# Patient Record
Sex: MALE | Race: OTHER | NOT HISPANIC OR LATINO | Employment: FULL TIME | URBAN - METROPOLITAN AREA
[De-identification: names, ages, dates, MRNs, and addresses within clinical notes are randomized per-mention and may not be internally consistent; named-entity substitution may affect disease eponyms.]

---

## 2017-01-06 ENCOUNTER — OFFICE VISIT (OUTPATIENT)
Dept: PHYSICAL MEDICINE AND REHAB | Facility: CLINIC | Age: 57
End: 2017-01-06
Payer: COMMERCIAL

## 2017-01-06 VITALS
HEART RATE: 59 BPM | DIASTOLIC BLOOD PRESSURE: 77 MMHG | BODY MASS INDEX: 23.19 KG/M2 | HEIGHT: 70 IN | WEIGHT: 162 LBS | SYSTOLIC BLOOD PRESSURE: 126 MMHG

## 2017-01-06 DIAGNOSIS — S63.512S: Primary | ICD-10-CM

## 2017-01-06 PROCEDURE — 1159F MED LIST DOCD IN RCRD: CPT | Mod: S$GLB,,, | Performed by: PHYSICAL MEDICINE & REHABILITATION

## 2017-01-06 PROCEDURE — 99999 PR PBB SHADOW E&M-EST. PATIENT-LVL II: CPT | Mod: PBBFAC,,, | Performed by: PHYSICAL MEDICINE & REHABILITATION

## 2017-01-06 PROCEDURE — 99203 OFFICE O/P NEW LOW 30 MIN: CPT | Mod: S$GLB,,, | Performed by: PHYSICAL MEDICINE & REHABILITATION

## 2017-01-06 PROCEDURE — 3078F DIAST BP <80 MM HG: CPT | Mod: S$GLB,,, | Performed by: PHYSICAL MEDICINE & REHABILITATION

## 2017-01-06 PROCEDURE — 3074F SYST BP LT 130 MM HG: CPT | Mod: S$GLB,,, | Performed by: PHYSICAL MEDICINE & REHABILITATION

## 2017-01-06 RX ORDER — ROSUVASTATIN CALCIUM 10 MG/1
TABLET, FILM COATED ORAL
Refills: 11 | COMMUNITY
Start: 2016-11-11 | End: 2018-01-11 | Stop reason: SDUPTHER

## 2017-01-06 RX ORDER — PRASUGREL 10 MG/1
10 TABLET, FILM COATED ORAL DAILY
COMMUNITY
End: 2017-11-03

## 2017-01-06 RX ORDER — FERROUS SULFATE, DRIED 160(50) MG
1 TABLET, EXTENDED RELEASE ORAL 2 TIMES DAILY WITH MEALS
COMMUNITY
End: 2017-11-03

## 2017-01-06 RX ORDER — ASPIRIN 81 MG/1
81 TABLET ORAL DAILY
COMMUNITY
Start: 2020-08-12 | End: 2023-07-21

## 2017-01-06 RX ORDER — MULTIVIT WITH MINERALS/HERBS
1 TABLET ORAL DAILY
COMMUNITY
End: 2019-04-26 | Stop reason: ALTCHOICE

## 2017-01-06 RX ORDER — UBIDECARENONE 30 MG
100 CAPSULE ORAL 2 TIMES DAILY
COMMUNITY
End: 2017-12-04 | Stop reason: CLARIF

## 2017-01-06 NOTE — LETTER
January 6, 2017      Nelson Wallace MD  [address] [facility]  [address]  [facility] [address]           [facility]  [address] [place] [id number]  Phone: [phone]  Fax: [phone]          Patient: Nelson Wallace   MR Number: [id number]   YOB: 1960   Date of Visit: 1/6/2017       Dear Dr. Nelson Wallace:    Thank you for referring Nelson Wallace to me for evaluation. Attached you will find relevant portions of my assessment and plan of care.    If you have questions, please do not hesitate to call me. I look forward to following Nelson Wallace along with you.    Sincerely,    Sharan Kaur MD    Enclosure  CC:  No Recipients    If you would like to receive this communication electronically, please contact [email or web address] or [phone] to request more information on [employer] access.    For providers and/or their staff who would like to refer a patient to [facility], please contact us through our one-stop-shop provider referral line, [facility], at [phone].    If you feel you have received this communication in error or would no longer like to receive these types of communications, please e-mail [email or web address]

## 2017-01-07 NOTE — PROGRESS NOTES
[facility]    Consulting Provider: Nelson Wallace MD    CHIEF COMPLAINT:   Chief Complaint   Patient presents with    Wrist Pain     left wrist pain     HISTORY OF PRESENT ILLNESS: Nelson Wallace is a 56 y.o. right handed male who presents to me for the first time for evaluation and treatment of left wrist pain.  He reports his pain is been present for the last 2-3 months.  There was no specific injury or traumatic event, however he questions if he may have aggravated the area lifting weights.  He also reports that he has prominent ulnar styloids bilaterally and is concerned this may be potential cause of pain.  He notes increased pain with forced wrist flexion and extension.  He feels the symptoms are improving over recent weeks with rest.  He has had a prior injection of corticosteroid to the dorsum of the left wrist last month with some mild relief of his symptoms.  He rates his pain as a 1/10 , but may rise to a 6/10 with certain movements.    Review of Systems   Constitutional: Negative for fever.   HENT: Negative for drooling.    Eyes: Negative for discharge.   Respiratory: Negative for choking.    Cardiovascular: Negative for chest pain.   Genitourinary: Negative for flank pain.   Skin: Negative for wound.   Allergic/Immunologic: Negative for immunocompromised state.   Neurological: Negative for tremors and syncope.   Psychiatric/Behavioral: Negative for behavioral problems.     Past Medical History:   Past Medical History   Diagnosis Date    Allergy     Elevated blood pressure     GERD (gastroesophageal reflux disease)        Past Surgical History: History reviewed. No pertinent past surgical history.    Family History:   Family History   Problem Relation Age of Onset    Osteoporosis Mother     Hypertension Mother     Hypertension Father     Hypertension Brother        Medications:   Current Outpatient Prescriptions on File Prior to Visit   Medication Sig Dispense Refill     "losartan (COZAAR) 25 MG tablet TAKE ONE TABLET BY MOUTH EVERY DAY 30 tablet 11    levothyroxine (SYNTHROID) 75 MCG tablet Take 1 tablet (75 mcg total) by mouth before breakfast. 30 tablet 11    loratadine (CLARITIN) 10 mg tablet Take 1 tablet (10 mg total) by mouth daily as needed for Allergies.  0    omeprazole (PRILOSEC) 20 MG capsule Take 1 capsule (20 mg total) by mouth daily as needed. 30 capsule 11     No current facility-administered medications on file prior to visit.        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    PHYSICAL EXAMINATION:   General    Vitals:    01/06/17 1415   BP: 126/77   Pulse: (!) 59   Weight: 73.5 kg (162 lb)   Height: 5' 10" (1.778 m)     Constitutional: Oriented to person, place, and time. No apparent distress. Appears well-developed and well-nourished. Pleasant.  HENT:   Head: Normocephalic and atraumatic.   Eyes: Right eye exhibits no discharge. Left eye exhibits no discharge. No scleral icterus.   Pulmonary/Chest: Effort normal. No respiratory distress.   Abdominal: There is no guarding.   Neurological: Alert and oriented to person, place, and time.   Psychiatric: Behavior is normal.   Right Hand Exam     Tenderness   The patient is experiencing no tenderness.         Range of Motion     Wrist   Extension: normal   Flexion: normal   Pronation: normal   Supination: normal     Muscle Strength   Wrist Extension: 5/5   Wrist Flexion: " 5/5   : 5/5     Other   Erythema: absent  Scars: absent  Sensation: normal  Pulse: present      Left Hand Exam     Tenderness   Left hand tenderness location: Mild to moderate tender to palpation over the dorsum of the radial carpal bones.     Range of Motion     Wrist   Extension: 45   Flexion: 80   Pronation: normal   Supination: normal     Muscle Strength   Wrist Extension: 5/5   Wrist Flexion: 5/5   :  5/5     Other   Erythema: absent  Scars: absent  Sensation: normal  Pulse: present        INSPECTION: There is no swelling, ecchymoses, erythema of the left wrist or hand.    Imaging  MRI of the left wrist from 12/8/2016: Patchy bone marrow edema in the carpus, with joint effusions and/or synovial thickening.     Diagnostic Ultrasound Exam: Brief limited diagnostic ultrasound of the dorsum of the left wrist was performed today.  The finger extensor tendons were normal in appearance.  There was no hypoechoic fluid collection seen around any tendon or joints.  There were no ganglion cysts observed.  Color Doppler function revealed no significant hyperemia of the dorsum of the carpal bones or radiocarpal joint.  Point of maximal tenderness was over the radial dorsal carpal bones.    Data Reviewed: MRI, ultrasound    Supportive Actions: Independent visualization of images or test specimens    ASSESSMENT:   1. Sprain of carpal joint of left wrist, sequela      PLAN:     1. Time was spent reviewing the above diagnosis in depth with Dr. Wallace today, including acute management and rehabilitation.     2.  We discussed that his pain is likely secondary to an overuse type phenomenon.  Brief diagnostic ultrasound exam failed to reveal any significant abnormalities over the dorsum of the wrist.  He appears to be most tender over the dorsum of the radial carpal bones.  MRI shows some generalized bone marrow edema, without obvious ligamentous injury or other significant pathology.  It is encouraging that his pain is  "reducing with rest.  I encouraged him to avoid lifting or repetitive exercises with the left wrist for the next 6 weeks.  He may continue to exercise his lower extremities.  We also discussed the use of the splint which is optional, but may help remind him to avoid activities with the left wrist.  He may obtain such a splint at his own leisure.    3.  There is evidence lacking to suggest rheumatologic etiologies, however we discussed the fact that it is reasonable to obtain basic rheumatologic labs if he desires.  He reports that he may ask a colleague to order these tests at [facility].     4. RTC in 6 weeks if his pain is not improved or worsened.    This is a consult from Dr. Nelson Wallace. Please see the "Communications" section of Epic to see how the consulting physician received the report of today's findings and recommendations. If it's an [facility] physician, it will be forwarded to his/her "in basket".    The above note was completed, in part, with the aid of Dragon dictation software/hardware. Translation errors may be present.  "

## 2017-04-06 ENCOUNTER — HISTORICAL (OUTPATIENT)
Dept: ADMINISTRATIVE | Facility: HOSPITAL | Age: 57
End: 2017-04-06

## 2017-04-06 LAB
ALBUMIN SERPL-MCNC: 4.2 G/DL (ref 3.1–4.7)
ALP SERPL-CCNC: 51 IU/L (ref 40–104)
ALT (SGPT): 24 IU/L (ref 3–33)
AST SERPL-CCNC: 20 IU/L (ref 10–40)
BASOPHILS NFR BLD: 0.1 K/UL (ref 0–0.2)
BASOPHILS NFR BLD: 0.7 %
BILIRUB SERPL-MCNC: 0.9 MG/DL (ref 0.3–1)
BUN SERPL-MCNC: 13 MG/DL (ref 8–20)
CALCIUM SERPL-MCNC: 8.7 MG/DL (ref 7.7–10.4)
CHLORIDE: 106 MMOL/L (ref 98–110)
CO2 SERPL-SCNC: 26.5 MMOL/L (ref 22.8–31.6)
COMPLEXED PSA SERPL-MCNC: 3.3 NG/ML (ref 0–3)
CREATININE: 0.91 MG/DL (ref 0.6–1.4)
EOSINOPHIL NFR BLD: 0.7 K/UL (ref 0–0.7)
EOSINOPHIL NFR BLD: 9.1 %
ERYTHROCYTE [DISTWIDTH] IN BLOOD BY AUTOMATED COUNT: 12.2 % (ref 11.7–14.9)
GLUCOSE: 107 MG/DL (ref 70–99)
GRAN #: 3.8 K/UL (ref 1.4–6.5)
GRAN%: 48.8 %
HCT VFR BLD AUTO: 39.7 % (ref 39–55)
HGB BLD-MCNC: 13.3 G/DL (ref 14–16)
IMMATURE GRANS (ABS): 0 K/UL (ref 0–1)
IMMATURE GRANULOCYTES: 0.1 %
LYMPH #: 2.7 K/UL (ref 1.2–3.4)
LYMPH%: 35.7 %
MCH RBC QN AUTO: 29 PG (ref 25–35)
MCHC RBC AUTO-ENTMCNC: 33.5 G/DL (ref 31–36)
MCV RBC AUTO: 86.5 FL (ref 80–100)
MONO #: 0.4 K/UL (ref 0.1–0.6)
MONO%: 5.6 %
NUCLEATED RBCS: 0 %
PLATELET # BLD AUTO: 162 K/UL (ref 140–440)
PMV BLD AUTO: 11 FL (ref 8.8–12.7)
POTASSIUM SERPL-SCNC: 4.4 MMOL/L (ref 3.5–5)
PROT SERPL-MCNC: 7 G/DL (ref 6–8.2)
RBC # BLD AUTO: 4.59 M/UL (ref 4.3–5.9)
SODIUM: 140 MMOL/L (ref 134–144)
T4 FREE SP9 P CHAL SERPL-SCNC: 0.89 NG/DL (ref 0.45–1.27)
TSH SERPL DL<=0.005 MIU/L-ACNC: 11.15 ULU/ML (ref 0.3–5.6)
WBC # BLD AUTO: 7.7 K/UL (ref 5–10)

## 2017-06-19 DIAGNOSIS — I10 ESSENTIAL HYPERTENSION: Primary | ICD-10-CM

## 2017-06-19 RX ORDER — LOSARTAN POTASSIUM 25 MG/1
25 TABLET ORAL DAILY
Qty: 90 TABLET | Refills: 1 | Status: SHIPPED | OUTPATIENT
Start: 2017-06-19 | End: 2017-09-06

## 2017-09-06 RX ORDER — LOSARTAN POTASSIUM 50 MG/1
50 TABLET ORAL DAILY
Qty: 90 TABLET | Refills: 1 | Status: SHIPPED | OUTPATIENT
Start: 2017-09-06 | End: 2017-09-21 | Stop reason: SDUPTHER

## 2017-09-06 RX ORDER — LOSARTAN POTASSIUM 50 MG/1
TABLET ORAL
COMMUNITY
End: 2017-09-06 | Stop reason: SDUPTHER

## 2017-09-21 RX ORDER — LOSARTAN POTASSIUM 50 MG/1
50 TABLET ORAL DAILY
Qty: 90 TABLET | Refills: 1 | Status: SHIPPED | OUTPATIENT
Start: 2017-09-21 | End: 2019-04-26 | Stop reason: SDUPTHER

## 2017-11-03 ENCOUNTER — OFFICE VISIT (OUTPATIENT)
Dept: UROLOGY | Facility: CLINIC | Age: 57
End: 2017-11-03
Payer: COMMERCIAL

## 2017-11-03 VITALS
HEIGHT: 70 IN | RESPIRATION RATE: 18 BRPM | WEIGHT: 165.44 LBS | HEART RATE: 53 BPM | SYSTOLIC BLOOD PRESSURE: 127 MMHG | DIASTOLIC BLOOD PRESSURE: 70 MMHG | BODY MASS INDEX: 23.68 KG/M2

## 2017-11-03 DIAGNOSIS — R39.15 URINARY URGENCY: ICD-10-CM

## 2017-11-03 DIAGNOSIS — R97.20 ELEVATED PSA: ICD-10-CM

## 2017-11-03 DIAGNOSIS — N40.1 BPH WITH URINARY OBSTRUCTION: Primary | ICD-10-CM

## 2017-11-03 DIAGNOSIS — N13.8 BPH WITH URINARY OBSTRUCTION: Primary | ICD-10-CM

## 2017-11-03 PROCEDURE — 99205 OFFICE O/P NEW HI 60 MIN: CPT | Mod: S$GLB,,, | Performed by: UROLOGY

## 2017-11-03 PROCEDURE — 99999 PR PBB SHADOW E&M-EST. PATIENT-LVL III: CPT | Mod: PBBFAC,,, | Performed by: UROLOGY

## 2017-11-03 RX ORDER — ACETAMINOPHEN 500 MG
2000 TABLET ORAL DAILY
COMMUNITY
End: 2021-12-16

## 2017-11-03 RX ORDER — LEVOTHYROXINE SODIUM 75 UG/1
75 TABLET ORAL
Qty: 30 TABLET | Refills: 11 | Status: SHIPPED | OUTPATIENT
Start: 2017-11-03 | End: 2017-11-03

## 2017-11-03 RX ORDER — TAMSULOSIN HYDROCHLORIDE 0.4 MG/1
0.4 CAPSULE ORAL DAILY
Qty: 90 CAPSULE | Refills: 3 | Status: SHIPPED | OUTPATIENT
Start: 2017-11-03 | End: 2018-11-03

## 2017-11-03 RX ORDER — ACETAMINOPHEN 160 MG/5ML
SUSPENSION, ORAL (FINAL DOSE FORM) ORAL
COMMUNITY
End: 2019-04-26 | Stop reason: ALTCHOICE

## 2017-11-03 RX ORDER — TAMSULOSIN HYDROCHLORIDE 0.4 MG/1
0.4 CAPSULE ORAL DAILY
Qty: 30 CAPSULE | Refills: 11 | Status: SHIPPED | OUTPATIENT
Start: 2017-11-03 | End: 2017-11-03 | Stop reason: SDUPTHER

## 2017-11-03 RX ORDER — LEVOTHYROXINE SODIUM 88 UG/1
TABLET ORAL
COMMUNITY
Start: 2017-04-07 | End: 2017-11-08 | Stop reason: SDUPTHER

## 2017-11-03 NOTE — PROGRESS NOTES
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    He feels like he empties his bladder, though does have to press on his perineum to get last few drops of urine out so he does not have post-void dribble/leak.  He does note terminal intermittency, which is worse in the morning. Nocturia x2. + AM hesitancy and intermittency of stream  Does also have some initial morning frequency, as has first am void 530-6, then 630 after morning shower (often gets urge in shower), and has about 3 AM voids.  He urinates 3x during his clinic day, 2 times at home, and 2 more before bed.    He does also have urinary urgency and can pinpoint 2 specific incidents lately of severe urgency.   Once when in security line at airport he chugged his bottle of water and orangina before going through and had urgency by the time he cleared security with Q15min moderate volume void frequency. Also had an office lunch in [facility] he drank a lot of coke and iced tea and then had Q15 minute frequency  On a typical day he has urgency though notes it is mild. No UUI  He drinks coffee in the morning and does often have tea during the day. ++ spicy foods  Average BM [id number]    Does also have mild painful ejaculation if his bladder is full.  Notes with the need to urinate after intercourse, he has increased urinary hesitancy and intermittency at this time.  Denies perineal, perirectal, testicular pain.  Occasional mild burning/stinging with urination that then calms down    psa 3.3 4/6/17, Cr 0.91  psa 2.8 in 2013  No family history of prostate cancer    Reports trace blood in his UA twice. On review UA had trc blood 4/2017 though UA Micro had 0-2 rbc, and UA 7/2016 negative.  Udip today negative except for 250 blood. He does note that he has some postprandial hyperglycemia, and he had just eaten lunch.  Used to be on effient and asa for cardiac stent 2 yrs  ago. No longer on effient    Bladder scan PVR is 62cc  AUA SS 10/2 (3 weak stream, 2 freq/intermittency)  Has not tried any  meds        Past Medical History:   Diagnosis Date    Allergy     Elevated blood pressure     GERD (gastroesophageal reflux disease)     Hyperlipemia     Hypertension    Hypothyroidism    Past Surgical History:   Procedure Laterality Date    CORONARY ANGIOPLASTY WITH STENT PLACEMENT         Family History   Problem Relation Age of Onset    Osteoporosis Mother     Hypertension Mother     Hypertension Father     Hypertension Brother        Social History     Social History    Marital status:      Spouse name: N/A    Number of children: N/A    Years of education: N/A     Occupational History     [employer]     Social History Main Topics    Smoking status: Never Smoker    Smokeless tobacco: Never Used    Alcohol use Yes      Comment: occ.     Drug use: No    Sexual activity: Yes     Other Topics Concern    Not on file     Social History Narrative    No narrative on file       Review of patient's allergies indicates:   Allergen Reactions    Adhesive tape-silicones      Rash on skin       Medications Reviewed: see MAR    ROS:    Constitutional: denies fevers, chills, night sweats, fatigue, malaise  Respiratory: negative for cough, shortness of breath, wheezing, dyspnea.  Cardiovascular: negative for chest pain, varicose veins, ankle swelling, palpitations, syncope.  GI: negative for abdominal pain, heartburn, indigestion, nausea, vomiting, constipation, diarrhea, blood in stool.   Urology: as noted above in HPI  Endocrinology: negative for cold intolerance, excessive thirst, not feeling tired/sluggish, no heat intolerance.   Hematology/Lymph: negative for easy bleeding, easy bruising, swollen glands.  Musculoskeletal: negative for back pain, joint pain, joint swelling, neck pain.  Allergy-Immunology: negative for seasonal allergies, negative for unusual  "infections.   Skin: negative for boils, breast lumps, hives, itching, rash.   Neurology: negative for, dizziness, headache, tingling/numbness, tremors.   Psych: satisfied with life; negative for, anxiety, depression, suicidal thoughts.     PHYSICAL EXAM:    Vitals:    11/03/17 1338   BP: 127/70   Pulse: (!) 53   Resp: 18     Body mass index is 23.74 kg/m². Weight: 75 kg (165 lb 7.3 oz) Height: 5' 10" (177.8 cm)       General: Alert, cooperative, no distress, appears stated age  Head: Normocephalic, without obvious abnormality, atraumatic  Neck: no masses, no thyromegaly, no lymphadenopathy  Eyes: PERRL, conjunctiva/corneas clear  Lungs: Respirations unlabored, normal effort, no accessory muscle use  CV: Warm and well perfused extremities  Abdomen: Soft, non-tender, no CVA tenderness, no hepatosplenomegaly, no hernia  Penis: phallus normal, uncircumcised, well cared for, no plaques or lesions.   Scrotum: no cysts, no lesions, no rash, no hydrocele.   Epididymes: normal, nontender, symmetrical, no masses or cysts.   Testes: normal, both descended, no masses.   Urethra: palpably normal with orthotopic meatus of normal size    FRAN: normal sphincter tone, no masses, no hemmorrhoids   PROSTATE: 35-40g, no nodules, non-tender, symmetrical.   Extremities: Extremities normal, atraumatic, no cyanosis or edema  Skin: Normal color, texture, and turgor, no rashes or lesions  Psych: Appropriate, well oriented, normal affect, normal mood  Neuro: Non-focal    Lab Results   Component Value Date    PSA 3.3 (H) 04/06/2017         Assessment/Diagnosis:    1. BPH with urinary obstruction  POCT URINE DIPSTICK WITHOUT MICROSCOPE   2. Urinary urgency     3. Elevated PSA  PSA, total and free       Plans:  He does have an enlarged prostate with moderate LUTS. We discussed some conservative measures such as discontinuing fluid intake 2 hours before bed and voiding before bed to control nocturia and limiting bladder irritants to control " frequency. With weak stream, intermittency, and PV dribble as well, discussed starting medical therapy with alpha blocker and Rxed flomax 0.4mg daily after discussion of possible side effects including dizziness and retrograde ejaculation. We did discuss that should alpha blockers not control his symptoms, future options include addition of 5alpha reductase inhibitors to decrease prostate volume, or interventions such as TURP and UroLift (which I provided information about today).    Also discussed conservative measures to control urgency and frequency including avoiding bladder irritants, timed voiding, not postponing voiding, and bowel regimen with OTC miralax, stool softeners, fiber supplements etc to produce soft daily bowel movement as distended bowel has extrinsic compressive effect on bladder. Discussed that bladder irritants include coffe (even decaf), tea, alcohol, soda, spicy foods, acidic juices (orange, tomato), vinegar, and artificial sweeteners. He has noticed increase in symptoms when consuming such food/beverage, and will make effort to decrease intake. We did discuss that sometimes prostatic enlargement/obstruction can contribute to urgency and the combination of medical management for BPH with conservative measures for urgency would likely control his sxs, though can discuss medical therapy for urgency in the future should it be necessary.    For a man in his mid 50's, we did discuss that his psa of 3.3 represents an age-specific (or at least borderline age-specific) PSA elevation. We did review the differential for elevated psa including benign enlargement (which he has based on NEHA), infection/inflammation such as prostatitis (which despite mild occasional ejaculatory discomfort he does not have sxs nor a neha consistsent with any acute prostatitis), and underlying malignancy. He does have a psa on file from 2013 which was 2.8, so newest value does not represent a dramatic rise, though discussed  deserves further evaluation. I have ordered free and total psa, noting that if psa rising, or free psa concerningly low, could discuss any diagnostics further vs resume routine screening.    60 minutes spent in direct face to face encounter, over half in counseling. All questions answered and patient was agreeable to treatment plan.  Flomax, avoid bladder irritants, free/total psa, RTC 3 mos.

## 2017-11-08 DIAGNOSIS — E03.9 HYPOTHYROIDISM, UNSPECIFIED TYPE: Primary | ICD-10-CM

## 2017-11-08 RX ORDER — LEVOTHYROXINE SODIUM 88 UG/1
88 TABLET ORAL
Qty: 90 TABLET | Refills: 1 | Status: SHIPPED | OUTPATIENT
Start: 2017-11-08 | End: 2018-04-06 | Stop reason: SDUPTHER

## 2017-12-04 ENCOUNTER — OFFICE VISIT (OUTPATIENT)
Dept: FAMILY MEDICINE | Facility: CLINIC | Age: 57
End: 2017-12-04
Payer: COMMERCIAL

## 2017-12-04 VITALS
OXYGEN SATURATION: 99 % | HEIGHT: 70 IN | DIASTOLIC BLOOD PRESSURE: 68 MMHG | HEART RATE: 65 BPM | WEIGHT: 165 LBS | BODY MASS INDEX: 23.62 KG/M2 | SYSTOLIC BLOOD PRESSURE: 120 MMHG

## 2017-12-04 DIAGNOSIS — I35.1 AORTIC EJECTION MURMUR: ICD-10-CM

## 2017-12-04 DIAGNOSIS — E78.01 FAMILIAL HYPERCHOLESTEROLEMIA: ICD-10-CM

## 2017-12-04 DIAGNOSIS — R97.20 ELEVATED PSA: ICD-10-CM

## 2017-12-04 DIAGNOSIS — I25.10 ATHEROSCLEROSIS OF NATIVE CORONARY ARTERY OF NATIVE HEART WITHOUT ANGINA PECTORIS: ICD-10-CM

## 2017-12-04 DIAGNOSIS — E03.9 ACQUIRED HYPOTHYROIDISM: ICD-10-CM

## 2017-12-04 DIAGNOSIS — I10 ESSENTIAL HYPERTENSION: ICD-10-CM

## 2017-12-04 DIAGNOSIS — E55.9 VITAMIN D DEFICIENCY: ICD-10-CM

## 2017-12-04 PROBLEM — E78.5 HYPERLIPEMIA: Status: ACTIVE | Noted: 2017-12-04

## 2017-12-04 PROCEDURE — 99213 OFFICE O/P EST LOW 20 MIN: CPT | Mod: ,,, | Performed by: FAMILY MEDICINE

## 2017-12-04 NOTE — PROGRESS NOTES
Subjective:       Patient ID: Nelson Wallace is a 57 y.o. male.    Chief Complaint: Hypertension    Hypertension   This is a chronic problem. The current episode started more than 1 year ago. The problem is unchanged. The problem is controlled. Pertinent negatives include no anxiety, chest pain, headaches, malaise/fatigue, peripheral edema or shortness of breath. There are no associated agents to hypertension. Risk factors for coronary artery disease include male gender and dyslipidemia. Past treatments include angiotensin blockers.     Review of Systems   Constitutional: Negative for activity change, appetite change, fever and malaise/fatigue.   HENT: Negative for congestion and sore throat.    Eyes: Negative for visual disturbance.   Respiratory: Negative for cough, chest tightness and shortness of breath.    Cardiovascular: Negative for chest pain.   Gastrointestinal: Negative for abdominal pain, constipation, diarrhea and nausea.   Genitourinary: Negative for difficulty urinating.   Musculoskeletal: Negative for back pain and myalgias.   Neurological: Negative for headaches.   Hematological: Negative for adenopathy.   Psychiatric/Behavioral: Negative for suicidal ideas.       Past Medical History:   Diagnosis Date    Allergy     Atherosclerosis of native coronary artery of native heart without angina pectoris     Elevated blood pressure     Elevated PSA     GERD (gastroesophageal reflux disease)     Hyperlipemia     Hypertension     Hypothyroidism       Past Surgical History:   Procedure Laterality Date    CORONARY ANGIOPLASTY WITH STENT PLACEMENT         Family History   Problem Relation Age of Onset    Osteoporosis Mother     Hypertension Mother     Hypertension Father     Hypertension Brother        Social History     Social History    Marital status:      Spouse name: N/A    Number of children: N/A    Years of education: N/A     Occupational History     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     Drug use: No    Sexual activity: Yes     Other Topics Concern    None     Social History Narrative    None       Current Outpatient Prescriptions   Medication Sig Dispense Refill    aspirin (ECOTRIN) 81 MG EC tablet Take 81 mg by mouth once daily.      b complex vitamins tablet Take 1 tablet by mouth once daily.      cholecalciferol, vitamin D3, (VITAMIN D3) 5,000 unit Tab Take 5,000 Units by mouth twice a week.       coenzyme Q10 200 mg capsule Take by mouth.      CRESTOR 10 mg tablet TK 1 T PO QHS  11    levothyroxine (LEVOXYL) 88 MCG tablet Take 1 tablet (88 mcg total) by mouth before breakfast. 90 tablet 1    losartan (COZAAR) 50 MG tablet Take 1 tablet (50 mg total) by mouth once daily. 90 tablet 1    tamsulosin (FLOMAX) 0.4 mg Cp24 Take 1 capsule (0.4 mg total) by mouth once daily. 90 capsule 3     No current facility-administered medications for this visit.        Review of patient's allergies indicates:   Allergen Reactions    Adhesive tape-silicones      Rash on skin     Objective:      Blood pressure 120/68, pulse 65, height 5' 10" (1.778 m), weight 74.8 kg (165 lb), SpO2 99 %. Body mass index is 23.68 kg/m².   Physical Exam   Constitutional: He is oriented to person, place, and time. He appears well-developed and well-nourished.   HENT:   Head: Atraumatic.   Eyes: EOM are normal. Pupils are equal, round, and reactive to light. Right pupil is round. Left pupil is round.   Neck: Normal range of motion. Neck supple. No thyromegaly present.   Cardiovascular: Normal rate and regular rhythm.    Murmur (2/6 DELONTE right, NR) heard.  Pulmonary/Chest: Effort normal and breath sounds normal. No respiratory distress.   Abdominal: There is no hepatosplenomegaly. There is no tenderness.   Musculoskeletal: Normal range of motion. He exhibits no edema.   Lymphadenopathy: "     He has no cervical adenopathy.        Right: No supraclavicular adenopathy present.        Left: No supraclavicular adenopathy present.   Neurological: He is alert and oriented to person, place, and time. He has normal strength. No cranial nerve deficit or sensory deficit.   Skin: Skin is warm and dry.   Psychiatric: He has a normal mood and affect. His behavior is normal. Judgment and thought content normal. He expresses no suicidal plans.           Assessment:       1. Essential hypertension    2. Atherosclerosis of native coronary artery of native heart without angina pectoris    3. Acquired hypothyroidism    4. Elevated PSA    5. Familial hypercholesterolemia    6. Aortic ejection murmur    7. Vitamin D deficiency        Plan:       Nelson was seen today for hypertension.    Diagnoses and all orders for this visit:    Essential hypertension  -     Comprehensive metabolic panel; Future  -     Lipid panel; Future  -     Comprehensive metabolic panel  -     Lipid panel    Atherosclerosis of native coronary artery of native heart without angina pectoris    Acquired hypothyroidism  -     TSH; Future  -     TSH    Elevated PSA  -     PSA, Screening; Future  -     PSA, Screening    Familial hypercholesterolemia    Aortic ejection murmur  -     2D Echo w/ Color Flow Doppler; Future    Vitamin D deficiency  -     Calcitriol (1,25 di-OH Vitamin D); Future  -     Calcitriol (1,25 di-OH Vitamin D)

## 2017-12-04 NOTE — PATIENT INSTRUCTIONS
Understanding Coronary Artery Disease (CAD)    To understand coronary artery disease (CAD), you need to know how your heart works. Your heart is a muscle that pumps blood throughout your body. To work right, your heart needs a steady supply of oxygen. It gets this oxygen from blood supplied by the coronary arteries.     Healthy artery   Healthy artery. When a coronary artery is healthy and has no blockages, blood flows through easily. Healthy arteries can easily supply the oxygen-rich blood your heart needs.     Damaged artery   Damaged artery. Coronary artery disease begins when damage to the artery lining leads to the buildup of fat-like substances and cholesterol along the artery wall. This is called plaque. This damage could be caused by things like high blood pressure or smoking. This plaque buildup begins to narrow the arteries carrying blood to the heart. This is called atherosclerosis,     Narrowed artery   Narrowed artery. As more plaque builds up, your artery has trouble supplying blood to your heart muscle when it needs it most, such as during exercise. You may not feel any symptoms when this happens. Or you may feel angina--pressure, tightness, achiness, or pain in your chest, jaw, neck, back, or arm.     Blocked artery   Blocked artery. A piece of plaque may break off and completely block the artery. Or a blood clot may plug the narrowed artery. When this happens, blood flow is blocked from reaching the heart. Without oxygen-rich blood, part of the heart muscle becomes damaged and stops working. You may feel crushing pressure or pain in or around your chest. This is a heart attack (acute myocardial infarction, or AMI) and is a medical emergency.     Date Last Reviewed: 3/28/2016  © 9708-9617 [employer]. [address] [place] [address], [place] [address]. All rights reserved. This information is not intended as a substitute for professional medical care. Always follow your healthcare  professional's instructions.

## 2017-12-09 LAB
ALBUMIN SERPL-MCNC: 4.6 G/DL (ref 3.1–4.7)
ALP SERPL-CCNC: 50 IU/L (ref 40–104)
ALT (SGPT): 29 IU/L (ref 3–33)
AST SERPL-CCNC: 24 IU/L (ref 10–40)
BILIRUB SERPL-MCNC: 1.1 MG/DL (ref 0.3–1)
BUN SERPL-MCNC: 20 MG/DL (ref 8–20)
CALCIUM SERPL-MCNC: 9.2 MG/DL (ref 7.7–10.4)
CHLORIDE: 106 MMOL/L (ref 98–110)
CO2 SERPL-SCNC: 28.5 MMOL/L (ref 22.8–31.6)
CREATININE: 0.86 MG/DL (ref 0.6–1.4)
GLUCOSE: 122 MG/DL (ref 70–99)
POTASSIUM SERPL-SCNC: 4.2 MMOL/L (ref 3.5–5)
PROT SERPL-MCNC: 7.1 G/DL (ref 6–8.2)
SODIUM: 140 MMOL/L (ref 134–144)
TSH SERPL DL<=0.005 MIU/L-ACNC: 1.77 ULU/ML (ref 0.3–5.6)

## 2018-01-11 DIAGNOSIS — E78.5 HYPERLIPIDEMIA, UNSPECIFIED HYPERLIPIDEMIA TYPE: Primary | ICD-10-CM

## 2018-01-12 RX ORDER — ROSUVASTATIN CALCIUM 10 MG/1
TABLET, FILM COATED ORAL
Qty: 90 TABLET | Refills: 3 | Status: SHIPPED | OUTPATIENT
Start: 2018-01-12 | End: 2018-11-10 | Stop reason: SDUPTHER

## 2018-02-02 ENCOUNTER — OFFICE VISIT (OUTPATIENT)
Dept: UROLOGY | Facility: CLINIC | Age: 58
End: 2018-02-02
Payer: COMMERCIAL

## 2018-02-02 VITALS
HEART RATE: 49 BPM | HEIGHT: 70 IN | SYSTOLIC BLOOD PRESSURE: 124 MMHG | WEIGHT: 164.88 LBS | BODY MASS INDEX: 23.6 KG/M2 | RESPIRATION RATE: 18 BRPM | DIASTOLIC BLOOD PRESSURE: 72 MMHG

## 2018-02-02 DIAGNOSIS — N40.1 BPH WITH URINARY OBSTRUCTION: Primary | ICD-10-CM

## 2018-02-02 DIAGNOSIS — N13.8 BPH WITH URINARY OBSTRUCTION: Primary | ICD-10-CM

## 2018-02-02 LAB
BILIRUB SERPL-MCNC: ABNORMAL MG/DL
BLOOD URINE, POC: ABNORMAL
COLOR, POC UA: CLEAR
GLUCOSE UR QL STRIP: 250
KETONES UR QL STRIP: ABNORMAL
LEUKOCYTE ESTERASE URINE, POC: ABNORMAL
NITRITE, POC UA: ABNORMAL
PH, POC UA: 5
POC RESIDUAL URINE VOLUME: 97 ML (ref 0–100)
PROTEIN, POC: ABNORMAL
SPECIFIC GRAVITY, POC UA: 1.02
UROBILINOGEN, POC UA: ABNORMAL

## 2018-02-02 PROCEDURE — 99999 PR PBB SHADOW E&M-EST. PATIENT-LVL III: CPT | Mod: PBBFAC,,, | Performed by: UROLOGY

## 2018-02-02 PROCEDURE — 99214 OFFICE O/P EST MOD 30 MIN: CPT | Mod: S$GLB,,, | Performed by: UROLOGY

## 2018-02-02 PROCEDURE — 81002 URINALYSIS NONAUTO W/O SCOPE: CPT | Mod: S$GLB,,, | Performed by: UROLOGY

## 2018-02-02 PROCEDURE — 3008F BODY MASS INDEX DOCD: CPT | Mod: S$GLB,,, | Performed by: UROLOGY

## 2018-02-02 PROCEDURE — 51798 US URINE CAPACITY MEASURE: CPT | Mod: S$GLB,,, | Performed by: UROLOGY

## 2018-02-02 NOTE — PROGRESS NOTES
[facility] Urology Progress Note    Nelson Wallace is a 57 y.o. male who presents for follow up of BPH/LUTS.     I saw him initially on 11/3/17.  He reported terminal intermittency, worse in the morning, and need to press on his perineum to get the last few drops of urine out so he does not have postvoid dribbling/leaking  He also noted early morning hesitancy and intermittency of stream, and nocturia ×2, as well as early morning frequency with about 3 morning voids before starting his work day.  Total DTF approximately 10x   He did also note urinary urgency and was able to pinpoint specific incidences of severe urgency, such as when traveling, or with a lunch meeting lately of severe urgency.  On a typical day he has urgency though notes it is mild. No UUI  He drinks coffee in the morning and does often have tea during the day. ++ spicy foods.  He notices worsening urgency and frequency with Tea and caffeine. Average BM [id number]  Also noted mild painful ejaculation if his bladder is full. As well, with the need to urinate after intercourse, has increased urinary hesitancy and intermittency at this time. Denies perineal, perirectal, testicular pain.  Occasional mild burning/stinging with urination that then calms down. Had never been on  meds  AUA SS: 10/2 (3: weak stream; 2: frequency, intermittency). PVR 62cc. FRAN 35-40g benign  Concerned for past MH but on review UA had trc blood 4/2017 though UA Micro had 0-2 rbc, and UA 7/2016 negative. ASA (but off effient) for cardiac stenting 2 years ago  psa 3.3 4/6/17, Cr 0.91 - psa 2.8 in 2013 - No family history of prostate cancer  Started flomax and discussed possible future urolift v turp, discussed conservative measures for OAB, and advised recheck of psa free/total given age specific elevation.    He returns today noting that his urgency has significantly improved.  He does still note that it is worse with tea drinking, though he has stopped all other bladder irritants  "beverages.  He has been compliant with Flomax and in general on a good day he feels that he is improved 70%, and on a bad day has improved 50% from last visit  He has been taking his Flomax in the morning as he finds helps him more throughout the day  He has been trying to void on a schedule and making a better effort to urinate during his clinic day  Occasional constipation, notes that he has a good bowel movement he will urinate better.  No ejaculatory complaints, no pain with ejaculation, not experiencing retrograde ejaculation  Nocturia is down to one time per night, and the flow is good at night whereas it used to be poor  AUA SS: 7/2 - medications helped 7 out of 10 - 2: Weak stream; 1: Emptying, frequency, intermittency, urgency, nocturia  PVR by bladder scan: 97cc  Udip: sg 1.025, pH 5, 250 glucose, otherwise negative  12/9/17: PSA 3.2 (24.7% free), Cr 0.86, eGFR 92      ROS: A comprehensive 10 system review was performed and is negative except as noted above in HPI    PHYSICAL EXAM:    Vitals:    02/02/18 1304   BP: 124/72   Pulse: (!) 49   Resp: 18     Body mass index is 23.66 kg/m². Weight: 74.8 kg (164 lb 14.5 oz) Height: 5' 10" (177.8 cm)       General: Alert, cooperative, no distress, appears stated age   Head: Normocephalic, without obvious abnormality, atraumatic   Eyes: PERRL, conjunctiva/corneas clear   Lungs: Respirations unlabored   Heart: Warm and well perfused   Abdomen: soft NT ND  Extremities: Extremities normal, atraumatic, no cyanosis or edema   Skin: Skin color, texture, turgor normal, no rashes or lesions   Psych: Appropriate   Neurologic: Non-focal       Recent Results (from the past 336 hour(s))   POCT URINE DIPSTICK WITHOUT MICROSCOPE    Collection Time: 02/02/18  1:15 PM   Result Value Ref Range    Color, UA clear     Spec Grav UA 1.020     pH, UA 5.0     WBC, UA neg     Nitrite, UA neg     Protein neg     Glucose,      Ketones, UA neg     Urobilinogen, UA neg     Bilirubin neg  "    Blood, UA neg    POCT Bladder Scan    Collection Time: 02/02/18  1:15 PM   Result Value Ref Range    POC Residual Urine Volume 97 0 - 100 mL       ASSESSMENT   1. BPH with urinary obstruction  POCT URINE DIPSTICK WITHOUT MICROSCOPE    POCT Bladder Scan    PSA, total and free       Plan    With Flomax and lifestyle modifications he has had moderate to significant improvement.  He does still have mild urgency and mild lower urinary tract symptoms.  At this time we both mutually feel that medical therapy for urgency or OAB is not indicated, and discussed fine-tuning conservative measures to decrease urgency and frequency such as avoiding bladder irritants, for him now namely tea, improved efforts at time voiding, not postponing urination, and a daily bowel regimen given the extrinsic compressive effect of distended bowel on the bladder.  Discussed OTC agents to help produce soft daily bowel movement.  We also discussed conservative measures to decrease his nocturia is down to one time per night now, though he shouldn't discontinue fluid intake 2 hours before bed and urinate just before bed.    With his 50-70% symptomatic improvement, he has not interested in any further evaluation at this time, though we did discuss that his postvoid residual remains elevated, and is more so today than at last visit at 97 cc.  We did review the dangers of incomplete bladder emptying secondary to prostatic obstruction such as urinary retention, bladder stones, UTI, and worsening LUTS and urgency.  We did briefly discuss dual medical therapy, adding a 5 alpha reductase inhibitor to his alpha blocker such as finasteride or dutasteride for gland shrinkage.  We did discuss that this is only beneficial and prostate greater than 40 g, which he may or may not have.  As well, for him, we discussed his borderline age elevated PSA and would defer starting finasteride which would affect his PSA until further evaluation.  His PSA of 3.2 remains  an age-specific borderline elevation for a 57-year-old man, though is stable from previous values and may represent acceptable PSA density with an estimated prostate volume of 35 cc.  His free PSA is not concerningly low, though his borderline.  We did discuss that a free PSA greater than 30% carries with it an approximate 8% risk of underlying malignancy, or is a free PSA less than 10% would be a 50% risk.  His free PSA percent of 24.7% is not overly concerning at this time, though given his PSA related to age, advised keeping a closer eye on this and checking it again in 6 months with a free and total PSA rather than annually.  Would defer any finasteride until repeat assessment.    We did discuss formal evaluation of his lower urinary tract with flexible cystourethroscope predetermined level of prostatic obstruction, with or without concurrent transrectal ultrasound to measure accurate prostate volume to guide further recommendations.  He has not interested at this time, though will consider.  We did discuss that these office-based diagnostic procedures are not only to evaluate for surgical or procedural intervention, but can also help guide medical therapy as well, as if for example he has a large median lobe gland shrinkage with oral medications is unlikely to help, and likewise if his prostate is smaller than 40 g.  Did discuss that this evaluation would also help outline any nonmedical treatment options such as Urolift versus TURP.  Certainly by symptomatology, including persistence of symptoms with alpha-blocker, he is a good candidate for Urolift and we discussed durable five-year results of the LIFT study in patients such as himself in the absence of obstructing median lobe.  He will continue to consider.    At this time given his mild symptom persistence as well as his incomplete emptying, which is of primary concern, we discussed a trial of doubling his Flomax to 0.8 mg daily.  We did discuss the  potential for increase in side effects such as dizziness and orthostatic hypotension, and recommended taking this double dose at bedtime rather than the morning.  He is found better efficacy of his Flomax use in the morning, and therefore advised he test taking a 0.8 mg dose on the day he has not busy in case of side effects.  If well tolerated can continue taking it in the morning.  At this time, she would like to remain on medical therapy, he can return to clinic in 6 months for repeat symptom assessment, and uroflow PVR.  I did however advise in the interim, given his incomplete emptying, that he should come in for a nursing visit for a bladder scan postvoid residual and a proximally 2 months to see if his emptying has improved, or has worsened.  If he continues to demonstrate any significant incomplete emptying, may discuss proceeding with lower tract workup as above.  As well, at any point in the interim, should he like to proceed with further diagnostics, we will schedule.    Summary:  - Continue conservative measures and lifestyle modifications to decrease urgency, frequency, nocturia  - Increase Flomax to 0.8 mg daily  - Nurse visit in 2 months for postvoid residual assessment by bladder scan  - RTC 6 months with free and total PSA prior, or any time sooner if wishes to proceed with lower tract evaluation (cysto/trus)

## 2018-02-02 NOTE — LETTER
February 4, 2018        CHONG Soto MD  [address]  [place] [address]             [place] [facility]  [address] [place] [address] [address]  [place] [address]  Phone: [phone]  Fax: [phone]   Patient: Nelson Wallace   MR Number: [id number]   YOB: 1960   Date of Visit: 2/2/2018       Dear Dr. Soto:    I had the pleasure of seeing your patient, Dr. Nelson Wallace today and urologic follow-up. Attached you will find relevant portions of my assessment and plan of care.    If you have questions, please do not hesitate to call me. I look forward to following Dr. Nelson Wallace along with you.  As always, please do not hesitate to contact me for the urologic needs of your patients    Sincerely,        Gilmer Daly MD            CC  No Recipients    Enclosure

## 2018-04-02 RX ORDER — LOSARTAN POTASSIUM 50 MG/1
TABLET ORAL
Qty: 90 TABLET | Refills: 0 | Status: SHIPPED | OUTPATIENT
Start: 2018-04-02 | End: 2018-07-24

## 2018-04-06 ENCOUNTER — CLINICAL SUPPORT (OUTPATIENT)
Dept: UROLOGY | Facility: CLINIC | Age: 58
End: 2018-04-06
Payer: COMMERCIAL

## 2018-04-06 DIAGNOSIS — E03.9 HYPOTHYROIDISM, UNSPECIFIED TYPE: ICD-10-CM

## 2018-04-06 DIAGNOSIS — N40.1 BPH WITH URINARY OBSTRUCTION: Primary | ICD-10-CM

## 2018-04-06 DIAGNOSIS — N13.8 BPH WITH URINARY OBSTRUCTION: Primary | ICD-10-CM

## 2018-04-06 LAB — POC RESIDUAL URINE VOLUME: 32 ML (ref 0–100)

## 2018-04-06 PROCEDURE — 51798 US URINE CAPACITY MEASURE: CPT | Mod: S$GLB,,, | Performed by: UROLOGY

## 2018-04-06 NOTE — PROGRESS NOTES
Pt arrived at clinic to have bladder scan done for PVR. Pt's PVR = 32. Pt does take flomax once daily. Pt stated that he was having trouble taking 2 was making him dizzy. Pt has f/u appt in August with Dr Daly.

## 2018-04-09 RX ORDER — LEVOTHYROXINE SODIUM 88 UG/1
88 TABLET ORAL
Qty: 90 TABLET | Refills: 0 | Status: SHIPPED | OUTPATIENT
Start: 2018-04-09 | End: 2019-04-26 | Stop reason: SDUPTHER

## 2018-07-24 ENCOUNTER — OFFICE VISIT (OUTPATIENT)
Dept: FAMILY MEDICINE | Facility: CLINIC | Age: 58
End: 2018-07-24
Payer: COMMERCIAL

## 2018-07-24 VITALS
WEIGHT: 166.81 LBS | SYSTOLIC BLOOD PRESSURE: 110 MMHG | OXYGEN SATURATION: 98 % | DIASTOLIC BLOOD PRESSURE: 68 MMHG | BODY MASS INDEX: 23.88 KG/M2 | HEART RATE: 54 BPM | HEIGHT: 70 IN

## 2018-07-24 DIAGNOSIS — Z11.59 NEED FOR HEPATITIS C SCREENING TEST: ICD-10-CM

## 2018-07-24 DIAGNOSIS — Z00.00 ENCOUNTER FOR PREVENTIVE HEALTH EXAMINATION: Primary | ICD-10-CM

## 2018-07-24 DIAGNOSIS — K21.9 GASTROESOPHAGEAL REFLUX DISEASE WITHOUT ESOPHAGITIS: ICD-10-CM

## 2018-07-24 PROCEDURE — 99396 PREV VISIT EST AGE 40-64: CPT | Mod: ,,, | Performed by: FAMILY MEDICINE

## 2018-07-24 NOTE — PATIENT INSTRUCTIONS
Treatment for Aortic Valve Regurgitation  Aortic valve regurgitation is when the aortic valve leaks. The aortic valve is on the left side of the heart and sits between the left lower chamber (ventricle) and the large blood vessel that sends blood to the body (aorta).  Types of treatment  Treatment depends on the cause of your condition and how severe it is.  Mild to moderate aortic valve regurgitation with no symptoms and normal heart function and size may be treated with:  · Regular monitoring. This includes regular checkups and testing.  · Risk factor management. Aggressive management of conditions that can cause aortic valve disease, such as high blood pressure, is recommended. Certain blood pressure medicines such as ACE inhibitors or calcium channel blockers may be prescribed.  Severe aortic valve regurgitation especially if symptoms are present, heart function is reduced, or the heart is enlarged is usually treated with surgery.  · Surgery. Most often the aortic valve will be replaced with either a mechanical or tissue valve depending on your age and other conditions. In rare cases, the valve may be repaired instead of replaced. Part of the aortic root may also be replaced with a graft if needed.  · Medicines. For some people who are not candidates for surgery, medicines such as ACE inhibitors or calcium channel blockers may be used to relieve some of the pressure on the heart. If heart failure is present, medicines such as diuretics are used to prevent fluid retention. For those who get a mechanical aortic valve, a blood thinner called warfarin will be prescribed to prevent the valve from developing blood clots. Those who have any type of valve replacement are advised to take antibiotics before certain procedures to reduce the risk of heart valve infection.  Acute severe aortic valve regurgitation is a medical emergency. Surgery is often done right away.  An infection of the heart valves can cause acute  mild valve regurgitation. You may only need antibiotic medicine for the infection.  Possible complications of aortic valve regurgitation  Possible complications of aortic valve regurgitation can include:  · Heart failure  · Bulging or weakening of the aorta (aortic aneurysm)  · Bacterial infection of the heart valves  · Problems from valve replacement surgery  · Sudden death  To reduce the risk of complications, you may be given medicines such as:  · Blood thinners to prevent blood clots  · Antibiotics before some medical and dental procedures to prevent infections  · Medicines to help the heart pump  Living with aortic valve regurgitation  See your healthcare provider for regular checkups. Call right away if your symptoms change.  Make sure to:  · Watch for symptoms when you exercise. Early symptoms may be noticed during exercise or activity.  · Talk with your healthcare provider about exercise and physical activity.  · Tell all of your healthcare providers including your dentist about your condition.  · Eat a low-salt, heart-healthy diet. This is to lower blood pressure and reduce the stress on your heart.  · Avoid caffeine and alcohol to reduce the risk of arrhythmias.  · Use a cholesterol-lowering medicine if prescribed.  · Stop smoking. Talk with your healthcare provider if you need help to stop.  Preventing aortic valve regurgitation  There are some things that you can do to help prevent aortic valve regurgitation, such as:  · Managing high blood pressure with lifestyle and medicine  · Having a sore throat checked for strep bacteria  · Taking a full course of antibiotic medicine for any strep infection exactly as your healthcare provider tells you to     When should I call the healthcare provider?  If you notice your symptoms gradually getting worse, call your healthcare provider. He or she may recommend a change in medicines or possibly surgery.  Have someone call 911 right away if you have:  · Paler than  normal skin  · Sudden shortness of breath  · A fast or abnormal heartbeat  · Fast breathing  · Severe shortness of breath  · Chest pain  · Severe dizziness  · Loss of consciousness   Date Last Reviewed: 6/1/2016  © 4351-2915 [employer]. [address] [place] [address], [place] [address]. All rights reserved. This information is not intended as a substitute for professional medical care. Always follow your healthcare professional's instructions.

## 2018-07-24 NOTE — PROGRESS NOTES
Subjective:       Patient ID: Nelson Wallace is a 58 y.o. male.    Chief Complaint: Annual Exam    No c/o, sees cardio soon,       Review of Systems   Constitutional: Negative for activity change, appetite change and fever.   HENT: Negative for congestion and sore throat.    Eyes: Negative for visual disturbance.   Respiratory: Negative for cough, chest tightness and shortness of breath.    Cardiovascular: Negative for chest pain.   Gastrointestinal: Negative for abdominal pain, constipation, diarrhea and nausea.   Genitourinary: Negative for difficulty urinating.   Musculoskeletal: Negative for back pain and myalgias.   Neurological: Negative for headaches.   Hematological: Negative for adenopathy.   Psychiatric/Behavioral: Negative for suicidal ideas.       Past Medical History:   Diagnosis Date    Allergy     Atherosclerosis of native coronary artery of native heart without angina pectoris     Elevated blood pressure     Elevated PSA     GERD (gastroesophageal reflux disease)     Hyperlipemia     Hypertension     Hypothyroidism       Past Surgical History:   Procedure Laterality Date    CORONARY ANGIOPLASTY WITH STENT PLACEMENT         Family History   Problem Relation Age of Onset    Osteoporosis Mother     Hypertension Mother     Hypertension Father     Hypertension Brother        Social History     Social History    Marital status:      Spouse name: N/A    Number of children: N/A    Years of education: N/A     Occupational History     [employer]     Social History Main Topics    Smoking status: Never Smoker    Smokeless tobacco: Never Used    Alcohol use No    Drug use: No    Sexual activity: Yes     Other Topics Concern    None     Social History Narrative    None       Current Outpatient Prescriptions   Medication Sig Dispense Refill    aspirin (ECOTRIN) 81 MG EC tablet Take 81 mg by mouth once daily.      b complex vitamins tablet Take 1 tablet by mouth once  "daily.      cholecalciferol, vitamin D3, (VITAMIN D3) 5,000 unit Tab Take 1,000 Units by mouth once daily.       coenzyme Q10 200 mg capsule Take by mouth.      CRESTOR 10 mg tablet TK 1 T PO QHS 90 tablet 3    levothyroxine (SYNTHROID) 88 MCG tablet TAKE 1 TABLET (88 MCG TOTAL) BY MOUTH BEFORE BREAKFAST. 90 tablet 0    losartan (COZAAR) 50 MG tablet Take 1 tablet (50 mg total) by mouth once daily. 90 tablet 1    tamsulosin (FLOMAX) 0.4 mg Cp24 Take 1 capsule (0.4 mg total) by mouth once daily. 90 capsule 3     No current facility-administered medications for this visit.        Review of patient's allergies indicates:  No Known Allergies  Objective:      Blood pressure 110/68, pulse (!) 54, height 5' 10" (1.778 m), weight 75.7 kg (166 lb 12.8 oz), SpO2 98 %. Body mass index is 23.93 kg/m².   Physical Exam   Constitutional: He is oriented to person, place, and time. He appears well-developed and well-nourished.   HENT:   Head: Atraumatic.   Eyes: EOM are normal. Pupils are equal, round, and reactive to light. Right pupil is round. Left pupil is round.   Neck: Normal range of motion. Neck supple. No thyromegaly present.   Cardiovascular: Normal rate and regular rhythm.    No murmur heard.  Pulmonary/Chest: Effort normal and breath sounds normal. No respiratory distress.   Abdominal: Soft. Bowel sounds are normal. There is no hepatosplenomegaly. There is no tenderness.   Musculoskeletal: Normal range of motion. He exhibits no edema.   Lymphadenopathy:     He has no cervical adenopathy.        Right: No supraclavicular adenopathy present.        Left: No supraclavicular adenopathy present.   Neurological: He is alert and oriented to person, place, and time. He has normal strength. No cranial nerve deficit or sensory deficit.   Skin: Skin is warm and dry.   Psychiatric: He has a normal mood and affect. His behavior is normal. Judgment and thought content normal. He expresses no suicidal plans.           Assessment: "       1. Encounter for preventive health examination    2. Need for hepatitis C screening test        Plan:       Nelson was seen today for annual exam.    Diagnoses and all orders for this visit:    Encounter for preventive health examination  -     Comprehensive metabolic panel; Future  -     Lipid panel; Future  -     CBC auto differential; Future  -     TSH; Future  -     Comprehensive metabolic panel  -     Lipid panel  -     CBC auto differential  -     TSH    Need for hepatitis C screening test  -     Hepatitis C antibody; Future  -     Hepatitis C antibody

## 2018-08-03 ENCOUNTER — OFFICE VISIT (OUTPATIENT)
Dept: UROLOGY | Facility: CLINIC | Age: 58
End: 2018-08-03
Payer: COMMERCIAL

## 2018-08-03 VITALS
SYSTOLIC BLOOD PRESSURE: 106 MMHG | HEART RATE: 63 BPM | WEIGHT: 166.88 LBS | BODY MASS INDEX: 23.89 KG/M2 | DIASTOLIC BLOOD PRESSURE: 61 MMHG | HEIGHT: 70 IN | RESPIRATION RATE: 18 BRPM

## 2018-08-03 DIAGNOSIS — R97.20 ELEVATED PSA: ICD-10-CM

## 2018-08-03 DIAGNOSIS — N13.8 BPH WITH URINARY OBSTRUCTION: Primary | ICD-10-CM

## 2018-08-03 DIAGNOSIS — N40.1 BPH WITH URINARY OBSTRUCTION: Primary | ICD-10-CM

## 2018-08-03 LAB
BILIRUB SERPL-MCNC: ABNORMAL MG/DL
BLOOD URINE, POC: ABNORMAL
COLOR, POC UA: CLEAR
GLUCOSE UR QL STRIP: 250
KETONES UR QL STRIP: ABNORMAL
LEUKOCYTE ESTERASE URINE, POC: ABNORMAL
NITRITE, POC UA: ABNORMAL
PH, POC UA: 5
POC RESIDUAL URINE VOLUME: 34 ML (ref 0–100)
PROTEIN, POC: ABNORMAL
SPECIFIC GRAVITY, POC UA: 1.02
UROBILINOGEN, POC UA: ABNORMAL

## 2018-08-03 PROCEDURE — 51798 US URINE CAPACITY MEASURE: CPT | Mod: S$GLB,,, | Performed by: UROLOGY

## 2018-08-03 PROCEDURE — 99999 PR PBB SHADOW E&M-EST. PATIENT-LVL III: CPT | Mod: PBBFAC,,, | Performed by: UROLOGY

## 2018-08-03 PROCEDURE — 81002 URINALYSIS NONAUTO W/O SCOPE: CPT | Mod: S$GLB,,, | Performed by: UROLOGY

## 2018-08-03 PROCEDURE — 99213 OFFICE O/P EST LOW 20 MIN: CPT | Mod: 25,S$GLB,, | Performed by: UROLOGY

## 2018-08-03 NOTE — PROGRESS NOTES
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     11/3/17:  Initial eval  C/o terminal intermittency, worse in the morning, and need to press on his perineum to get the last few drops of urine out so he does not have postvoid dribbling/leaking  He also noted early morning hesitancy and intermittency of stream, and nocturia ×2, as well as early morning frequency with about 3 morning voids before starting his work day.  DTF 10x  He did also note urinary urgency and was able to pinpoint specific incidences of severe urgency, such as when traveling, or with a lunch meeting lately of severe urgency.  On a typical day he has urgency though notes it is mild. No UUI. He drinks coffee in the morning and does often have tea during the day. ++ spicy foods.  He notices worsening urgency and frequency with Tea and caffeine. Average BM [id number]. Also noted mild painful ejaculation if his bladder is full. As well, with the need to urinate after intercourse, has increased urinary hesitancy and intermittency at this time. Denies perineal, perirectal, testicular pain. Occasional mild burning/stinging with urination that then calms down. Had never been on  meds  AUA SS: 10/2 (3: weak stream; 2: frequency, intermittency). PVR 62cc. FRAN 35-40g benign  Concerned for past MH but on review UA had trc blood 4/2017 though UA Micro had 0-2 rbc, and UA 7/2016 negative. ASA (but off effient) for cardiac stenting 2 years ago  psa 3.3 4/6/17, Cr 0.91 - psa 2.8 in 2013 - No family history of prostate cancer  Started flomax and discussed possible future urolift v turp, discussed conservative measures for OAB, and advised recheck of psa free/total given age specific elevation.     2/2/18:  urgency has significantly improved with eliminating most bladder irritants, and notes is worse with tea. Made effort at timed voiding  On Flomax feels that he is improved  "70% on a good day, and on a bad day has improved 50% from last visit. Take in AM as helps through day, though NTF down to 1x and flow improved at night.  Occasional constipation, notes that he has a good bowel movement he will urinate better.  AUA SS: 7/2 - medications helped 7 out of 10 - 2: Weak stream; 1: Emptying, frequency, intermittency, urgency, nocturia  PVR by bladder scan: 97cc. Udip: sg 1.025, pH 5, 250 glucose, otherwise negative  12/9/17: PSA 3.2 (24.7% free), Cr 0.86, eGFR 92  Advised doubling flomax, though returned to 0.4mg dose 2/2 dizziness and reeval of PVR on 4/6/18 was 32cc    Returns today noting:  Ok with one flomax.  Still has exacerbation of LUTS with tea and orange drink   Losartan at night, and has nocturia 2x/night  Since switching losartan back to am gets up 1x/night - max 2  Voids before bed  No fluids before bed  Not much double voiding  Some urine trapping/pv dribble.  Denies hematuria/dysuria  Did not repeat psa.  Udip with trc protein, 250 glucose  34cc pvr by bladder scan      ROS: A comprehensive 10 system review was performed and is negative except as noted above in HPI    PHYSICAL EXAM:    Vitals:    08/03/18 1352   BP: 106/61   Pulse: 63   Resp: 18     Body mass index is 23.95 kg/m². Weight: 75.7 kg (166 lb 14.2 oz) Height: 5' 10" (177.8 cm)       General: Alert, cooperative, no distress, appears stated age   Head: Normocephalic, without obvious abnormality, atraumatic   Eyes: PERRL, conjunctiva/corneas clear   Lungs: Respirations unlabored   Heart: Warm and well perfused   Abdomen: soft NT ND  : normal phallus, uncirc, normal orhotopic meatus, bilaterally desc normal testes without mass  FRAN: 40g+ smooth wide flat no nodules  Extremities: Extremities normal, atraumatic, no cyanosis or edema   Skin: Skin color, texture, turgor normal, no rashes or lesions   Psych: Appropriate   Neurologic: Non-focal       Recent Results (from the past 336 hour(s))   POCT URINE DIPSTICK " WITHOUT MICROSCOPE    Collection Time: 08/03/18  2:01 PM   Result Value Ref Range    Color, UA clear     Spec Grav UA 1.025     pH, UA 5.0     WBC, UA neg     Nitrite, UA neg     Protein trace     Glucose,      Ketones, UA neg     Urobilinogen, UA neg     Bilirubin neg     Blood, UA neg    POCT Bladder Scan    Collection Time: 08/03/18  2:01 PM   Result Value Ref Range    POC Residual Urine Volume 34 0 - 100 mL       ASSESSMENT   1. BPH with urinary obstruction  POCT URINE DIPSTICK WITHOUT MICROSCOPE    POCT Bladder Scan   2. Elevated PSA         Plan    He does still have mild bothersome lower urinary tract symptoms despite alpha-blocker, Flomax.  He was intolerant of double dose, though has continue 0.4 mg dose daily without consequence.  He is comfortable with his urinary habits at this time despite mild bothersome persistent symptoms.  We did again review further treatment options for his BPH/LUTS such as dual medical therapy with Flomax and a 5 alpha reductase inhibitor.  We also discussed formal lower urinary tract evaluation for obstruction with cystoscopy plus or minus concurrent transrectal ultrasound-guided accurate volumetric measurement of prostate to help guide further recommendations, again discussing potential interventions such as Urolift and TURP as dictated by prostate anatomy in size.  Still not interested at this time and any further workup for procedures.  Will consider.   Before adding 5 alpha reductase inhibitor, given history of age specific elevated PSA, would like at least 1 more re-evaluation with free and total PSA to ensure stability.  We did again discuss that he has had an age specific elevated PSA though has been stable with reasonable free PSA.  Should PSA have risen or free PSA be concerning the low, would advocate prostate biopsy at which case cystoscopy can be performed concurrently.  If PSA is stable, can continue to screen annually.  He does have a large prostate,  estimated 40 g, and therefore his PSA may be related to his enlarged prostate as his estimated PSA density is low/normal.  He would like to get his lab work done at [facility] and therefore handwritten prescription has been provided for free and total PSA.  I will chart check the results.    When calling with results can revisit adding finasteride.  Otherwise, RTC annually with PSA prior or p.r.n. in the interim.  If would like to proceed with lower tract evaluation with cystoscopy/trus, can call back at any time

## 2018-08-23 RX ORDER — LOSARTAN POTASSIUM 50 MG/1
TABLET ORAL
Qty: 90 TABLET | Refills: 0 | Status: SHIPPED | OUTPATIENT
Start: 2018-08-23 | End: 2019-04-26 | Stop reason: SDUPTHER

## 2018-08-24 LAB
ALBUMIN SERPL-MCNC: 4.6 G/DL (ref 3.1–4.7)
ALP SERPL-CCNC: 49 IU/L (ref 40–104)
ALT (SGPT): 28 IU/L (ref 3–33)
AST SERPL-CCNC: 26 IU/L (ref 10–40)
BASOPHILS NFR BLD: 0 K/UL (ref 0–0.2)
BASOPHILS NFR BLD: 0.3 %
BILIRUB SERPL-MCNC: 1.2 MG/DL (ref 0.3–1)
BUN SERPL-MCNC: 16 MG/DL (ref 8–20)
CALCIUM SERPL-MCNC: 8.9 MG/DL (ref 7.7–10.4)
CHLORIDE: 104 MMOL/L (ref 98–110)
CO2 SERPL-SCNC: 27.9 MMOL/L (ref 22.8–31.6)
CREATININE: 0.93 MG/DL (ref 0.6–1.4)
EOSINOPHIL NFR BLD: 0.3 K/UL (ref 0–0.7)
EOSINOPHIL NFR BLD: 5.1 %
ERYTHROCYTE [DISTWIDTH] IN BLOOD BY AUTOMATED COUNT: 11.9 % (ref 11.7–14.9)
GLUCOSE: 103 MG/DL (ref 70–99)
GRAN #: 2.9 K/UL (ref 1.4–6.5)
GRAN%: 47.3 %
HCT VFR BLD AUTO: 39.3 % (ref 39–55)
HGB BLD-MCNC: 13.6 G/DL (ref 14–16)
IMMATURE GRANS (ABS): 0 K/UL (ref 0–1)
IMMATURE GRANULOCYTES: 0.2 %
LYMPH #: 2.5 K/UL (ref 1.2–3.4)
LYMPH%: 41.2 %
MCH RBC QN AUTO: 29.7 PG (ref 25–35)
MCHC RBC AUTO-ENTMCNC: 34.6 G/DL (ref 31–36)
MCV RBC AUTO: 85.8 FL (ref 80–100)
MONO #: 0.4 K/UL (ref 0.1–0.6)
MONO%: 5.9 %
NUCLEATED RBCS: 0 %
PLATELET # BLD AUTO: 152 K/UL (ref 140–440)
PMV BLD AUTO: 11.4 FL (ref 8.8–12.7)
POTASSIUM SERPL-SCNC: 4.1 MMOL/L (ref 3.5–5)
PROT SERPL-MCNC: 7.3 G/DL (ref 6–8.2)
RBC # BLD AUTO: 4.58 M/UL (ref 4.3–5.9)
SODIUM: 139 MMOL/L (ref 134–144)
TSH SERPL DL<=0.005 MIU/L-ACNC: 2.88 ULU/ML (ref 0.3–5.6)
WBC # BLD AUTO: 6.1 K/UL (ref 5–10)

## 2018-08-25 LAB — HCV AB SERPL QL IA: <0.1 S/CO RATIO (ref 0–0.9)

## 2018-08-26 ENCOUNTER — PATIENT MESSAGE (OUTPATIENT)
Dept: UROLOGY | Facility: CLINIC | Age: 58
End: 2018-08-26

## 2018-11-10 DIAGNOSIS — E78.5 HYPERLIPIDEMIA, UNSPECIFIED HYPERLIPIDEMIA TYPE: ICD-10-CM

## 2018-11-12 RX ORDER — ROSUVASTATIN CALCIUM 10 MG/1
TABLET, FILM COATED ORAL
Qty: 90 TABLET | Refills: 2 | Status: SHIPPED | OUTPATIENT
Start: 2018-11-12 | End: 2019-06-14 | Stop reason: SDUPTHER

## 2018-11-26 DIAGNOSIS — G47.25 JET LAG: Primary | ICD-10-CM

## 2018-11-26 DIAGNOSIS — E03.9 HYPOTHYROIDISM, UNSPECIFIED TYPE: ICD-10-CM

## 2018-11-26 RX ORDER — LEVOTHYROXINE SODIUM 88 UG/1
88 TABLET ORAL
Qty: 90 TABLET | Refills: 0 | Status: SHIPPED | OUTPATIENT
Start: 2018-11-26 | End: 2019-04-26 | Stop reason: SDUPTHER

## 2018-11-26 RX ORDER — ESZOPICLONE 3 MG/1
3 TABLET, FILM COATED ORAL NIGHTLY
Qty: 10 TABLET | Refills: 0 | Status: SHIPPED | OUTPATIENT
Start: 2018-11-26 | End: 2018-12-26

## 2018-12-26 RX ORDER — TAMSULOSIN HYDROCHLORIDE 0.4 MG/1
0.4 CAPSULE ORAL DAILY
Qty: 90 CAPSULE | Refills: 2 | Status: SHIPPED | OUTPATIENT
Start: 2018-12-26 | End: 2020-01-15 | Stop reason: SDUPTHER

## 2019-01-25 RX ORDER — LOSARTAN POTASSIUM 50 MG/1
TABLET ORAL
Qty: 90 TABLET | Refills: 0 | Status: SHIPPED | OUTPATIENT
Start: 2019-01-25 | End: 2019-06-14 | Stop reason: SDUPTHER

## 2019-03-17 DIAGNOSIS — R97.20 ELEVATED PSA: Primary | ICD-10-CM

## 2019-04-19 DIAGNOSIS — E03.9 HYPOTHYROIDISM, UNSPECIFIED TYPE: ICD-10-CM

## 2019-04-22 RX ORDER — LEVOTHYROXINE SODIUM 88 UG/1
TABLET ORAL
Qty: 90 TABLET | Refills: 0 | Status: SHIPPED | OUTPATIENT
Start: 2019-04-22 | End: 2019-06-14 | Stop reason: SDUPTHER

## 2019-04-24 ENCOUNTER — TELEPHONE (OUTPATIENT)
Dept: FAMILY MEDICINE | Facility: CLINIC | Age: 59
End: 2019-04-24

## 2019-04-24 DIAGNOSIS — E03.9 HYPOTHYROIDISM, UNSPECIFIED TYPE: Primary | ICD-10-CM

## 2019-04-24 DIAGNOSIS — R73.9 HYPERGLYCEMIA: ICD-10-CM

## 2019-04-24 DIAGNOSIS — E78.5 HYPERLIPIDEMIA, UNSPECIFIED HYPERLIPIDEMIA TYPE: ICD-10-CM

## 2019-04-24 DIAGNOSIS — I10 HYPERTENSION, UNSPECIFIED TYPE: ICD-10-CM

## 2019-04-26 ENCOUNTER — OFFICE VISIT (OUTPATIENT)
Dept: UROLOGY | Facility: CLINIC | Age: 59
End: 2019-04-26
Payer: COMMERCIAL

## 2019-04-26 VITALS
HEART RATE: 55 BPM | SYSTOLIC BLOOD PRESSURE: 119 MMHG | DIASTOLIC BLOOD PRESSURE: 70 MMHG | HEIGHT: 70 IN | WEIGHT: 168.44 LBS | BODY MASS INDEX: 24.11 KG/M2

## 2019-04-26 DIAGNOSIS — N40.1 BPH WITH OBSTRUCTION/LOWER URINARY TRACT SYMPTOMS: Primary | ICD-10-CM

## 2019-04-26 DIAGNOSIS — R97.20 ELEVATED PSA: ICD-10-CM

## 2019-04-26 DIAGNOSIS — N13.8 BPH WITH OBSTRUCTION/LOWER URINARY TRACT SYMPTOMS: Primary | ICD-10-CM

## 2019-04-26 PROCEDURE — 3074F SYST BP LT 130 MM HG: CPT | Mod: CPTII,S$GLB,, | Performed by: UROLOGY

## 2019-04-26 PROCEDURE — 3008F PR BODY MASS INDEX (BMI) DOCUMENTED: ICD-10-PCS | Mod: CPTII,S$GLB,, | Performed by: UROLOGY

## 2019-04-26 PROCEDURE — 3078F DIAST BP <80 MM HG: CPT | Mod: CPTII,S$GLB,, | Performed by: UROLOGY

## 2019-04-26 PROCEDURE — 3008F BODY MASS INDEX DOCD: CPT | Mod: CPTII,S$GLB,, | Performed by: UROLOGY

## 2019-04-26 PROCEDURE — 99214 OFFICE O/P EST MOD 30 MIN: CPT | Mod: S$GLB,,, | Performed by: UROLOGY

## 2019-04-26 PROCEDURE — 99999 PR PBB SHADOW E&M-EST. PATIENT-LVL III: CPT | Mod: PBBFAC,,, | Performed by: UROLOGY

## 2019-04-26 PROCEDURE — 3074F PR MOST RECENT SYSTOLIC BLOOD PRESSURE < 130 MM HG: ICD-10-PCS | Mod: CPTII,S$GLB,, | Performed by: UROLOGY

## 2019-04-26 PROCEDURE — 99214 PR OFFICE/OUTPT VISIT, EST, LEVL IV, 30-39 MIN: ICD-10-PCS | Mod: S$GLB,,, | Performed by: UROLOGY

## 2019-04-26 PROCEDURE — 3078F PR MOST RECENT DIASTOLIC BLOOD PRESSURE < 80 MM HG: ICD-10-PCS | Mod: CPTII,S$GLB,, | Performed by: UROLOGY

## 2019-04-26 PROCEDURE — 99999 PR PBB SHADOW E&M-EST. PATIENT-LVL III: ICD-10-PCS | Mod: PBBFAC,,, | Performed by: UROLOGY

## 2019-04-26 NOTE — PROGRESS NOTES
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     11/3/17:  terminal intermittency, worse in the morning, and need to press on his perineum to get the last few drops of urine out so he does not have postvoid dribbling/leaking  He also noted early morning hesitancy and intermittency of stream, and nocturia ×2, as well as early morning frequency with about 3 morning voids before starting his work day.  DTF 10x  He did also note urinary urgency and was able to pinpoint specific incidences of severe urgency. No UUI. coffee, tea, ++ spicy foods.    AUA SS: 10/2 (3: weak stream; 2: frequency, intermittency). PVR 62cc. FRAN 35-40g benign. Concerned for past MH but on review UA had trc blood 4/2017 though UA Micro had 0-2 rbc, and UA 7/2016 negative. ASA (but off effient) for cardiac stenting 2 years prior. psa 3.3 4/6/17, Cr 0.91 - psa 2.8 in 2013 - No family history of prostate cancer.   Started flomax, discussed conservative measures for OAB, and advised recheck of psa free/total given age specific elevation.     2/2/18: urgency has significantly improved with eliminating most bladder irritants, and notes is worse with tea. Made effort at timed voiding  On Flomax feels that he is improved 70% on a good day, and on a bad day has improved 50% from last visit. Take in AM as helps through day, though NTF down to 1x and flow improved at night.  Occasional constipation, notes that he has a good bowel movement he will urinate better.  AUA SS: 7/2 - medications helped 7 out of 10 - 2: Weak stream; 1: Emptying, frequency, intermittency, urgency, nocturia  PVR by bladder scan: 97cc. 12/9/17: PSA 3.2 (24.7% free), Cr 0.86, eGFR 92  Advised doubling flomax, though returned to 0.4mg dose 2/2 dizziness and reeval of PVR on 4/6/18 was 32cc     8/3/18: Ok with one flomax. Still has exacerbation of LUTS with tea and orange drink. Losartan at night,  "and has nocturia 2x/night. Since switching losartan back to am gets up 1x/night - max 2  Voids before bed, No fluids before bed, Not much double voiding, Some urine trapping/pv dribble. Udip with trc protein, 250 glucose, 34cc pvr by bladder scan  PSA 4.0 (22.8% free) at [facility] 8/24/18    He returns today noting  Some days better than others. Has Q30 min frequency at times  AUA symptom score:  14/3, mixed (3:  Frequency, weak stream; 2:  Emptying, intermittency, urgency; 1:  Straining, sleeping) Flomax 0.4 mg helped 5/10.  He is experiencing retrograde ejaculation which is bothersome on Flomax 0.4 mg.  He only took 2 doses of 0.8 mg due to dizziness and exacerbation of side effect profile  He is stopping fluid intake 2 hr before bed.  Every 1-2 hours he can feel an urge to urinate.  He knows he is not drinking enough water.  His stream is a bit slow.  He does weight around at the end of urinating to make sure he voids to completion.  Repeat PSA with free and total was done at [facility] yesterday and results are still pending    ROS: A comprehensive 10 system review was performed and is negative except as noted above in HPI    PHYSICAL EXAM:    Vitals:    04/26/19 1343   BP: 119/70   Pulse: (!) 55     Body mass index is 24.17 kg/m². Weight: 76.4 kg (168 lb 6.9 oz) Height: 5' 10" (177.8 cm)       General: Alert, cooperative, no distress, appears stated age   Head: Normocephalic, without obvious abnormality, atraumatic   Eyes: PERRL, conjunctiva/corneas clear   Lungs: Respirations unlabored   Heart: Warm and well perfused   Abdomen:  Soft, nontender, nondistended  : normal phallus bilat desc normal testes  FRAN:  40-50 g, smooth, benign, nonnodular  Extremities: Extremities normal, atraumatic, no cyanosis or edema   Skin: Skin color, texture, turgor normal, no rashes or lesions   Psych: Appropriate   Neurologic: Non-focal       ASSESSMENT   1. BPH with obstruction/lower urinary tract symptoms     2. Elevated PSA         Plan "    Does have history of rising PSA, age specific elevation, elevated at last check, though has very large prostate on digital rectal exam and likely has normal PSA density.  Free PSA percentage has been reasonable.  Digital rectal exam is benign.  Did again discuss the concern of age specific elevation/elevated PSA and potential further evaluations with prostate biopsy, or 1st a 3 T MRI of the prostate verses select MD X urine testing.  Risks and benefits of all testing were described.  He would prefer to only undergo prostate biopsy if there are other clinical indicators.   Advised that repeat PSA from yesterday is still pending and will chart check the results and review.  If free PSA percent is declining or PSA is true elevation or continued to rise, would recommend prostate biopsy to evaluate for underlying malignancy before proceeding with any further BPH interventions.  If this is the recommendation, he would like to undergo ancillary testing prior such as noted above.    In focusing on his enlarged prostate with progressive moderate lower urinary symptoms despite alpha blockers, of which he cannot tolerate increased alpha-blocker dose, discuss management strategies such as adding PDE 5 inhibitor for dual medical therapy, minimally invasive BPH interventions such as Urolift, and ultimately surgical management such as TURP.  Did advise that given symptomatic progression on medical therapy, would recommend formal lower urinary tract evaluation with cystoscopy and transrectal ultrasound to evaluate for level of obstruction, evaluate obstructive changes of the bladder, and get accurate volumetric measurement of prostate size, to help guide further recommendations.  He would prefer to discontinue medications secondary to side effects and inability to tolerate increase dose, and does not want to add more medication secondary to side effect profile.  He is interested in minimally invasive BPH interventions and will  call back to schedule cysto/truss.  Advised this will be pending re-evaluation of his PSA, and will chart check these results as well.  Certainly, if we proceed with prostate biopsy is indicated by PSA, a cystoscopy could be performed concurrently.    Extensive risk benefit discussion of all diagnostic and treatment options for both BPH/LUTS, as well as elevated PSA, as discussed above.  All questions the patient had were answered and he is agreeable to treatment plan.  Will chart check results and contact patient with further plans.    25 min spent in encounter, over half in counseling.

## 2019-06-14 ENCOUNTER — OFFICE VISIT (OUTPATIENT)
Dept: FAMILY MEDICINE | Facility: CLINIC | Age: 59
End: 2019-06-14
Payer: COMMERCIAL

## 2019-06-14 VITALS
HEIGHT: 70 IN | HEART RATE: 59 BPM | SYSTOLIC BLOOD PRESSURE: 132 MMHG | RESPIRATION RATE: 16 BRPM | OXYGEN SATURATION: 96 % | DIASTOLIC BLOOD PRESSURE: 70 MMHG | WEIGHT: 164.5 LBS | TEMPERATURE: 97 F | BODY MASS INDEX: 23.55 KG/M2

## 2019-06-14 DIAGNOSIS — I10 HYPERTENSION, UNSPECIFIED TYPE: ICD-10-CM

## 2019-06-14 DIAGNOSIS — E78.01 FAMILIAL HYPERCHOLESTEROLEMIA: ICD-10-CM

## 2019-06-14 DIAGNOSIS — Z00.00 ANNUAL PHYSICAL EXAM: Primary | ICD-10-CM

## 2019-06-14 DIAGNOSIS — E03.9 HYPOTHYROIDISM, UNSPECIFIED TYPE: ICD-10-CM

## 2019-06-14 DIAGNOSIS — R73.9 ELEVATED BLOOD SUGAR: ICD-10-CM

## 2019-06-14 DIAGNOSIS — Z23 NEED FOR SHINGLES VACCINE: ICD-10-CM

## 2019-06-14 PROCEDURE — 99396 PREV VISIT EST AGE 40-64: CPT | Mod: ,,, | Performed by: FAMILY MEDICINE

## 2019-06-14 PROCEDURE — 3008F BODY MASS INDEX DOCD: CPT | Mod: ,,, | Performed by: FAMILY MEDICINE

## 2019-06-14 PROCEDURE — 3075F PR MOST RECENT SYSTOLIC BLOOD PRESS GE 130-139MM HG: ICD-10-PCS | Mod: ,,, | Performed by: FAMILY MEDICINE

## 2019-06-14 PROCEDURE — 3008F PR BODY MASS INDEX (BMI) DOCUMENTED: ICD-10-PCS | Mod: ,,, | Performed by: FAMILY MEDICINE

## 2019-06-14 PROCEDURE — 99396 PR PREVENTIVE VISIT,EST,40-64: ICD-10-PCS | Mod: ,,, | Performed by: FAMILY MEDICINE

## 2019-06-14 PROCEDURE — 3075F SYST BP GE 130 - 139MM HG: CPT | Mod: ,,, | Performed by: FAMILY MEDICINE

## 2019-06-14 PROCEDURE — 3078F PR MOST RECENT DIASTOLIC BLOOD PRESSURE < 80 MM HG: ICD-10-PCS | Mod: ,,, | Performed by: FAMILY MEDICINE

## 2019-06-14 PROCEDURE — 3078F DIAST BP <80 MM HG: CPT | Mod: ,,, | Performed by: FAMILY MEDICINE

## 2019-06-14 RX ORDER — LEVOTHYROXINE SODIUM 88 UG/1
TABLET ORAL
Qty: 90 TABLET | Refills: 3 | Status: SHIPPED | OUTPATIENT
Start: 2019-06-14 | End: 2020-09-01 | Stop reason: SDUPTHER

## 2019-06-14 RX ORDER — ROSUVASTATIN CALCIUM 10 MG/1
10 TABLET, COATED ORAL DAILY
Qty: 90 TABLET | Refills: 3 | Status: SHIPPED | OUTPATIENT
Start: 2019-06-14 | End: 2020-09-01 | Stop reason: SDUPTHER

## 2019-06-14 RX ORDER — LOSARTAN POTASSIUM 50 MG/1
50 TABLET ORAL DAILY
Qty: 90 TABLET | Refills: 3 | Status: SHIPPED | OUTPATIENT
Start: 2019-06-14 | End: 2019-10-29

## 2019-06-14 RX ORDER — ROSUVASTATIN CALCIUM 10 MG/1
10 TABLET, COATED ORAL DAILY
Qty: 90 TABLET | Refills: 3 | Status: SHIPPED | OUTPATIENT
Start: 2019-06-14 | End: 2019-06-14 | Stop reason: SDUPTHER

## 2019-06-14 NOTE — PATIENT INSTRUCTIONS
Taking Your Blood Pressure  Blood pressure is the force of blood against the artery wall as it moves from the heart through the blood vessels. You can take your own blood pressure reading using a digital monitor. Take your readings the same each time, using the same arm. Take readings as often as your healthcare provider instructs.  About blood pressure monitors  Blood pressure monitors are designed for certain ages and cases. You can find monitors for older adults, for pregnant women, and for children. Make sure the one you choose is the right one for your age and situation.  The American Heart Association recommends an automatic cuff monitor that fits on your upper arm (bicep). The cuff should fit your arm size. A cuff thats too large or too small will not give an accurate reading. Measure around your upper arm to find your size.  Monitors that attach to your finger or wrist are not as accurate as monitors for your upper arm.  Ask your healthcare provider for help in choosing a monitor. Bring your monitor to your next provider visit if you need help in using it the correct way.  The steps below are general instructions for using an automatic digital monitor.  Step 1. Relax    · Take your blood pressure at the same time every day, such as in the morning or evening, or at the time your healthcare provider recommends.  · Wait at least a half-hour after smoking, eating, or exercising. Don't drink coffee, tea, soda, or other caffeinated beverages before checking your blood pressure.  · Sit comfortably at a table with both feet on the floor. Do not cross your legs or feet. Place the monitor near you.  · Rest for a few minutes before you begin.  Step 2. Wrap the cuff    · Place your arm on the table, palm up. Your arm should be at the level of your heart. Wrap the cuff around your upper arm, just above your elbow. Its best done on bare skin, not over clothing. Most cuffs will indicate where the brachial artery (the  blood vessel in the middle of the arm at the inner side of the elbow) should line up with the cuff. Look in your monitor's instruction booklet for an illustration. You can also bring your cuff to your healthcare provider and have them show you how to correctly place the cuff.  Step 3. Inflate the cuff    · Push the button that starts the pump.  · The cuff will tighten, then loosen.  · The numbers will change. When they stop changing, your blood pressure reading will appear.  · Take 2 or 3 readings one minute apart.  Step 4. Write down the results of each reading    · Write down your blood pressure numbers for each reading. Note the date and time. Keep your results in one place, such as a notebook. Even if your monitor has a built-in memory, keep a hard copy of the readings.  · Remove the cuff from your arm. Turn off the machine.  · Bring your blood pressure records with your healthcare providers at each visit.  · If you start a new blood pressure medicine, note the day you started the new medicine. Also note the day if you change the dose of your medicine. This information goes on your blood pressure recording sheet. This will help your healthcare provider monitor how well the medicine changes are working.  · Ask your healthcare provider what numbers should prompt you to call him or her. Also ask what numbers should prompt you to get help right away.  Date Last Reviewed: 11/1/2016  © 1057-0459 [employer]. [address] [place] [address], [place] [address]. All rights reserved. This information is not intended as a substitute for professional medical care. Always follow your healthcare professional's instructions.

## 2019-06-14 NOTE — PROGRESS NOTES
SUBJECTIVE:    Patient ID: Nelson Wallace is a 58 y.o. male.    Chief Complaint: Annual Exam  59 yo male presets to clinic for his annual exam. Pt has a hx of HTN, HLD, Hypothyroid, CAD, and BPH.  Pt is doing well no complaints today but will need medications refills.  His Prostate is follow by Dr Daly  His CAD is followed by Dr Donahue.    HPI      Past Medical History:   Diagnosis Date    Allergy     Atherosclerosis of native coronary artery of native heart without angina pectoris     Elevated blood pressure     Elevated PSA     GERD (gastroesophageal reflux disease)     Hyperlipemia     Hypertension     Hypothyroidism      Social History     Socioeconomic History    Marital status:      Spouse name: Not on file    Number of children: Not on file    Years of education: Not on file    Highest education level: Not on file   Occupational History     Employer: [employer]   Social Needs    Financial resource strain: Not on file    Food insecurity:     Worry: Not on file     Inability: Not on file    Transportation needs:     Medical: Not on file     Non-medical: Not on file   Tobacco Use    Smoking status: Never Smoker    Smokeless tobacco: Never Used   Substance and Sexual Activity    Alcohol use: No    Drug use: No    Sexual activity: Yes   Lifestyle    Physical activity:     Days per week: Not on file     Minutes per session: Not on file    Stress: Not at all   Relationships    Social connections:     Talks on phone: Not on file     Gets together: Not on file     Attends Rastafarian service: Not on file     Active member of club or organization: Not on file     Attends meetings of clubs or organizations: Not on file     Relationship status: Not on file   Other Topics Concern    Not on file   Social History Narrative    Not on file     Past Surgical History:   Procedure Laterality Date    CORONARY ANGIOPLASTY WITH STENT PLACEMENT       Family History   Problem  Relation Age of Onset    Osteoporosis Mother     Hypertension Mother     Hypertension Father     Hypertension Brother      Current Outpatient Medications   Medication Sig Dispense Refill    aspirin (ECOTRIN) 81 MG EC tablet Take 81 mg by mouth once daily.      cholecalciferol, vitamin D3, (VITAMIN D3) 5,000 unit Tab Take 1,000 Units by mouth once daily.       levothyroxine (SYNTHROID) 88 MCG tablet TAKE 1 TABLET BY MOUTH ONCE A DAY BEFORE BREAKFAST 90 tablet 3    losartan (COZAAR) 50 MG tablet Take 1 tablet (50 mg total) by mouth once daily. 90 tablet 3    tamsulosin (FLOMAX) 0.4 mg Cap TAKE 1 CAPSULE (0.4 MG TOTAL) BY MOUTH ONCE DAILY. 90 capsule 2    adjuvant AS01B, PF,vial 1 of 2 (SHINGRIX ADJUVANT COMPONENT-PF) Susp Inject 0.5 mLs into the muscle once. for 1 dose 0.5 mL 0    rosuvastatin (CRESTOR) 10 MG tablet Take 1 tablet (10 mg total) by mouth once daily. 90 tablet 3     No current facility-administered medications for this visit.      Review of patient's allergies indicates:  No Known Allergies    Review of Systems   Constitutional: Negative for activity change, appetite change, fatigue and fever.   HENT: Negative for congestion, ear pain, hearing loss, postnasal drip, sinus pressure, sinus pain, sneezing and sore throat.    Eyes: Negative for photophobia and pain.   Respiratory: Negative for cough, chest tightness, shortness of breath and wheezing.    Cardiovascular: Negative for chest pain and leg swelling.   Gastrointestinal: Negative for abdominal distention, abdominal pain, blood in stool, constipation, diarrhea, nausea and vomiting.   Endocrine: Negative for cold intolerance, heat intolerance, polydipsia and polyuria.   Genitourinary: Negative for difficulty urinating, dysuria, flank pain, frequency, hematuria and urgency.   Musculoskeletal: Negative for arthralgias, back pain, joint swelling, myalgias and neck pain.   Skin: Negative for pallor and rash.   Allergic/Immunologic: Negative for  "environmental allergies and food allergies.   Neurological: Negative for dizziness, weakness, light-headedness and headaches.   Hematological: Does not bruise/bleed easily.   Psychiatric/Behavioral: Negative for confusion, decreased concentration and sleep disturbance. The patient is not nervous/anxious.           Blood pressure 132/70, pulse (!) 59, temperature 97.4 °F (36.3 °C), temperature source Oral, resp. rate 16, height 5' 10" (1.778 m), weight 74.6 kg (164 lb 8 oz), SpO2 96 %. Body mass index is 23.6 kg/m².   Objective:      Physical Exam   Constitutional: He is oriented to person, place, and time. He appears well-developed and well-nourished. No distress.   HENT:   Head: Normocephalic and atraumatic.   Right Ear: External ear normal.   Left Ear: External ear normal.   Mouth/Throat: Oropharynx is clear and moist.   Eyes: Pupils are equal, round, and reactive to light. Conjunctivae are normal. Right eye exhibits no discharge. Left eye exhibits no discharge.   Cardiovascular: Normal rate, regular rhythm and normal heart sounds.   No murmur heard.  Pulmonary/Chest: Effort normal and breath sounds normal. No respiratory distress. He has no wheezes.   Neurological: He is alert and oriented to person, place, and time.   Skin: Skin is warm and dry. He is not diaphoretic.           Assessment:       1. Annual physical exam    2. Hypothyroidism, unspecified type    3. Hypertension, unspecified type    4. Elevated blood sugar    5. Familial hypercholesterolemia    6. Need for shingles vaccine         Plan:           Annual physical exam  -     Lipid panel; Future; Expected date: 06/14/2019  Doing well no complaints will get some screening labs to follo wthe results.    Hypothyroidism, unspecified type  -     TSH; Future; Expected date: 06/14/2019  -     levothyroxine (SYNTHROID) 88 MCG tablet; TAKE 1 TABLET BY MOUTH ONCE A DAY BEFORE BREAKFAST  Dispense: 90 tablet; Refill: 3  Will refill and get TSH and adjust as " needed.    Hypertension, unspecified type  -     Comprehensive metabolic panel; Future; Expected date: 06/14/2019  -     losartan (COZAAR) 50 MG tablet; Take 1 tablet (50 mg total) by mouth once daily.  Dispense: 90 tablet; Refill: 3  -     Comprehensive metabolic panel; Future; Expected date: 06/14/2019  Well controlled.    Elevated blood sugar  -     Hemoglobin A1c; Future; Expected date: 06/14/2019  Will screen with A1C    Familial hypercholesterolemia  -     rosuvastatin (CRESTOR) 10 MG tablet; Take 1 tablet (10 mg total) by mouth once daily.  Dispense: 90 tablet; Refill: 3  Will refill.    Need for shingles vaccine  -     adjuvant AS01B, PF,vial 1 of 2 (SHINGRIX ADJUVANT COMPONENT-PF) Susp; Inject 0.5 mLs into the muscle once. for 1 dose  Dispense: 0.5 mL; Refill: 0  Routine health maint    Other orders  -     Discontinue: rosuvastatin (CRESTOR) 10 MG tablet; Take 1 tablet (10 mg total) by mouth once daily.  Dispense: 90 tablet; Refill: 3

## 2019-07-20 DIAGNOSIS — E03.9 PRIMARY HYPOTHYROIDISM: ICD-10-CM

## 2019-07-20 DIAGNOSIS — E78.5 HYPERLIPEMIA: ICD-10-CM

## 2019-07-20 DIAGNOSIS — I25.10 CORONARY ATHEROSCLEROSIS OF NATIVE CORONARY ARTERY: Primary | ICD-10-CM

## 2019-07-26 LAB
ALBUMIN SERPL-MCNC: 4.1 G/DL (ref 3.1–4.7)
ALP SERPL-CCNC: 48 IU/L (ref 40–104)
ALT (SGPT): 24 IU/L (ref 3–33)
AST SERPL-CCNC: 20 IU/L (ref 10–40)
BILIRUB SERPL-MCNC: 1.2 MG/DL (ref 0.3–1)
BUN SERPL-MCNC: 15 MG/DL (ref 8–20)
CALCIUM SERPL-MCNC: 8.6 MG/DL (ref 7.7–10.4)
CHLORIDE: 107 MMOL/L (ref 98–110)
CO2 SERPL-SCNC: 24.2 MMOL/L (ref 22.8–31.6)
CREATININE: 0.87 MG/DL (ref 0.6–1.4)
GLUCOSE: 109 MG/DL (ref 70–99)
MAGNESIUM SERPL-MCNC: 2.1 MG/DL (ref 1.5–2.6)
POTASSIUM SERPL-SCNC: 3.9 MMOL/L (ref 3.5–5)
PROT SERPL-MCNC: 6.7 G/DL (ref 6–8.2)
SODIUM: 136 MMOL/L (ref 134–144)
TSH SERPL DL<=0.005 MIU/L-ACNC: 1.22 ULU/ML (ref 0.3–5.6)

## 2019-08-08 ENCOUNTER — TELEPHONE (OUTPATIENT)
Dept: CARDIOLOGY | Facility: HOSPITAL | Age: 59
End: 2019-08-08

## 2019-08-09 ENCOUNTER — CLINICAL SUPPORT (OUTPATIENT)
Dept: CARDIOLOGY | Facility: HOSPITAL | Age: 59
End: 2019-08-09
Attending: INTERNAL MEDICINE
Payer: COMMERCIAL

## 2019-08-09 VITALS — WEIGHT: 164 LBS | BODY MASS INDEX: 23.48 KG/M2 | HEIGHT: 70 IN

## 2019-08-09 VITALS — BODY MASS INDEX: 23.48 KG/M2 | WEIGHT: 164 LBS | HEIGHT: 70 IN

## 2019-08-09 DIAGNOSIS — I25.10 CORONARY ATHEROSCLEROSIS OF NATIVE CORONARY ARTERY: ICD-10-CM

## 2019-08-09 DIAGNOSIS — E03.9 PRIMARY HYPOTHYROIDISM: ICD-10-CM

## 2019-08-09 DIAGNOSIS — E78.5 HYPERLIPEMIA: ICD-10-CM

## 2019-08-09 PROCEDURE — 93306 TTE W/DOPPLER COMPLETE: CPT

## 2019-08-09 PROCEDURE — 93351 STRESS TTE COMPLETE: CPT

## 2019-08-29 LAB
AORTIC ROOT ANNULUS: 2.9 CM
AORTIC VALVE CUSP SEPERATION: 2.07 CM
AV INDEX (PROSTH): 1.13
AV MEAN GRADIENT: 3 MMHG
AV PEAK GRADIENT: 7 MMHG
AV VALVE AREA: 3.33 CM2
AV VELOCITY RATIO: 99.31
BSA FOR ECHO PROCEDURE: 1.92 M2
BSA FOR ECHO PROCEDURE: 1.92 M2
CV ECHO LV RWT: 0.38 CM
CV STRESS BASE HR: 49 BPM
DIASTOLIC BLOOD PRESSURE: 69 MMHG
DOP CALC AO PEAK VEL: 1.36 M/S
DOP CALC AO VTI: 24.72 CM
DOP CALC LVOT AREA: 3 CM2
DOP CALC LVOT DIAMETER: 1.94 CM
DOP CALC LVOT PEAK VEL: 135.06 M/S
DOP CALC LVOT STROKE VOLUME: 82.34 CM3
DOP CALCLVOT PEAK VEL VTI: 27.87 CM
E WAVE DECELERATION TIME: 182.87 MSEC
E/A RATIO: 1.02
E/E' RATIO: 7.14 M/S
ECHO LV POSTERIOR WALL: 0.96 CM (ref 0.6–1.1)
FRACTIONAL SHORTENING: 49 % (ref 28–44)
INTERVENTRICULAR SEPTUM: 0.81 CM (ref 0.6–1.1)
IVRT: 99.45 MSEC
LEFT ATRIUM SIZE: 3.83 CM
LEFT INTERNAL DIMENSION IN SYSTOLE: 2.56 CM (ref 2.1–4)
LEFT VENTRICLE DIASTOLIC VOLUME INDEX: 30.65 ML/M2
LEFT VENTRICLE DIASTOLIC VOLUME: 58.8 ML
LEFT VENTRICLE MASS INDEX: 81 G/M2
LEFT VENTRICLE SYSTOLIC VOLUME INDEX: 8.5 ML/M2
LEFT VENTRICLE SYSTOLIC VOLUME: 16.3 ML
LEFT VENTRICULAR INTERNAL DIMENSION IN DIASTOLE: 5.03 CM (ref 3.5–6)
LEFT VENTRICULAR MASS: 156.32 G
LV LATERAL E/E' RATIO: 5.56 M/S
LV SEPTAL E/E' RATIO: 10 M/S
MV PEAK A VEL: 0.98 M/S
MV PEAK E VEL: 1 M/S
OHS CV CPX 1 MINUTE RECOVERY HEART RATE: 77 BPM
OHS CV CPX 85 PERCENT MAX PREDICTED HEART RATE MALE: 138
OHS CV CPX ESTIMATED METS: 11
OHS CV CPX MAX PREDICTED HEART RATE: 162
OHS CV CPX PATIENT IS FEMALE: 0
OHS CV CPX PATIENT IS MALE: 1
OHS CV CPX PEAK DIASTOLIC BLOOD PRESSURE: 53 MMHG
OHS CV CPX PEAK HEAR RATE: 138 BPM
OHS CV CPX PEAK RATE PRESSURE PRODUCT: NORMAL
OHS CV CPX PEAK SYSTOLIC BLOOD PRESSURE: 188 MMHG
OHS CV CPX PERCENT MAX PREDICTED HEART RATE ACHIEVED: 85
OHS CV CPX RATE PRESSURE PRODUCT PRESENTING: 5978
PULM VEIN S/D RATIO: 1.21
PV PEAK D VEL: 47.32 M/S
PV PEAK S VEL: 57.1 M/S
PV PEAK VELOCITY: 117.33 CM/S
RIGHT VENTRICULAR END-DIASTOLIC DIMENSION: 258 CM
STRESS ECHO POST EXERCISE DUR MIN: 9 MINUTES
STRESS ECHO POST EXERCISE DUR SEC: 30 SECONDS
SYSTOLIC BLOOD PRESSURE: 122 MMHG
TDI LATERAL: 0.18 M/S
TDI SEPTAL: 0.1 M/S
TDI: 0.14 M/S

## 2019-09-04 ENCOUNTER — TELEPHONE (OUTPATIENT)
Dept: UROLOGY | Facility: CLINIC | Age: 59
End: 2019-09-04

## 2019-09-04 NOTE — TELEPHONE ENCOUNTER
PSA at [facility] 4/26/19 is 4.2 (26% free)    Have had multiple discussions with patient about his rising psa and evaluating for malignancy with prostate biopsy before working up for BPH intervention.    Most recently revisited with patient and he is considering possible dates to proceed

## 2019-10-29 RX ORDER — LISINOPRIL 10 MG/1
10 TABLET ORAL DAILY
Qty: 90 TABLET | Refills: 3 | Status: SHIPPED | OUTPATIENT
Start: 2019-10-29 | End: 2020-10-09 | Stop reason: ALTCHOICE

## 2019-10-29 RX ORDER — FLUTICASONE FUROATE AND VILANTEROL 100; 25 UG/1; UG/1
1 POWDER RESPIRATORY (INHALATION) DAILY
Qty: 1 EACH | Refills: 0 | COMMUNITY
Start: 2019-10-29 | End: 2020-10-09 | Stop reason: ALTCHOICE

## 2020-01-04 DIAGNOSIS — R97.20 ELEVATED PSA: Primary | ICD-10-CM

## 2020-01-15 RX ORDER — TAMSULOSIN HYDROCHLORIDE 0.4 MG/1
0.4 CAPSULE ORAL DAILY
Qty: 90 CAPSULE | Refills: 2 | Status: SHIPPED | OUTPATIENT
Start: 2020-01-15 | End: 2020-04-27 | Stop reason: SDUPTHER

## 2020-01-17 DIAGNOSIS — I25.10 CORONARY ATHEROSCLEROSIS OF NATIVE CORONARY ARTERY: ICD-10-CM

## 2020-01-17 DIAGNOSIS — R07.9 CHEST PAIN, UNSPECIFIED: Primary | ICD-10-CM

## 2020-01-23 ENCOUNTER — LAB VISIT (OUTPATIENT)
Dept: LAB | Facility: HOSPITAL | Age: 60
End: 2020-01-23
Attending: INTERNAL MEDICINE
Payer: COMMERCIAL

## 2020-01-23 DIAGNOSIS — R97.20 ELEVATED PROSTATE SPECIFIC ANTIGEN (PSA): Primary | ICD-10-CM

## 2020-01-23 PROCEDURE — 36415 COLL VENOUS BLD VENIPUNCTURE: CPT

## 2020-01-23 PROCEDURE — 84153 ASSAY OF PSA TOTAL: CPT

## 2020-01-24 LAB
PSA FREE MFR SERPL: 23.8 %
PSA FREE SERPL-MCNC: 0.95 NG/ML
PSA SERPL-MCNC: 4 NG/ML (ref 0–4)

## 2020-02-01 ENCOUNTER — LAB VISIT (OUTPATIENT)
Dept: LAB | Facility: HOSPITAL | Age: 60
End: 2020-02-01
Attending: INTERNAL MEDICINE
Payer: COMMERCIAL

## 2020-02-01 DIAGNOSIS — R07.9 CHEST PAIN, UNSPECIFIED: ICD-10-CM

## 2020-02-01 DIAGNOSIS — I25.10 CORONARY ATHEROSCLEROSIS OF NATIVE CORONARY ARTERY: Primary | ICD-10-CM

## 2020-02-01 DIAGNOSIS — E78.5 HYPERLIPEMIA: ICD-10-CM

## 2020-02-01 LAB
ALBUMIN SERPL BCP-MCNC: 4.2 G/DL (ref 3.5–5.2)
ALP SERPL-CCNC: 47 U/L (ref 55–135)
ALT SERPL W/O P-5'-P-CCNC: 27 U/L (ref 10–44)
ANION GAP SERPL CALC-SCNC: 8 MMOL/L (ref 8–16)
AST SERPL-CCNC: 22 U/L (ref 10–40)
BASOPHILS # BLD AUTO: 0.03 K/UL (ref 0–0.2)
BASOPHILS NFR BLD: 0.4 % (ref 0–1.9)
BILIRUB SERPL-MCNC: 1.2 MG/DL (ref 0.1–1)
BUN SERPL-MCNC: 17 MG/DL (ref 6–20)
CALCIUM SERPL-MCNC: 8.9 MG/DL (ref 8.7–10.5)
CHLORIDE SERPL-SCNC: 106 MMOL/L (ref 95–110)
CHOLEST SERPL-MCNC: 140 MG/DL (ref 120–199)
CHOLEST/HDLC SERPL: 3.1 {RATIO} (ref 2–5)
CO2 SERPL-SCNC: 26 MMOL/L (ref 23–29)
CREAT SERPL-MCNC: 1 MG/DL (ref 0.5–1.4)
CRP SERPL-MCNC: 0.02 MG/DL (ref 0–0.75)
DIFFERENTIAL METHOD: NORMAL
EOSINOPHIL # BLD AUTO: 0.2 K/UL (ref 0–0.5)
EOSINOPHIL NFR BLD: 2.9 % (ref 0–8)
ERYTHROCYTE [DISTWIDTH] IN BLOOD BY AUTOMATED COUNT: 11.8 % (ref 11.5–14.5)
EST. GFR  (AFRICAN AMERICAN): >60 ML/MIN/1.73 M^2
EST. GFR  (NON AFRICAN AMERICAN): >60 ML/MIN/1.73 M^2
ESTIMATED AVG GLUCOSE: 120 MG/DL (ref 68–131)
GLUCOSE SERPL-MCNC: 114 MG/DL (ref 70–110)
HBA1C MFR BLD HPLC: 5.8 % (ref 4.5–6.2)
HCT VFR BLD AUTO: 41.7 % (ref 40–54)
HDLC SERPL-MCNC: 45 MG/DL (ref 40–75)
HDLC SERPL: 32.1 % (ref 20–50)
HGB BLD-MCNC: 14.1 G/DL (ref 14–18)
IMM GRANULOCYTES # BLD AUTO: 0.02 K/UL (ref 0–0.04)
IMM GRANULOCYTES NFR BLD AUTO: 0.3 % (ref 0–0.5)
LDLC SERPL CALC-MCNC: 78.6 MG/DL (ref 63–159)
LYMPHOCYTES # BLD AUTO: 2.7 K/UL (ref 1–4.8)
LYMPHOCYTES NFR BLD: 37.4 % (ref 18–48)
MCH RBC QN AUTO: 28.9 PG (ref 27–31)
MCHC RBC AUTO-ENTMCNC: 33.8 G/DL (ref 32–36)
MCV RBC AUTO: 86 FL (ref 82–98)
MONOCYTES # BLD AUTO: 0.4 K/UL (ref 0.3–1)
MONOCYTES NFR BLD: 4.9 % (ref 4–15)
NEUTROPHILS # BLD AUTO: 3.9 K/UL (ref 1.8–7.7)
NEUTROPHILS NFR BLD: 54.1 % (ref 38–73)
NONHDLC SERPL-MCNC: 95 MG/DL
NRBC BLD-RTO: 0 /100 WBC
PLATELET # BLD AUTO: 151 K/UL (ref 150–350)
PMV BLD AUTO: 11.1 FL (ref 9.2–12.9)
POTASSIUM SERPL-SCNC: 4.4 MMOL/L (ref 3.5–5.1)
PROT SERPL-MCNC: 7.1 G/DL (ref 6–8.4)
RBC # BLD AUTO: 4.88 M/UL (ref 4.6–6.2)
SODIUM SERPL-SCNC: 140 MMOL/L (ref 136–145)
TRIGL SERPL-MCNC: 82 MG/DL (ref 30–150)
WBC # BLD AUTO: 7.29 K/UL (ref 3.9–12.7)

## 2020-02-01 PROCEDURE — 80053 COMPREHEN METABOLIC PANEL: CPT

## 2020-02-01 PROCEDURE — 83090 ASSAY OF HOMOCYSTEINE: CPT

## 2020-02-01 PROCEDURE — 80061 LIPID PANEL: CPT

## 2020-02-01 PROCEDURE — 86140 C-REACTIVE PROTEIN: CPT

## 2020-02-01 PROCEDURE — 85025 COMPLETE CBC W/AUTO DIFF WBC: CPT

## 2020-02-01 PROCEDURE — 83036 HEMOGLOBIN GLYCOSYLATED A1C: CPT

## 2020-02-03 LAB — HCYS SERPL-SCNC: 9.5 UMOL/L (ref 0–14.5)

## 2020-02-28 DIAGNOSIS — R68.89 FLU-LIKE SYMPTOMS: Primary | ICD-10-CM

## 2020-02-28 RX ORDER — OSELTAMIVIR PHOSPHATE 75 MG/1
75 CAPSULE ORAL 2 TIMES DAILY
Qty: 10 CAPSULE | Refills: 0 | Status: SHIPPED | OUTPATIENT
Start: 2020-02-28 | End: 2020-03-04

## 2020-04-23 DIAGNOSIS — Z01.84 ANTIBODY RESPONSE EXAMINATION: ICD-10-CM

## 2020-04-27 RX ORDER — TAMSULOSIN HYDROCHLORIDE 0.4 MG/1
0.4 CAPSULE ORAL DAILY
Qty: 30 CAPSULE | Refills: 3 | Status: SHIPPED | OUTPATIENT
Start: 2020-04-27 | End: 2020-08-25 | Stop reason: SDUPTHER

## 2020-04-28 ENCOUNTER — LAB VISIT (OUTPATIENT)
Dept: LAB | Facility: HOSPITAL | Age: 60
End: 2020-04-28
Attending: INTERNAL MEDICINE
Payer: COMMERCIAL

## 2020-04-28 DIAGNOSIS — Z01.84 ANTIBODY RESPONSE EXAMINATION: ICD-10-CM

## 2020-04-28 LAB — SARS-COV-2 IGG SERPL QL IA: NEGATIVE

## 2020-04-28 PROCEDURE — 36415 COLL VENOUS BLD VENIPUNCTURE: CPT

## 2020-04-28 PROCEDURE — 86769 SARS-COV-2 COVID-19 ANTIBODY: CPT

## 2020-08-12 ENCOUNTER — TELEPHONE (OUTPATIENT)
Dept: FAMILY MEDICINE | Facility: CLINIC | Age: 60
End: 2020-08-12

## 2020-08-25 RX ORDER — TAMSULOSIN HYDROCHLORIDE 0.4 MG/1
0.4 CAPSULE ORAL DAILY
Qty: 90 CAPSULE | Refills: 3 | Status: SHIPPED | OUTPATIENT
Start: 2020-08-25 | End: 2021-06-01 | Stop reason: SDUPTHER

## 2020-09-01 DIAGNOSIS — E03.9 HYPOTHYROIDISM, UNSPECIFIED TYPE: ICD-10-CM

## 2020-09-01 DIAGNOSIS — E78.01 FAMILIAL HYPERCHOLESTEROLEMIA: ICD-10-CM

## 2020-09-01 RX ORDER — ROSUVASTATIN CALCIUM 10 MG/1
10 TABLET, COATED ORAL DAILY
Qty: 90 TABLET | Refills: 3 | Status: SHIPPED | OUTPATIENT
Start: 2020-09-01 | End: 2020-09-01 | Stop reason: SDUPTHER

## 2020-09-01 RX ORDER — LEVOTHYROXINE SODIUM 88 UG/1
TABLET ORAL
Qty: 90 TABLET | Refills: 3 | Status: SHIPPED | OUTPATIENT
Start: 2020-09-01 | End: 2021-03-12 | Stop reason: SDUPTHER

## 2020-09-02 RX ORDER — ROSUVASTATIN CALCIUM 10 MG/1
10 TABLET, COATED ORAL DAILY
Qty: 90 TABLET | Refills: 3 | Status: SHIPPED | OUTPATIENT
Start: 2020-09-02 | End: 2021-03-12 | Stop reason: SDUPTHER

## 2020-09-05 DIAGNOSIS — R97.20 ELEVATED PSA: Primary | ICD-10-CM

## 2020-09-16 DIAGNOSIS — R97.20 ELEVATED PSA: Primary | ICD-10-CM

## 2020-10-02 ENCOUNTER — LAB VISIT (OUTPATIENT)
Dept: LAB | Facility: HOSPITAL | Age: 60
End: 2020-10-02
Attending: UROLOGY
Payer: COMMERCIAL

## 2020-10-02 DIAGNOSIS — R97.20 ELEVATED PSA: ICD-10-CM

## 2020-10-02 PROCEDURE — 36415 COLL VENOUS BLD VENIPUNCTURE: CPT

## 2020-10-02 PROCEDURE — 84154 ASSAY OF PSA FREE: CPT

## 2020-10-03 LAB
PSA FREE MFR SERPL: 24.5 %
PSA FREE SERPL-MCNC: 1.08 NG/ML
PSA SERPL-MCNC: 4.4 NG/ML (ref 0–4)

## 2020-10-09 ENCOUNTER — OFFICE VISIT (OUTPATIENT)
Dept: UROLOGY | Facility: CLINIC | Age: 60
End: 2020-10-09
Payer: COMMERCIAL

## 2020-10-09 VITALS
WEIGHT: 167.56 LBS | HEART RATE: 51 BPM | HEIGHT: 70 IN | SYSTOLIC BLOOD PRESSURE: 123 MMHG | BODY MASS INDEX: 23.99 KG/M2 | DIASTOLIC BLOOD PRESSURE: 80 MMHG

## 2020-10-09 DIAGNOSIS — N40.1 BPH WITH OBSTRUCTION/LOWER URINARY TRACT SYMPTOMS: Primary | ICD-10-CM

## 2020-10-09 DIAGNOSIS — R97.20 ELEVATED PSA: ICD-10-CM

## 2020-10-09 DIAGNOSIS — N13.8 BPH WITH OBSTRUCTION/LOWER URINARY TRACT SYMPTOMS: Primary | ICD-10-CM

## 2020-10-09 LAB
BILIRUB SERPL-MCNC: NORMAL MG/DL
BLOOD URINE, POC: NORMAL
CLARITY, POC UA: CLEAR
COLOR, POC UA: YELLOW
GLUCOSE UR QL STRIP: NORMAL
KETONES UR QL STRIP: NORMAL
LEUKOCYTE ESTERASE URINE, POC: NORMAL
NITRITE, POC UA: NORMAL
PH, POC UA: 5
POC RESIDUAL URINE VOLUME: 58 ML (ref 0–100)
PROTEIN, POC: NORMAL
SPECIFIC GRAVITY, POC UA: 1.02
UROBILINOGEN, POC UA: 0.2

## 2020-10-09 PROCEDURE — 99215 PR OFFICE/OUTPT VISIT, EST, LEVL V, 40-54 MIN: ICD-10-PCS | Mod: S$GLB,,, | Performed by: UROLOGY

## 2020-10-09 PROCEDURE — 3008F BODY MASS INDEX DOCD: CPT | Mod: CPTII,S$GLB,, | Performed by: UROLOGY

## 2020-10-09 PROCEDURE — 3074F PR MOST RECENT SYSTOLIC BLOOD PRESSURE < 130 MM HG: ICD-10-PCS | Mod: CPTII,S$GLB,, | Performed by: UROLOGY

## 2020-10-09 PROCEDURE — 3008F PR BODY MASS INDEX (BMI) DOCUMENTED: ICD-10-PCS | Mod: CPTII,S$GLB,, | Performed by: UROLOGY

## 2020-10-09 PROCEDURE — 81002 URINALYSIS NONAUTO W/O SCOPE: CPT | Mod: S$GLB,,, | Performed by: UROLOGY

## 2020-10-09 PROCEDURE — 81002 POCT URINE DIPSTICK WITHOUT MICROSCOPE: ICD-10-PCS | Mod: S$GLB,,, | Performed by: UROLOGY

## 2020-10-09 PROCEDURE — 3079F PR MOST RECENT DIASTOLIC BLOOD PRESSURE 80-89 MM HG: ICD-10-PCS | Mod: CPTII,S$GLB,, | Performed by: UROLOGY

## 2020-10-09 PROCEDURE — 3079F DIAST BP 80-89 MM HG: CPT | Mod: CPTII,S$GLB,, | Performed by: UROLOGY

## 2020-10-09 PROCEDURE — 51798 POCT BLADDER SCAN: ICD-10-PCS | Mod: S$GLB,,, | Performed by: UROLOGY

## 2020-10-09 PROCEDURE — 99215 OFFICE O/P EST HI 40 MIN: CPT | Mod: S$GLB,,, | Performed by: UROLOGY

## 2020-10-09 PROCEDURE — 3074F SYST BP LT 130 MM HG: CPT | Mod: CPTII,S$GLB,, | Performed by: UROLOGY

## 2020-10-09 PROCEDURE — 99999 PR PBB SHADOW E&M-EST. PATIENT-LVL IV: ICD-10-PCS | Mod: PBBFAC,,, | Performed by: UROLOGY

## 2020-10-09 PROCEDURE — 51798 US URINE CAPACITY MEASURE: CPT | Mod: S$GLB,,, | Performed by: UROLOGY

## 2020-10-09 PROCEDURE — 99999 PR PBB SHADOW E&M-EST. PATIENT-LVL IV: CPT | Mod: PBBFAC,,, | Performed by: UROLOGY

## 2020-10-09 NOTE — PROGRESS NOTES
[facility] Urology Progress Note    Nelson Wallace is a 60 y.o. male who presents for fu of BPH/elevated psa    11/3/17:  terminal intermittency, worse in the morning, and need to press on his perineum to get the last few drops of urine out so he does not have postvoid dribbling/leaking  He also noted early morning hesitancy and intermittency of stream, and nocturia ×2, as well as early morning frequency with about 3 morning voids before starting his work day.  DTF 10x  He did also note urinary urgency and was able to pinpoint specific incidences of severe urgency. No UUI. coffee, tea, ++ spicy foods.    AUA SS: 10/2 (3: weak stream; 2: frequency, intermittency). PVR 62cc. FRAN 35-40g benign.   Concerned for past MH but on review UA had trc blood 4/2017 though UA Micro had 0-2 rbc, and UA 7/2016 negative. ASA (but off effient) for cardiac stenting 2 years prior.   psa 3.3 4/6/17, Cr 0.91 - psa 2.8 in 2013 - No family history of prostate cancer.   Started flomax, discussed conservative measures for OAB, and advised recheck of psa free/total given age specific elevation.     2/2/18: urgency has significantly improved with eliminating most bladder irritants, and notes is worse with tea. Made effort at timed voiding  On Flomax feels that he is improved 70% on a good day, and on a bad day has improved 50% from last visit. Take in AM as helps through day, though NTF down to 1x and flow improved at night.  Occasional constipation, notes that he has a good bowel movement he will urinate better. 12/9/17: PSA 3.2 (24.7% free), Cr 0.86, eGFR 92  AUA SS: 7/2 - medications helped 7 out of 10 - 2: Weak stream; 1: Emptying, frequency, intermittency, urgency, nocturia. PVR by bladder scan: 97cc.   Advised doubling flomax, though returned to 0.4mg dose 2/2 dizziness and reeval of PVR on 4/6/18 was 32cc     8/3/18: Ok with one flomax. Still has exacerbation of LUTS with tea and orange drink. Losartan at night, and has nocturia 2x/night.  Since switching losartan back to am gets up 1x/night - max 2  Voids before bed, No fluids before bed, Not much double voiding, Some urine trapping/pv dribble. Udip with trc protein, 250 glucose, 34cc pvr by bladder scan  PSA 4.0 (22.8% free) at [facility] 18: AUA symptom score:  14/3, mixed (3:  Frequency, weak stream; 2:  Emptying, intermittency, urgency; 1:  Straining, sleeping) Flomax 0.4 mg helped 5/10.  He is experiencing retrograde ejaculation which is bothersome on Flomax 0.4 mg.  He only took 2 doses of 0.8 mg due to dizziness and exacerbation of side effect profile  He is stopping fluid intake 2 hr before bed.  Every 1-2 hours he can feel an urge to urinate.  He knows he is not drinking enough water.  His stream is a bit slow.  He does weight around at the end of urinating to make sure he voids to completion.  Some days are okay sometimes has Q 30 min frequency  psa 4.2 (26% free)    He returns today notin20 psa 4.0 (23.8% free);  10/2/20 psa 4.4 (24.5% free)  AUA SS: 16/3, mixed (3:  Emptying, frequency, intermittency; 2:  Weak stream, straining, sleeping; 1:  Urgency)  Urinalysis dipstick is negative.  Postvoid residual by bladder scan is 58 cc  Still if he drinks coffee urinary urgency and frequency will increase significantly.  If he is holding or postponing his urination, once he does finally urinate frequency will immediately increased after as if there is a postobstructive diuresis  Evenings are okay until he sleeps.  Will have nocturia 1-2 times depending on his bedtime and wake up with urge to void.  With urge to void he starts to feel more urinary hesitancy.  He tries not to strain with the more full he is in the more urgent visit night the more hesitancy he has and more time it takes to get started  He has a slow spurting intermittent stream  LUTS were exacerbated significantly when he missed Flomax for 2-3 days  No retrograde ejaculation at 0.4 mg dose but ED is worsening  If  "he takes Flomax b.i.d. he will have retrograde ejaculation of mild dizziness  He does have some baseline constipation for which he takes Metamucil at times and has been trying to increase fiber    ROS: A comprehensive 10 system review was performed and is negative except as noted above in HPI    PHYSICAL EXAM:    Vitals:    10/09/20 1224   BP: 123/80   Pulse: (!) 51     Body mass index is 24.04 kg/m². Weight: 76 kg (167 lb 8.8 oz) Height: 5' 10" (177.8 cm)       General: Alert, cooperative, no distress, appears stated age   Head: Normocephalic, without obvious abnormality, atraumatic   Eyes: PERRL, conjunctiva/corneas clear   Lungs: Respirations unlabored   Heart: Warm and well perfused   Abdomen: soft NT ND  FRAN: 45-50g smooth nonnodular  Extremities: Extremities normal, atraumatic, no cyanosis or edema   Skin: Skin color, texture, turgor normal, no rashes or lesions   Psych: Appropriate   Neurologic: Non-focal       Recent Results (from the past 336 hour(s))   POCT Bladder Scan    Collection Time: 10/09/20 12:29 PM   Result Value Ref Range    POC Residual Urine Volume 58 0 - 100 mL   POCT URINE DIPSTICK WITHOUT MICROSCOPE    Collection Time: 10/09/20 12:36 PM   Result Value Ref Range    Color, UA Yellow     Spec Grav UA 1.020     pH, UA 5     WBC, UA neg     Nitrite, UA neg     Protein neg     Glucose, UA neg     Ketones, UA neg     Urobilinogen, UA 0.2     Bilirubin neg     Blood, UA neg     Clarity, UA Clear        ASSESSMENT   1. BPH with obstruction/lower urinary tract symptoms     2. Elevated PSA  POCT URINE DIPSTICK WITHOUT MICROSCOPE    POCT Bladder Scan    MRI Prostate W W/O Contrast    Creatinine, Serum       Plan    As far as management of his BPH/LUTS, he still has moderate symptoms despite alpha-blocker, and he is unable to increase the dose of his alpha blocker due to side effect profile if he does.  We did discuss lower tract evaluation with cystoscopy w/wo transrectal ultrasound to help evaluate " level of obstruction and guide further recommendations.  He has previously been interested in minimally invasive interventions, and remains so as he would like to have better control of his LUTS in the absence of medical therapy.  He would like to avoid side effect profile of TURP if possible.  Interested in Urolift.    We did also discuss his PSA trend and rising PSA velocity, though his elevated PSA has been a borderline stable elevation over the last 1-2 years.  His free PSA has been relatively normal in reasonable and he does have significant prostatic enlargement on digital rectal exam with an estimated volume of 45-50 g on digital rectal exam.  He is relatively averse to prostate biopsy and we did discuss this procedure in gross detail again.  Based on his stable PSA, with likely normal PSA density, it is most likely that his PSA represents enlarged prostate, though cannot rule out malignancy without pathology and tissue.  We discussed other means of evaluation, none of which was our as definitive, but can help provide information, aligned with previous discussions such as urine testing and MRI.    Best evaluation, most definitive, and most conservative would be to perform cystoscopy at time of prostate biopsy.  After extensive discussion of management options for both his elevated PSA and his BPH/LUTS at this time he would like to proceed with MRI of prostate, followed by cystoscopic evaluation of lower tract.  Procedure in detail diagnostic flexible cystourethroscopy was described in detail to the patient and all questions answered.  Will chart check MRI, and if there are no concerning lesions, and only typical findings of BPH nodules or PI-rad 2 findings, will proceed with cystoscopic evaluation and BPH management in closely follow his PSA in the future.  Transrectal ultrasound can be deferred as there will be volumetric measurement from MRI.   If MRI has any concerning lesions, would recommend cystoscopy  and prostate biopsy.    Cystoscopy scheduled in clinic on Friday afternoon 11/13/20 at his request.  MRI to be done previous    40 min spent in encounter, over half in counseling.  All questions patient had were answered in detail and he is agreeable to treatment plan at this time

## 2020-10-12 NOTE — PATIENT INSTRUCTIONS
Discussed conservative measures to control urgency and frequency including   1. Avoiding/minimizing bladder irritants (see below)  And increasing water    2. timed voiding - empty on a schedule (approx ~2-3 hours) in spite of need, to get ahead of urge    3. dont postponing voiding - dont hold it on purpose     4. bowel regimen as distended bowel has extrinsic compressive effect on bladder.   - any or all of the following in any combination, titrate to soft daily bowel movement without pushing or straining  - colace/stool softener capsule - once to twice daily  - miralax - 1 capful daily to start, can increase to 2x daily (or decrease to 1/2 cap daily)  - increase dietary fibery  - dietary fiber inc  - fiber supplements, such as metamucil  - prunes/prune juice    5. Stop fluids 2hr before bed, urinate before bed    Discussed bladder irritants include coffe (even decaf), tea, alcohol, soda, spicy foods, acidic juices (orange, tomato), vinegar, and artificial sweeteners/sugary beverages.    
LALI Feldman

## 2020-10-30 ENCOUNTER — TELEPHONE (OUTPATIENT)
Dept: UROLOGY | Facility: CLINIC | Age: 60
End: 2020-10-30

## 2020-10-30 ENCOUNTER — HOSPITAL ENCOUNTER (OUTPATIENT)
Dept: RADIOLOGY | Facility: HOSPITAL | Age: 60
Discharge: HOME | End: 2020-10-30
Attending: UROLOGY
Payer: COMMERCIAL

## 2020-10-30 DIAGNOSIS — R97.20 ELEVATED PSA: ICD-10-CM

## 2020-10-30 PROCEDURE — 25500020 PHARM REV CODE 255: Performed by: UROLOGY

## 2020-10-30 PROCEDURE — 72197 MRI PELVIS W/O & W/DYE: CPT | Mod: 26,,, | Performed by: RADIOLOGY

## 2020-10-30 PROCEDURE — 72197 MRI PROSTATE W W/O CONTRAST: ICD-10-PCS | Mod: 26,,, | Performed by: RADIOLOGY

## 2020-10-30 PROCEDURE — 72197 MRI PELVIS W/O & W/DYE: CPT | Mod: TC

## 2020-10-30 PROCEDURE — A9585 GADOBUTROL INJECTION: HCPCS | Performed by: UROLOGY

## 2020-10-30 RX ORDER — GADOBUTROL 604.72 MG/ML
7 INJECTION INTRAVENOUS
Status: COMPLETED | OUTPATIENT
Start: 2020-10-30 | End: 2020-10-30

## 2020-10-30 RX ADMIN — GADOBUTROL 7 ML: 604.72 INJECTION INTRAVENOUS at 04:10

## 2020-10-30 NOTE — TELEPHONE ENCOUNTER
----- Message from Veronica Abebe sent at 10/30/2020 11:25 AM CDT -----  Regarding: PA not requred per [employer]  Type: Needs Medical Advice  Who Called:  Reza HOGAN    Best Call Back Number: call back  [phone]  Additional Information: The caller is calling in regards the patient is covered under a plan that does not require PA from [employer] must contact health plan for further assistance( the Pelvic MRI)

## 2020-11-02 LAB
CREAT SERPL-MCNC: 0.9 MG/DL (ref 0.5–1.4)
SAMPLE: NORMAL

## 2020-11-03 ENCOUNTER — TELEPHONE (OUTPATIENT)
Dept: UROLOGY | Facility: CLINIC | Age: 60
End: 2020-11-03

## 2020-11-03 NOTE — TELEPHONE ENCOUNTER
Spoke w pts wife voiced pt is canceling procedure voiced would like to have biopsy done first based on MRI results and pt is to touch bases with you regarding biopsy to schedule

## 2020-11-03 NOTE — TELEPHONE ENCOUNTER
----- Message from Wood Parker sent at 11/3/2020  9:34 AM CST -----  Type: Needs Medical Advice  Who Called:  Didi Wallace (Spouse)    Best Call Back Number: [phone]  Additional Information: Caller states that she would like a callback regarding canceling the patient's procedure on 11/13

## 2020-11-05 RX ORDER — CIPROFLOXACIN 500 MG/1
500 TABLET ORAL 2 TIMES DAILY
Qty: 6 TABLET | Refills: 0 | Status: SHIPPED | OUTPATIENT
Start: 2020-11-05 | End: 2021-03-12 | Stop reason: ALTCHOICE

## 2020-11-05 NOTE — TELEPHONE ENCOUNTER
Spoke to patient  Will proceed with cystoscopy and prostate biopsy locally after reviewing findings on MRI and [place] [facility] [place]  [place]ancelled [place]sto in clinic on 11/13 and discussed times for cysto/prostate biopsy combined procedure  Will likely choose a Thursday afternoon, and will notify of date he prefers. Understands can be ASC or clinic (and if asc will need covid screening)     I went over the details of a transrectal ultrasound-guided biopsy of the prostate, and described the technique in detail.   The patient will be given local injection anesthetic to block the prostate so as to minimize any pain. 12-14 biopsy specimens will be taken. These will be sent for histopathology analysis.   Complications include bleeding, fever and chills. He was also instructed to watch for any signs of fever. If he does have any fever or chills after, he was advised to come to the emergency room right away for intravenous antibiotics and possible admission to the hospital. He is to refrain from any strenuous activity including sexual activity for the next 72 hours after biopsy. He was also advised that he may have blood while urinating, during bowel movements as well as during ejaculations. He was given a prebiopsy/postbiopsy instruction sheet was reminding him to avoid aspirin and blood thinners for 7 days prior, take the Rxed antibiotics the day before, day of, day after biopsy, and perform a fleet enema at home morning of biopsy. All questions he had were answered in detail.     cipro Rxed  Will send biopsy instructions via [employer] as discussed via phone

## 2020-11-09 DIAGNOSIS — R97.20 ELEVATED PSA: Primary | ICD-10-CM

## 2020-11-09 RX ORDER — LIDOCAINE HYDROCHLORIDE 10 MG/ML
10 INJECTION, SOLUTION EPIDURAL; INFILTRATION; INTRACAUDAL; PERINEURAL ONCE
Status: CANCELLED | OUTPATIENT
Start: 2020-11-09 | End: 2020-11-09

## 2020-11-09 RX ORDER — GENTAMICIN SULFATE 40 MG/ML
80 INJECTION, SOLUTION INTRAMUSCULAR; INTRAVENOUS
Status: CANCELLED | OUTPATIENT
Start: 2020-11-09

## 2020-11-09 RX ORDER — LIDOCAINE HYDROCHLORIDE 20 MG/ML
JELLY TOPICAL ONCE
Status: CANCELLED | OUTPATIENT
Start: 2020-11-09 | End: 2020-11-09

## 2020-11-10 NOTE — TELEPHONE ENCOUNTER
Booked cysto/prosate biopsy at [facility] on 11/24  Set covid 3d prior  Advised ASC last case  Abx rxed, instructions emailed.  Will set f/u per availability once pathology resulted

## 2020-11-21 ENCOUNTER — LAB VISIT (OUTPATIENT)
Dept: PRIMARY CARE CLINIC | Facility: CLINIC | Age: 60
End: 2020-11-21
Payer: COMMERCIAL

## 2020-11-21 DIAGNOSIS — R97.20 ELEVATED PSA: ICD-10-CM

## 2020-11-21 PROCEDURE — U0003 INFECTIOUS AGENT DETECTION BY NUCLEIC ACID (DNA OR RNA); SEVERE ACUTE RESPIRATORY SYNDROME CORONAVIRUS 2 (SARS-COV-2) (CORONAVIRUS DISEASE [COVID-19]), AMPLIFIED PROBE TECHNIQUE, MAKING USE OF HIGH THROUGHPUT TECHNOLOGIES AS DESCRIBED BY CMS-2020-01-R: HCPCS

## 2020-11-22 LAB — SARS-COV-2 RNA RESP QL NAA+PROBE: NOT DETECTED

## 2020-11-24 ENCOUNTER — HOSPITAL ENCOUNTER (OUTPATIENT)
Facility: AMBULARY SURGERY CENTER | Age: 60
Discharge: HOME | End: 2020-11-24
Attending: UROLOGY | Admitting: UROLOGY
Payer: COMMERCIAL

## 2020-11-24 DIAGNOSIS — R97.20 ELEVATED PSA: ICD-10-CM

## 2020-11-24 PROCEDURE — 55700 PR BIOPSY OF PROSTATE,NEEDLE/PUNCH: ICD-10-PCS | Mod: ,,, | Performed by: UROLOGY

## 2020-11-24 PROCEDURE — 55700 PR BIOPSY OF PROSTATE,NEEDLE/PUNCH: CPT | Mod: ,,, | Performed by: UROLOGY

## 2020-11-24 PROCEDURE — 88341 PR IHC OR ICC EACH ADD'L SINGLE ANTIBODY  STAINPR: ICD-10-PCS | Mod: 26,,, | Performed by: PATHOLOGY

## 2020-11-24 PROCEDURE — 76872 PR US TRANSRECTAL: ICD-10-PCS | Mod: 26,,, | Performed by: UROLOGY

## 2020-11-24 PROCEDURE — 88305 TISSUE EXAM BY PATHOLOGIST: CPT | Performed by: PATHOLOGY

## 2020-11-24 PROCEDURE — 88342 IMHCHEM/IMCYTCHM 1ST ANTB: CPT | Mod: 59 | Performed by: PATHOLOGY

## 2020-11-24 PROCEDURE — 88341 IMHCHEM/IMCYTCHM EA ADD ANTB: CPT | Mod: 59 | Performed by: PATHOLOGY

## 2020-11-24 PROCEDURE — 76872 US TRANSRECTAL: CPT | Mod: 26,,, | Performed by: UROLOGY

## 2020-11-24 PROCEDURE — 88342 IMHCHEM/IMCYTCHM 1ST ANTB: CPT | Mod: 26,,, | Performed by: PATHOLOGY

## 2020-11-24 PROCEDURE — 88305 TISSUE EXAM BY PATHOLOGIST: CPT | Mod: 26,,, | Performed by: PATHOLOGY

## 2020-11-24 PROCEDURE — 88305 TISSUE EXAM BY PATHOLOGIST: ICD-10-PCS | Mod: 26,,, | Performed by: PATHOLOGY

## 2020-11-24 PROCEDURE — 88341 IMHCHEM/IMCYTCHM EA ADD ANTB: CPT | Mod: 26,,, | Performed by: PATHOLOGY

## 2020-11-24 PROCEDURE — 88342 CHG IMMUNOCYTOCHEMISTRY: ICD-10-PCS | Mod: 26,,, | Performed by: PATHOLOGY

## 2020-11-24 PROCEDURE — 76872 US TRANSRECTAL: CPT | Performed by: UROLOGY

## 2020-11-24 PROCEDURE — 52000 CYSTOURETHROSCOPY: CPT | Performed by: UROLOGY

## 2020-11-24 PROCEDURE — 55700 HC PROSTATE NEEDLE BIOPSY: CPT | Performed by: UROLOGY

## 2020-11-24 RX ORDER — WATER 1 ML/ML
IRRIGANT IRRIGATION
Status: DISCONTINUED | OUTPATIENT
Start: 2020-11-24 | End: 2020-11-24 | Stop reason: HOSPADM

## 2020-11-24 RX ORDER — LIDOCAINE HYDROCHLORIDE 20 MG/ML
JELLY TOPICAL
Status: DISCONTINUED | OUTPATIENT
Start: 2020-11-24 | End: 2020-11-24 | Stop reason: HOSPADM

## 2020-11-24 RX ORDER — GENTAMICIN SULFATE 40 MG/ML
80 INJECTION, SOLUTION INTRAMUSCULAR; INTRAVENOUS
Status: DISCONTINUED | OUTPATIENT
Start: 2020-11-24 | End: 2020-11-24 | Stop reason: HOSPADM

## 2020-11-24 RX ORDER — LIDOCAINE HYDROCHLORIDE 10 MG/ML
INJECTION, SOLUTION EPIDURAL; INFILTRATION; INTRACAUDAL; PERINEURAL
Status: DISCONTINUED
Start: 2020-11-24 | End: 2020-11-24 | Stop reason: HOSPADM

## 2020-11-24 RX ORDER — LIDOCAINE HYDROCHLORIDE 10 MG/ML
INJECTION INFILTRATION; PERINEURAL
Status: DISCONTINUED | OUTPATIENT
Start: 2020-11-24 | End: 2020-11-24 | Stop reason: HOSPADM

## 2020-11-24 RX ADMIN — GENTAMICIN SULFATE 80 MG: 40 INJECTION, SOLUTION INTRAMUSCULAR; INTRAVENOUS at 03:11

## 2020-11-24 NOTE — H&P
[facility] Urology Progress Note     Nelson Wallace is a 60 y.o. male who presents for fu of BPH/elevated psa     11/3/17:  terminal intermittency, worse in the morning, and need to press on his perineum to get the last few drops of urine out so he does not have postvoid dribbling/leaking  He also noted early morning hesitancy and intermittency of stream, and nocturia ×2, as well as early morning frequency with about 3 morning voids before starting his work day.  DTF 10x  He did also note urinary urgency and was able to pinpoint specific incidences of severe urgency. No UUI. coffee, tea, ++ spicy foods.    AUA SS: 10/2 (3: weak stream; 2: frequency, intermittency). PVR 62cc. FRAN 35-40g benign.   Concerned for past MH but on review UA had trc blood 4/2017 though UA Micro had 0-2 rbc, and UA 7/2016 negative. ASA (but off effient) for cardiac stenting 2 years prior.   psa 3.3 4/6/17, Cr 0.91 - psa 2.8 in 2013 - No family history of prostate cancer.   Started flomax, discussed conservative measures for OAB, and advised recheck of psa free/total given age specific elevation.     2/2/18: urgency has significantly improved with eliminating most bladder irritants, and notes is worse with tea. Made effort at timed voiding  On Flomax feels that he is improved 70% on a good day, and on a bad day has improved 50% from last visit. Take in AM as helps through day, though NTF down to 1x and flow improved at night.  Occasional constipation, notes that he has a good bowel movement he will urinate better. 12/9/17: PSA 3.2 (24.7% free), Cr 0.86, eGFR 92  AUA SS: 7/2 - medications helped 7 out of 10 - 2: Weak stream; 1: Emptying, frequency, intermittency, urgency, nocturia. PVR by bladder scan: 97cc.   Advised doubling flomax, though returned to 0.4mg dose 2/2 dizziness and reeval of PVR on 4/6/18 was 32cc     8/3/18: Ok with one flomax. Still has exacerbation of LUTS with tea and orange drink. Losartan at night, and has  nocturia 2x/night. Since switching losartan back to am gets up 1x/night - max 2  Voids before bed, No fluids before bed, Not much double voiding, Some urine trapping/pv dribble. Udip with trc protein, 250 glucose, 34cc pvr by bladder scan  PSA 4.0 (22.8% free) at [facility] 18: AUA symptom score:  14/3, mixed (3:  Frequency, weak stream; 2:  Emptying, intermittency, urgency; 1:  Straining, sleeping) Flomax 0.4 mg helped 5/10.  He is experiencing retrograde ejaculation which is bothersome on Flomax 0.4 mg.  He only took 2 doses of 0.8 mg due to dizziness and exacerbation of side effect profile  He is stopping fluid intake 2 hr before bed.  Every 1-2 hours he can feel an urge to urinate.  He knows he is not drinking enough water.  His stream is a bit slow.  He does weight around at the end of urinating to make sure he voids to completion.  Some days are okay sometimes has Q 30 min frequency  psa 4.2 (26% free)     He returns today notin20 psa 4.0 (23.8% free);  10/2/20 psa 4.4 (24.5% free)  AUA SS: 16/3, mixed (3:  Emptying, frequency, intermittency; 2:  Weak stream, straining, sleeping; 1:  Urgency)  Urinalysis dipstick is negative.  Postvoid residual by bladder scan is 58 cc  Still if he drinks coffee urinary urgency and frequency will increase significantly.  If he is holding or postponing his urination, once he does finally urinate frequency will immediately increased after as if there is a postobstructive diuresis  Evenings are okay until he sleeps.  Will have nocturia 1-2 times depending on his bedtime and wake up with urge to void.  With urge to void he starts to feel more urinary hesitancy.  He tries not to strain with the more full he is in the more urgent visit night the more hesitancy he has and more time it takes to get started  He has a slow spurting intermittent stream  LUTS were exacerbated significantly when he missed Flomax for 2-3 days  No retrograde ejaculation at 0.4 mg dose but  "ED is worsening  If he takes Flomax b.i.d. he will have retrograde ejaculation of mild dizziness  He does have some baseline constipation for which he takes Metamucil at times and has been trying to increase fiber     ROS: A comprehensive 10 system review was performed and is negative except as noted above in HPI     PHYSICAL EXAM:         Vitals:     10/09/20 1224   BP: 123/80   Pulse: (!) 51      Body mass index is 24.04 kg/m². Weight: 76 kg (167 lb 8.8 oz) Height: 5' 10" (177.8 cm)         General: Alert, cooperative, no distress, appears stated age   Head: Normocephalic, without obvious abnormality, atraumatic   Eyes: PERRL, conjunctiva/corneas clear   Lungs: Respirations unlabored   Heart: Warm and well perfused   Abdomen: soft NT ND  FRAN: 45-50g smooth nonnodular  Extremities: Extremities normal, atraumatic, no cyanosis or edema   Skin: Skin color, texture, turgor normal, no rashes or lesions   Psych: Appropriate   Neurologic: Non-focal         Recent Results         Recent Results (from the past 336 hour(s))   POCT Bladder Scan     Collection Time: 10/09/20 12:29 PM   Result Value Ref Range     POC Residual Urine Volume 58 0 - 100 mL   POCT URINE DIPSTICK WITHOUT MICROSCOPE     Collection Time: 10/09/20 12:36 PM   Result Value Ref Range     Color, UA Yellow       Spec Grav UA 1.020       pH, UA 5       WBC, UA neg       Nitrite, UA neg       Protein neg       Glucose, UA neg       Ketones, UA neg       Urobilinogen, UA 0.2       Bilirubin neg       Blood, UA neg       Clarity, UA Clear             ASSESSMENT   1. BPH with obstruction/lower urinary tract symptoms      2. Elevated PSA  POCT URINE DIPSTICK WITHOUT MICROSCOPE     POCT Bladder Scan     MRI Prostate W W/O Contrast     Creatinine, Serum         Plan    As far as management of his BPH/LUTS, he still has moderate symptoms despite alpha-blocker, and he is unable to increase the dose of his alpha blocker due to side effect profile if he does.  We did " discuss lower tract evaluation with cystoscopy w/wo transrectal ultrasound to help evaluate level of obstruction and guide further recommendations.  He has previously been interested in minimally invasive interventions, and remains so as he would like to have better control of his LUTS in the absence of medical therapy.  He would like to avoid side effect profile of TURP if possible.  Interested in Urolift.     We did also discuss his PSA trend and rising PSA velocity, though his elevated PSA has been a borderline stable elevation over the last 1-2 years.  His free PSA has been relatively normal in reasonable and he does have significant prostatic enlargement on digital rectal exam with an estimated volume of 45-50 g on digital rectal exam.  He is relatively averse to prostate biopsy and we did discuss this procedure in gross detail again.  Based on his stable PSA, with likely normal PSA density, it is most likely that his PSA represents enlarged prostate, though cannot rule out malignancy without pathology and tissue.  We discussed other means of evaluation, none of which was our as definitive, but can help provide information, aligned with previous discussions such as urine testing and MRI.     Best evaluation, most definitive, and most conservative would be to perform cystoscopy at time of prostate biopsy.  After extensive discussion of management options for both his elevated PSA and his BPH/LUTS at this time he would like to proceed with MRI of prostate, followed by cystoscopic evaluation of lower tract.  Procedure in detail diagnostic flexible cystourethroscopy was described in detail to the patient and all questions answered.  Will chart check MRI, and if there are no concerning lesions, and only typical findings of BPH nodules or PI-rad 2 findings, will proceed with cystoscopic evaluation and BPH management in closely follow his PSA in the future.  Transrectal ultrasound can be deferred as there will be  volumetric measurement from MRI.   If MRI has any concerning lesions, would recommend cystoscopy and prostate biopsy.     Cystoscopy scheduled in clinic on Friday afternoon 11/13/20 at his request.  MRI to be done previous     1. PIRADS 4.  Suspicious lesion in the right peripheral zone.  Clinically significant cancer is likely to be present.  2. Benign prostatic hypertrophy.    Prostate: Volume is 52cc.   Peripheral zone: Mild diffuse heterogeneity.  Focal lesion medial peripheral zone just to the right of midline.  Lesion (DOUG) #axial series 7 images 12-14  Location: Side: Right region: Mid zone: posterior peripheral zone medially  Greatest dimension: 0.5cm    Spoke to patient  Will proceed with cystoscopy and prostate biopsy locally after reviewing findings on MRI and discussing biopsy with cognitive fusion vs mri fusion at [facility] [place] [place]  Cancelled cysto in clinic on 11/13 and discussed times for cysto/prostate biopsy combined procedure  Will likely choose a Thursday afternoon, and will notify of date he prefers. Understands can be ASC or clinic (and if asc will need covid screening)     I went over the details of a transrectal ultrasound-guided biopsy of the prostate, and described the technique in detail.   The patient will be given local injection anesthetic to block the prostate so as to minimize any pain. 12-14 biopsy specimens will be taken. These will be sent for histopathology analysis.   Complications include bleeding, fever and chills. He was also instructed to watch for any signs of fever. If he does have any fever or chills after, he was advised to come to the emergency room right away for intravenous antibiotics and possible admission to the hospital. He is to refrain from any strenuous activity including sexual activity for the next 72 hours after biopsy. He was also advised that he may have blood while urinating, during bowel movements as well as during ejaculations. He was given a prebiopsy/postbiopsy  instruction sheet was reminding him to avoid aspirin and blood thinners for 7 days prior, take the Rxed antibiotics the day before, day of, day after biopsy, and perform a fleet enema at home morning of biopsy. All questions he had were answered in detail.        Booked cysto/prosate biopsy at [facility] on 11/24  Set covid 3d prior  Advised [facility] last case  Abx rxed, instructions emailed.  Will set f/u per availability once pathology resulted    Acceptable for asc

## 2020-11-24 NOTE — PLAN OF CARE
Stable, states ready to go home, mahesh po fluids, denies pain, voided ambulated to car with RN and wife

## 2020-11-25 VITALS
DIASTOLIC BLOOD PRESSURE: 73 MMHG | BODY MASS INDEX: 24.04 KG/M2 | TEMPERATURE: 98 F | WEIGHT: 167.56 LBS | HEART RATE: 60 BPM | RESPIRATION RATE: 20 BRPM | OXYGEN SATURATION: 100 % | SYSTOLIC BLOOD PRESSURE: 135 MMHG

## 2020-11-25 NOTE — OP NOTE
[facility] Urology Op/Brief DC Note     Date: 11/24/2020     Staff Surgeon: Gilmer Daly MD     Pre-Op Diagnosis:   Elevated psa   BPH/LUTS      Post-Op Diagnosis: same     Procedure(s) Performed:   1. Transrectal ultrasound guided prostate needle biopsy  2. Cystoscopy (flexible)     INDICATION FOR PROCEDURE:   61 yo M with interval psa rise from 3.2 to 4 from 2017 to 2018, then has been a relatively stable elevation in 4 range with reasonable psa since, with progressive LUTS despite increasing dose of flomax and most recently had AUA SS 16/3 despite meds with psa 4.4 (25% free). Prostate MRI however demonstrated 0.5cm PIRAD4 lesion at right mid gland peripheral zone.      ANESTHESIA: Local periprostatic block; 10 cc 1% lidocaine      PSA: 4.4 (25% free)      TRUS VOLUME:  61cc (H 35.8; W 56.2; L 57.9 mm)     EBL: Minimal     SPECIMEN:   17 core prostate biopsy - right and left base, middle,9apex - medial and lateral of each, and bilateral transition zone 1 core each  As well as 2 cores at right mid posterior (site of MRI lesion) and 1 core left mid posterior     ULTRASOUND FINDINGS:  no median lobe, minimal scattered hypoechoities, discrete round lesion seen right lateral mid gland medially correlating with MRI so targeted on biopsy      CYSTO FINDINGS:    significant lateral lobe obstruction, especially distally, and asymmetric R>L proximally, with mild intravesical extension without median lobe, and mild diffuse trabeculations      CONFIRMED PATIENT TOOK ANTIBIOTICS: Yes     CONFIRMED PATIENT NOT TAKING ASPIRIN OR ANTICOAGULANTS: Yes     CONFIRMED PATIENT USED ENEMA: Yes     PROCEDURE IN DETAIL:  After informed consent, the patient was prepped and drapped in standard  cystoscopic fashion and 2% xylocaine jelly was instilled into the urethra. 80mg gentamicin injected IM.     Then a a flexible cystoscope was passed into the bladder via the urethra.   Anterior urethra appeared normal without any lesions or  narrowings. The prostatic urethra demonstrated obstructive findings as above     Bladder otherwise systematically inspected and no mucosal lesions or tumors. Mild trabeculations.     Patient voided into urinal, and was then turned to the left lateral position and TRUS probe inserted into rectum. Ultrasound measurements taken as above and ultrasound of prostate performed with findings as above. Noted to empty bladder well based on US. Approximately 10cc of 1% lidocaine injected bilaterally in petty-prostatic block fashion, as well as at the apex.   14 total core biopies taken were taken in a sextant fashion with inclusion of transition zones, additional 3 cores targeting MRI lesion, 2 directly right of midline, and 1 adjacent on left.  Patient tolerated well without complication.        DISHARGE:  Status post uncomplicated outpatient procedure as above.   Disposition: Home.  He will follow up in 2 weeks for biopsy results.   Resume regular diet  FU 2 weeks for biopsy results and further management discussion  Return to ER if temp >101, uncontrollable urethral or rectal bleeding, or inability to urinate/urinary retention  No sex/ejaculation x3 days, no riding mowers/tractors/bikes x2-4 weeks  Drink plenty of water may see blood    Briefly discussed outlet procedures if biopsy negative, and interested in urolift. Discussion TBD pending pathology

## 2020-12-04 LAB
FINAL PATHOLOGIC DIAGNOSIS: NORMAL
GROSS: NORMAL
Lab: NORMAL
MICROSCOPIC EXAM: NORMAL

## 2020-12-07 DIAGNOSIS — R30.0 DYSURIA: Primary | ICD-10-CM

## 2020-12-08 ENCOUNTER — LAB VISIT (OUTPATIENT)
Dept: LAB | Facility: HOSPITAL | Age: 60
End: 2020-12-08
Attending: UROLOGY
Payer: COMMERCIAL

## 2020-12-08 DIAGNOSIS — R30.0 DYSURIA: ICD-10-CM

## 2020-12-08 PROCEDURE — 87086 URINE CULTURE/COLONY COUNT: CPT

## 2020-12-11 ENCOUNTER — OFFICE VISIT (OUTPATIENT)
Dept: UROLOGY | Facility: CLINIC | Age: 60
End: 2020-12-11
Payer: COMMERCIAL

## 2020-12-11 VITALS
HEART RATE: 58 BPM | HEIGHT: 70 IN | BODY MASS INDEX: 23.68 KG/M2 | SYSTOLIC BLOOD PRESSURE: 125 MMHG | DIASTOLIC BLOOD PRESSURE: 76 MMHG | WEIGHT: 165.38 LBS

## 2020-12-11 DIAGNOSIS — N40.1 BPH WITH OBSTRUCTION/LOWER URINARY TRACT SYMPTOMS: ICD-10-CM

## 2020-12-11 DIAGNOSIS — N13.8 BPH WITH OBSTRUCTION/LOWER URINARY TRACT SYMPTOMS: ICD-10-CM

## 2020-12-11 DIAGNOSIS — C61 PROSTATE CANCER: Primary | ICD-10-CM

## 2020-12-11 LAB
BACTERIA UR CULT: NO GROWTH
BILIRUB SERPL-MCNC: ABNORMAL MG/DL
BLOOD URINE, POC: ABNORMAL
CLARITY, POC UA: CLEAR
COLOR, POC UA: YELLOW
GLUCOSE UR QL STRIP: 250
KETONES UR QL STRIP: ABNORMAL
LEUKOCYTE ESTERASE URINE, POC: ABNORMAL
NITRITE, POC UA: ABNORMAL
PH, POC UA: 5
PROTEIN, POC: ABNORMAL
SPECIFIC GRAVITY, POC UA: 1.03
UROBILINOGEN, POC UA: 0.2

## 2020-12-11 PROCEDURE — 81002 POCT URINE DIPSTICK WITHOUT MICROSCOPE: ICD-10-PCS | Mod: S$GLB,,, | Performed by: UROLOGY

## 2020-12-11 PROCEDURE — 99354 PR PROLONGED SVC, OUPT, 1ST HR: CPT | Mod: S$GLB,,, | Performed by: UROLOGY

## 2020-12-11 PROCEDURE — 3078F DIAST BP <80 MM HG: CPT | Mod: CPTII,S$GLB,, | Performed by: UROLOGY

## 2020-12-11 PROCEDURE — 1126F AMNT PAIN NOTED NONE PRSNT: CPT | Mod: S$GLB,,, | Performed by: UROLOGY

## 2020-12-11 PROCEDURE — 3008F PR BODY MASS INDEX (BMI) DOCUMENTED: ICD-10-PCS | Mod: CPTII,S$GLB,, | Performed by: UROLOGY

## 2020-12-11 PROCEDURE — 99999 PR PBB SHADOW E&M-EST. PATIENT-LVL III: CPT | Mod: PBBFAC,,, | Performed by: UROLOGY

## 2020-12-11 PROCEDURE — 3008F BODY MASS INDEX DOCD: CPT | Mod: CPTII,S$GLB,, | Performed by: UROLOGY

## 2020-12-11 PROCEDURE — 3078F PR MOST RECENT DIASTOLIC BLOOD PRESSURE < 80 MM HG: ICD-10-PCS | Mod: CPTII,S$GLB,, | Performed by: UROLOGY

## 2020-12-11 PROCEDURE — 99999 PR PBB SHADOW E&M-EST. PATIENT-LVL III: ICD-10-PCS | Mod: PBBFAC,,, | Performed by: UROLOGY

## 2020-12-11 PROCEDURE — 1126F PR PAIN SEVERITY QUANTIFIED, NO PAIN PRESENT: ICD-10-PCS | Mod: S$GLB,,, | Performed by: UROLOGY

## 2020-12-11 PROCEDURE — 81002 URINALYSIS NONAUTO W/O SCOPE: CPT | Mod: S$GLB,,, | Performed by: UROLOGY

## 2020-12-11 PROCEDURE — 99215 OFFICE O/P EST HI 40 MIN: CPT | Mod: 25,S$GLB,, | Performed by: UROLOGY

## 2020-12-11 PROCEDURE — 3074F SYST BP LT 130 MM HG: CPT | Mod: CPTII,S$GLB,, | Performed by: UROLOGY

## 2020-12-11 PROCEDURE — 99215 PR OFFICE/OUTPT VISIT, EST, LEVL V, 40-54 MIN: ICD-10-PCS | Mod: 25,S$GLB,, | Performed by: UROLOGY

## 2020-12-11 PROCEDURE — 3074F PR MOST RECENT SYSTOLIC BLOOD PRESSURE < 130 MM HG: ICD-10-PCS | Mod: CPTII,S$GLB,, | Performed by: UROLOGY

## 2020-12-11 PROCEDURE — 99354 PR PROLONGED SVC, OUPT, 1ST HR: ICD-10-PCS | Mod: S$GLB,,, | Performed by: UROLOGY

## 2020-12-11 NOTE — PROGRESS NOTES
[facility] Urology Progress Note    Nelson Wallace is a 60 y.o. male who presents for follow up of bph and elevated psa s/p cystoscopy and prostate biopsy    11/3/17:  terminal intermittency, worse in the morning, and need to press on his perineum to get the last few drops of urine out so he does not have postvoid dribbling/leaking  He also noted early morning hesitancy and intermittency of stream, and nocturia ×2, as well as early morning frequency with about 3 morning voids before starting his work day.  DTF 10x  He did also note urinary urgency and was able to pinpoint specific incidences of severe urgency. No UUI. coffee, tea, ++ spicy foods.    AUA SS: 10/2 (3: weak stream; 2: frequency, intermittency). PVR 62cc. FRAN 35-40g benign.   Concerned for past MH but on review UA had trc blood 4/2017 though UA Micro had 0-2 rbc, and UA 7/2016 negative. ASA (but off effient) for cardiac stenting 2 years prior.   psa 3.3 4/6/17, Cr 0.91 - psa 2.8 in 2013 - No family history of prostate cancer.   Started flomax, discussed conservative measures for OAB, and advised recheck of psa free/total given age specific elevation.     2/2/18: urgency has significantly improved with eliminating most bladder irritants, and notes is worse with tea. Made effort at timed voiding  On Flomax feels that he is improved 70% on a good day, and on a bad day has improved 50% from last visit. Take in AM as helps through day, though NTF down to 1x and flow improved at night.  Occasional constipation, notes that he has a good bowel movement he will urinate better. 12/9/17: PSA 3.2 (24.7% free), Cr 0.86, eGFR 92  AUA SS: 7/2 - medications helped 7 out of 10 - 2: Weak stream; 1: Emptying, frequency, intermittency, urgency, nocturia. PVR by bladder scan: 97cc.   Advised doubling flomax, though returned to 0.4mg dose 2/2 dizziness and reeval of PVR on 4/6/18 was 32cc     8/3/18: Ok with one flomax. Still has exacerbation of LUTS with tea and orange  drink. Losartan at night, and has nocturia 2x/night. Since switching losartan back to am gets up 1x/night - max 2  Voids before bed, No fluids before bed, Not much double voiding, Some urine trapping/pv dribble. Udip with trc protein, 250 glucose, 34cc pvr by bladder scan  PSA 4.0 (22.8% free) at [facility] 18: AUA symptom score:  14/3, mixed (3:  Frequency, weak stream; 2:  Emptying, intermittency, urgency; 1:  Straining, sleeping) Flomax 0.4 mg helped 5/10.  He is experiencing retrograde ejaculation which is bothersome on Flomax 0.4 mg.  He only took 2 doses of 0.8 mg due to dizziness and exacerbation of side effect profile  He is stopping fluid intake 2 hr before bed.  Every 1-2 hours he can feel an urge to urinate.  He knows he is not drinking enough water.  His stream is a bit slow.  He does weight around at the end of urinating to make sure he voids to completion.  Some days are okay sometimes has Q 30 min frequency  psa 4.2 (26% free)     He returns today notin20 psa 4.0 (23.8% free);  10/2/20 psa 4.4 (24.5% free)  AUA SS: 16/3, mixed (3:  Emptying, frequency, intermittency; 2:  Weak stream, straining, sleeping; 1:  Urgency)  Urinalysis dipstick is negative.  Postvoid residual by bladder scan is 58 cc  Still if he drinks coffee urinary urgency and frequency will increase significantly.  If he is holding or postponing his urination, once he does finally urinate frequency will immediately increased after as if there is a postobstructive diuresis  Evenings are okay until he sleeps.  Will have nocturia 1-2 times depending on his bedtime and wake up with urge to void.  With urge to void he starts to feel more urinary hesitancy.  He tries not to strain with the more full he is in the more urgent visit night the more hesitancy he has and more time it takes to get started  He has a slow spurting intermittent stream  LUTS were exacerbated significantly when he missed Flomax for 2-3 days  No  "retrograde ejaculation at 0.4 mg dose but ED is worsening  If he takes Flomax b.i.d. he will have retrograde ejaculation of mild dizziness  He does have some baseline constipation for which he takes Metamucil at times and has been trying to increase fiber    psa 4.4 (25% free). Prostate MRI however demonstrated 0.5cm PIRAD4 lesion at right mid gland peripheral zone.   Cysto/prostate biopsy 11/24/20:  TRUS VOLUME:  61cc (H 35.8; W 56.2; L 57.9 mm)  SPECIMEN: 17 core prostate biopsy - right and left base, middle,9apex - medial and lateral of each, and bilateral transition zone 1 core each  As well as 2 cores at right mid posterior (site of MRI lesion) and 1 core left mid posterior  ULTRASOUND FINDINGS:  no median lobe, minimal scattered hypoechoities, discrete round lesion seen right lateral mid gland medially correlating with MRI so targeted on biopsy    CYSTO:  significant lateral lobe obstruction, especially distally, and asymmetric R>L proximally, with mild intravesical extension without median lobe, and mild diffuse trabeculations  PATHOLOGY: 4/17 cores mary lou 6  3+3=6: 10% RB lateral; 8% RM med, 10% LB lateral, 3% LB medial  Multiple cores HGPIN3 (including surrounding MRI lesion at bilateral mid posterior cores), and sparse prostatitis    Did well after biopsy. Mild increase in urinary frequency. Ucx checked and negative. Some brown discharge per urethra with BM consistent with old blood in seminal fluid. Ucx neg  ntf x2  Today blood with BMs calming down  Frequency settling down  Frequency worse in evening but more fluid intake then  Continuing flomax daily and occ takes 0.8mg dose    ROS: A comprehensive 10 system review was performed and is negative except as noted above in HPI    PHYSICAL EXAM:    Vitals:    12/11/20 1054   BP: 125/76   Pulse: (!) 58     Body mass index is 23.72 kg/m². Weight: 75 kg (165 lb 5.5 oz) Height: 5' 10" (177.8 cm)       General: Alert, cooperative, no distress, appears stated age " "  Head: Normocephalic, without obvious abnormality, atraumatic   Eyes: PERRL, conjunctiva/corneas clear   Lungs: Respirations unlabored   Heart: Warm and well perfused   Abdomen: Soft NT ND   Extremities: Extremities normal, atraumatic, no cyanosis or edema   Skin: Skin color, texture, turgor normal, no rashes or lesions   Psych: Appropriate   Neurologic: Non-focal       Recent Results (from the past 336 hour(s))   Urine culture    Collection Time: 12/08/20  1:39 PM    Specimen: Urine, Clean Catch   Result Value Ref Range    Urine Culture, Routine No growth    POCT URINE DIPSTICK WITHOUT MICROSCOPE    Collection Time: 12/11/20 11:03 AM   Result Value Ref Range    Color, UA Yellow     pH, UA 5     WBC, UA neg     Nitrite, UA neg     Protein neg     Glucose,      Ketones, UA neg     Urobilinogen, UA 0.2     Bilirubin neg     Blood, UA sm     Clarity, UA Clear     Spec Grav UA 1.030        ASSESSMENT   1. Prostate cancer  POCT URINE DIPSTICK WITHOUT MICROSCOPE    PSA, Total and Free    PSA, Total and Free   2. BPH with obstruction/lower urinary tract symptoms         Plan    I sat with the patient and his wife and explained in detail his diagnosis of prostate cancer, including explanation of mary lou grading system, and went over all the treatment options for management of his prostate cancer. The treatment options include detailed discussions of surveillance, radiation treatment including external beam as well as brachytherapy, and surgical extirpative therapy namely robotic prostatectomy. We did discuss that given his pathology of low volume low risk mary lou 6 CaP that he is a candidate for active surveillance.   He actually falls into "very low risk" category, with PSA density <0.15, cT1C exam, low risk Mary Lou 6 disease, PSA less than 10, less than 1/3 of cores positive, less than 50% involvement of cores.      We reviewed that on active surveillance for prostate cancer, PSA can be followed every 6 months, " with confirmatory biopsy performed at 1 year targeting the cores that were positive previously.  If pathology is stable and he is appropriate for active surveillance to continue, will continue to get PSA every 6 months, but can alternate evaluations with MRI and biopsy every 2-3 years of PSA stays stable, re-evaluating sooner in the case of PSA rise.     Risks and benefits of radiation and surgery were also discussed in detail, and I answered many questions about various aspects of both options, of which I answered radiation related questions to the best of my ability.We discussed radical prostatectomy. The procedure in general including hospital stay and postoperative process were discussed in detail. Risks of surgery were discussed including but not limited to up-front urinary incontinence and erectile dysfunction which we will work to overcome with kegel excercises and any number of ED therapies, versus side effects of radiation which are often minimal and irritative upfront with more troubling complications such as stricture and radiation cystitis occurring late. We also briefly discussed psa monitoring post-treatment and had brief discussion about psa recurrence, noting radiation could be used as salvage therapy after surgery but not often vice versa. We discussed concurrent pelvic lymphadenectomy with surgery, and that sometimes lymph nodes are included in radiation fields dependent on risk (unlikely in his case). Also discussed concurrent use of ADT with radiation and its side effects such as fatigue, hot flashes, ED (not needed in low risk disease). I did elaborate on the urologist's involvement in radiation treatment such as starting ADT when indicated and placing fiducial markers/periprostatic spacing gel prior to starting XRT. The need for monitoring his PSA postoperatively was also discussed with the patient regardless of treatment modality selected.      Given his young age and significant prostatic  obstruction with bothersome lower urinary tract symptoms not relieved at this time with Flomax, we did also discuss that prostatectomy would not only be served to treat the prostate cancer, but also to remove prostate obstruction, and after postop recovery of stress urinary incontinence would also relieve obstruction and obstructive voiding habits, and could be taken into consideration. Did also review more mary lou 6 disease may be present given multiple cores of HGPIN, including at area of concern on mri. Reviewed this finding alone in mult cores may be indicative of 20% chance of finding CaP in future, though finding inconsequential when other malignancy identified.  Postop side effects may outweigh management of obstruction in cancer appropriate for surveillance, so  should he remain on active surveillance, we did discuss that minimally invasive BPH interventions can be indicated and are safe and appropriate on active surveillance for prostate cancer, such as Urolift, which would relieve prostate obstruction, and not interfere with future prostatectomy, as often not encountered at time of surgery as they seat within the capsule, nor would interfere with future radiation as there was service fiducial markers, nor would interfere with future surveillance as only causes a 5 mm artifact on MRI is otherwise MRI safe.  He may consider this.  Risks and benefits of procedure discussed and cystoscopic photos of prostatic obstruction reviewed. Information provided on procedure for his consideration     He has only recently been on medical therapy for his BPH component and lower urinary tract symptoms and would like to continue at this time, largely on 0.4 and occasionally 0.8mg flomax dose. Reviewed conservative measures for urgency frequency.  At this time would like to start on active surveillance, and consider further surveillance for intervention of his LUTS. Did review that if when psa elevated to 4 range, if  indicated low risk disease presence, has had stable psa Q6 mos for >1yr, and therefore feels comfortable with AS at this time.   Will plan 6 mos psa diagnostic, then again at 1 yr, and annual follow up with restaging biopsy at minimum.  In interim will decide about proceeding with any intervention for LUTS, or holding pending psa review in 6 mos  Given prostatitis on pathology, would pretreat before intervention with doxycycline, though none indicated now as his postbx expected side effects are improving and returning to baseline LUTS      100 minutes spent in face to face encounter with patient and wife, over half in counseling  All questions patient and wife answered and agreeable to treatment plan.

## 2020-12-18 ENCOUNTER — IMMUNIZATION (OUTPATIENT)
Dept: FAMILY MEDICINE | Facility: CLINIC | Age: 60
End: 2020-12-18
Payer: COMMERCIAL

## 2020-12-18 DIAGNOSIS — Z23 NEED FOR VACCINATION: ICD-10-CM

## 2020-12-18 PROCEDURE — 91300 COVID-19, MRNA, LNP-S, PF, 30 MCG/0.3 ML DOSE VACCINE: ICD-10-PCS | Mod: S$GLB,,, | Performed by: INTERNAL MEDICINE

## 2020-12-18 PROCEDURE — 0001A COVID-19, MRNA, LNP-S, PF, 30 MCG/0.3 ML DOSE VACCINE: CPT | Mod: S$GLB,,, | Performed by: INTERNAL MEDICINE

## 2020-12-18 PROCEDURE — 0001A COVID-19, MRNA, LNP-S, PF, 30 MCG/0.3 ML DOSE VACCINE: ICD-10-PCS | Mod: S$GLB,,, | Performed by: INTERNAL MEDICINE

## 2020-12-18 PROCEDURE — 91300 COVID-19, MRNA, LNP-S, PF, 30 MCG/0.3 ML DOSE VACCINE: CPT | Mod: S$GLB,,, | Performed by: INTERNAL MEDICINE

## 2021-01-08 ENCOUNTER — IMMUNIZATION (OUTPATIENT)
Dept: FAMILY MEDICINE | Facility: CLINIC | Age: 61
End: 2021-01-08
Payer: COMMERCIAL

## 2021-01-08 DIAGNOSIS — Z23 NEED FOR VACCINATION: ICD-10-CM

## 2021-01-08 PROCEDURE — 0002A COVID-19, MRNA, LNP-S, PF, 30 MCG/0.3 ML DOSE VACCINE: CPT | Mod: CV19,S$GLB,, | Performed by: INTERNAL MEDICINE

## 2021-01-08 PROCEDURE — 0002A COVID-19, MRNA, LNP-S, PF, 30 MCG/0.3 ML DOSE VACCINE: ICD-10-PCS | Mod: CV19,S$GLB,, | Performed by: INTERNAL MEDICINE

## 2021-01-08 PROCEDURE — 91300 COVID-19, MRNA, LNP-S, PF, 30 MCG/0.3 ML DOSE VACCINE: CPT | Mod: S$GLB,,, | Performed by: INTERNAL MEDICINE

## 2021-01-08 PROCEDURE — 91300 COVID-19, MRNA, LNP-S, PF, 30 MCG/0.3 ML DOSE VACCINE: ICD-10-PCS | Mod: S$GLB,,, | Performed by: INTERNAL MEDICINE

## 2021-03-12 ENCOUNTER — OFFICE VISIT (OUTPATIENT)
Dept: FAMILY MEDICINE | Facility: CLINIC | Age: 61
End: 2021-03-12
Payer: COMMERCIAL

## 2021-03-12 VITALS
OXYGEN SATURATION: 98 % | HEART RATE: 57 BPM | DIASTOLIC BLOOD PRESSURE: 62 MMHG | WEIGHT: 162 LBS | SYSTOLIC BLOOD PRESSURE: 118 MMHG | HEIGHT: 70 IN | RESPIRATION RATE: 17 BRPM | BODY MASS INDEX: 23.19 KG/M2

## 2021-03-12 DIAGNOSIS — Z00.00 WELL ADULT EXAM: Primary | ICD-10-CM

## 2021-03-12 DIAGNOSIS — R39.9 LOWER URINARY TRACT SYMPTOMS (LUTS): ICD-10-CM

## 2021-03-12 DIAGNOSIS — R06.2 WHEEZE: ICD-10-CM

## 2021-03-12 DIAGNOSIS — E03.9 HYPOTHYROIDISM, UNSPECIFIED TYPE: ICD-10-CM

## 2021-03-12 DIAGNOSIS — Z13.1 DIABETES MELLITUS SCREENING: ICD-10-CM

## 2021-03-12 DIAGNOSIS — I25.10 CORONARY ARTERY DISEASE, ANGINA PRESENCE UNSPECIFIED, UNSPECIFIED VESSEL OR LESION TYPE, UNSPECIFIED WHETHER NATIVE OR TRANSPLANTED HEART: ICD-10-CM

## 2021-03-12 DIAGNOSIS — E78.01 FAMILIAL HYPERCHOLESTEROLEMIA: ICD-10-CM

## 2021-03-12 PROCEDURE — 3074F SYST BP LT 130 MM HG: CPT | Mod: S$GLB,,, | Performed by: FAMILY MEDICINE

## 2021-03-12 PROCEDURE — 1126F AMNT PAIN NOTED NONE PRSNT: CPT | Mod: S$GLB,,, | Performed by: FAMILY MEDICINE

## 2021-03-12 PROCEDURE — 3008F BODY MASS INDEX DOCD: CPT | Mod: S$GLB,,, | Performed by: FAMILY MEDICINE

## 2021-03-12 PROCEDURE — 99396 PR PREVENTIVE VISIT,EST,40-64: ICD-10-PCS | Mod: S$GLB,,, | Performed by: FAMILY MEDICINE

## 2021-03-12 PROCEDURE — 3074F PR MOST RECENT SYSTOLIC BLOOD PRESSURE < 130 MM HG: ICD-10-PCS | Mod: S$GLB,,, | Performed by: FAMILY MEDICINE

## 2021-03-12 PROCEDURE — 99396 PREV VISIT EST AGE 40-64: CPT | Mod: S$GLB,,, | Performed by: FAMILY MEDICINE

## 2021-03-12 PROCEDURE — 3078F PR MOST RECENT DIASTOLIC BLOOD PRESSURE < 80 MM HG: ICD-10-PCS | Mod: S$GLB,,, | Performed by: FAMILY MEDICINE

## 2021-03-12 PROCEDURE — 3078F DIAST BP <80 MM HG: CPT | Mod: S$GLB,,, | Performed by: FAMILY MEDICINE

## 2021-03-12 PROCEDURE — 1126F PR PAIN SEVERITY QUANTIFIED, NO PAIN PRESENT: ICD-10-PCS | Mod: S$GLB,,, | Performed by: FAMILY MEDICINE

## 2021-03-12 PROCEDURE — 3008F PR BODY MASS INDEX (BMI) DOCUMENTED: ICD-10-PCS | Mod: S$GLB,,, | Performed by: FAMILY MEDICINE

## 2021-03-12 RX ORDER — ROSUVASTATIN CALCIUM 10 MG/1
10 TABLET, COATED ORAL DAILY
Qty: 90 TABLET | Refills: 3 | Status: SHIPPED | OUTPATIENT
Start: 2021-03-12 | End: 2021-10-05 | Stop reason: SDUPTHER

## 2021-03-12 RX ORDER — HYDROGEN PEROXIDE 3 %
20 SOLUTION, NON-ORAL MISCELLANEOUS DAILY PRN
COMMUNITY
End: 2023-03-17

## 2021-03-12 RX ORDER — LOSARTAN POTASSIUM 50 MG/1
TABLET ORAL
Qty: 90 TABLET | Refills: 3 | Status: SHIPPED | OUTPATIENT
Start: 2021-03-12 | End: 2021-04-07 | Stop reason: SDUPTHER

## 2021-03-12 RX ORDER — CETIRIZINE HYDROCHLORIDE 10 MG/1
TABLET ORAL
COMMUNITY
End: 2023-03-17

## 2021-03-12 RX ORDER — LEVOTHYROXINE SODIUM 88 UG/1
TABLET ORAL
Qty: 90 TABLET | Refills: 3 | Status: SHIPPED | OUTPATIENT
Start: 2021-03-12 | End: 2021-10-05 | Stop reason: SDUPTHER

## 2021-03-12 RX ORDER — LOSARTAN POTASSIUM 50 MG/1
TABLET ORAL
COMMUNITY
Start: 2020-03-23 | End: 2021-03-12 | Stop reason: SDUPTHER

## 2021-04-02 ENCOUNTER — HOSPITAL ENCOUNTER (OUTPATIENT)
Dept: RADIOLOGY | Facility: HOSPITAL | Age: 61
Discharge: HOME | End: 2021-04-02
Attending: FAMILY MEDICINE
Payer: COMMERCIAL

## 2021-04-02 DIAGNOSIS — R06.2 WHEEZE: ICD-10-CM

## 2021-04-02 PROCEDURE — 71046 X-RAY EXAM CHEST 2 VIEWS: CPT | Mod: TC

## 2021-04-07 RX ORDER — LOSARTAN POTASSIUM 50 MG/1
TABLET ORAL
Qty: 90 TABLET | Refills: 0 | Status: SHIPPED | OUTPATIENT
Start: 2021-04-07 | End: 2021-09-22 | Stop reason: SDUPTHER

## 2021-05-28 ENCOUNTER — PATIENT MESSAGE (OUTPATIENT)
Dept: UROLOGY | Facility: CLINIC | Age: 61
End: 2021-05-28

## 2021-06-01 ENCOUNTER — LAB VISIT (OUTPATIENT)
Dept: LAB | Facility: HOSPITAL | Age: 61
End: 2021-06-01
Attending: UROLOGY
Payer: COMMERCIAL

## 2021-06-01 ENCOUNTER — OFFICE VISIT (OUTPATIENT)
Dept: UROLOGY | Facility: CLINIC | Age: 61
End: 2021-06-01
Payer: COMMERCIAL

## 2021-06-01 VITALS
WEIGHT: 164 LBS | BODY MASS INDEX: 23.48 KG/M2 | HEIGHT: 70 IN | DIASTOLIC BLOOD PRESSURE: 66 MMHG | SYSTOLIC BLOOD PRESSURE: 117 MMHG | HEART RATE: 68 BPM

## 2021-06-01 DIAGNOSIS — R31.0 GROSS HEMATURIA: Primary | ICD-10-CM

## 2021-06-01 DIAGNOSIS — N41.0 ACUTE PROSTATITIS: ICD-10-CM

## 2021-06-01 DIAGNOSIS — R31.0 GROSS HEMATURIA: ICD-10-CM

## 2021-06-01 DIAGNOSIS — N40.1 BPH WITH OBSTRUCTION/LOWER URINARY TRACT SYMPTOMS: ICD-10-CM

## 2021-06-01 DIAGNOSIS — N13.8 BPH WITH OBSTRUCTION/LOWER URINARY TRACT SYMPTOMS: ICD-10-CM

## 2021-06-01 DIAGNOSIS — C61 PROSTATE CANCER: ICD-10-CM

## 2021-06-01 LAB
ANION GAP SERPL CALC-SCNC: 9 MMOL/L (ref 8–16)
BASOPHILS # BLD AUTO: 0.06 K/UL (ref 0–0.2)
BASOPHILS NFR BLD: 0.7 % (ref 0–1.9)
BILIRUB SERPL-MCNC: ABNORMAL MG/DL
BLOOD URINE, POC: ABNORMAL
BUN SERPL-MCNC: 15 MG/DL (ref 6–20)
CALCIUM SERPL-MCNC: 9.4 MG/DL (ref 8.7–10.5)
CHLORIDE SERPL-SCNC: 102 MMOL/L (ref 95–110)
CLARITY, POC UA: CLEAR
CO2 SERPL-SCNC: 27 MMOL/L (ref 23–29)
COLOR, POC UA: YELLOW
CREAT SERPL-MCNC: 0.9 MG/DL (ref 0.5–1.4)
DIFFERENTIAL METHOD: NORMAL
EOSINOPHIL # BLD AUTO: 0.4 K/UL (ref 0–0.5)
EOSINOPHIL NFR BLD: 4.7 % (ref 0–8)
ERYTHROCYTE [DISTWIDTH] IN BLOOD BY AUTOMATED COUNT: 12 % (ref 11.5–14.5)
EST. GFR  (AFRICAN AMERICAN): >60 ML/MIN/1.73 M^2
EST. GFR  (NON AFRICAN AMERICAN): >60 ML/MIN/1.73 M^2
GLUCOSE SERPL-MCNC: 117 MG/DL (ref 70–110)
GLUCOSE UR QL STRIP: ABNORMAL
HCT VFR BLD AUTO: 42.5 % (ref 40–54)
HGB BLD-MCNC: 14.4 G/DL (ref 14–18)
IMM GRANULOCYTES # BLD AUTO: 0.02 K/UL (ref 0–0.04)
IMM GRANULOCYTES NFR BLD AUTO: 0.2 % (ref 0–0.5)
KETONES UR QL STRIP: ABNORMAL
LEUKOCYTE ESTERASE URINE, POC: ABNORMAL
LYMPHOCYTES # BLD AUTO: 2.9 K/UL (ref 1–4.8)
LYMPHOCYTES NFR BLD: 33.5 % (ref 18–48)
MCH RBC QN AUTO: 29.6 PG (ref 27–31)
MCHC RBC AUTO-ENTMCNC: 33.9 G/DL (ref 32–36)
MCV RBC AUTO: 87 FL (ref 82–98)
MONOCYTES # BLD AUTO: 0.5 K/UL (ref 0.3–1)
MONOCYTES NFR BLD: 5.5 % (ref 4–15)
NEUTROPHILS # BLD AUTO: 4.7 K/UL (ref 1.8–7.7)
NEUTROPHILS NFR BLD: 55.4 % (ref 38–73)
NITRITE, POC UA: ABNORMAL
NRBC BLD-RTO: 0 /100 WBC
PH, POC UA: 5.5
PLATELET # BLD AUTO: 163 K/UL (ref 150–450)
PMV BLD AUTO: 11.2 FL (ref 9.2–12.9)
POC RESIDUAL URINE VOLUME: 0 ML (ref 0–100)
POTASSIUM SERPL-SCNC: 4.7 MMOL/L (ref 3.5–5.1)
PROTEIN, POC: ABNORMAL
RBC # BLD AUTO: 4.87 M/UL (ref 4.6–6.2)
SODIUM SERPL-SCNC: 138 MMOL/L (ref 136–145)
SPECIFIC GRAVITY, POC UA: >=1.03
UROBILINOGEN, POC UA: 0.2
WBC # BLD AUTO: 8.53 K/UL (ref 3.9–12.7)

## 2021-06-01 PROCEDURE — 80048 BASIC METABOLIC PNL TOTAL CA: CPT | Performed by: UROLOGY

## 2021-06-01 PROCEDURE — 88112 PR  CYTOPATH, CELL ENHANCE TECH: ICD-10-PCS | Mod: 26,,, | Performed by: PATHOLOGY

## 2021-06-01 PROCEDURE — 99214 OFFICE O/P EST MOD 30 MIN: CPT | Mod: 25,S$GLB,, | Performed by: UROLOGY

## 2021-06-01 PROCEDURE — 81002 POCT URINE DIPSTICK WITHOUT MICROSCOPE: ICD-10-PCS | Mod: S$GLB,,, | Performed by: UROLOGY

## 2021-06-01 PROCEDURE — 87086 URINE CULTURE/COLONY COUNT: CPT | Performed by: UROLOGY

## 2021-06-01 PROCEDURE — 88112 CYTOPATH CELL ENHANCE TECH: CPT | Mod: 26,,, | Performed by: PATHOLOGY

## 2021-06-01 PROCEDURE — 88112 CYTOPATH CELL ENHANCE TECH: CPT | Performed by: PATHOLOGY

## 2021-06-01 PROCEDURE — 81002 URINALYSIS NONAUTO W/O SCOPE: CPT | Mod: S$GLB,,, | Performed by: UROLOGY

## 2021-06-01 PROCEDURE — 85025 COMPLETE CBC W/AUTO DIFF WBC: CPT | Performed by: UROLOGY

## 2021-06-01 PROCEDURE — 1126F AMNT PAIN NOTED NONE PRSNT: CPT | Mod: S$GLB,,, | Performed by: UROLOGY

## 2021-06-01 PROCEDURE — 99214 PR OFFICE/OUTPT VISIT, EST, LEVL IV, 30-39 MIN: ICD-10-PCS | Mod: 25,S$GLB,, | Performed by: UROLOGY

## 2021-06-01 PROCEDURE — 36415 COLL VENOUS BLD VENIPUNCTURE: CPT | Performed by: UROLOGY

## 2021-06-01 PROCEDURE — 99999 PR PBB SHADOW E&M-EST. PATIENT-LVL III: CPT | Mod: PBBFAC,,, | Performed by: UROLOGY

## 2021-06-01 PROCEDURE — 3008F BODY MASS INDEX DOCD: CPT | Mod: CPTII,S$GLB,, | Performed by: UROLOGY

## 2021-06-01 PROCEDURE — 3008F PR BODY MASS INDEX (BMI) DOCUMENTED: ICD-10-PCS | Mod: CPTII,S$GLB,, | Performed by: UROLOGY

## 2021-06-01 PROCEDURE — 1126F PR PAIN SEVERITY QUANTIFIED, NO PAIN PRESENT: ICD-10-PCS | Mod: S$GLB,,, | Performed by: UROLOGY

## 2021-06-01 PROCEDURE — 51798 POCT BLADDER SCAN: ICD-10-PCS | Mod: S$GLB,,, | Performed by: UROLOGY

## 2021-06-01 PROCEDURE — 99999 PR PBB SHADOW E&M-EST. PATIENT-LVL III: ICD-10-PCS | Mod: PBBFAC,,, | Performed by: UROLOGY

## 2021-06-01 PROCEDURE — 51798 US URINE CAPACITY MEASURE: CPT | Mod: S$GLB,,, | Performed by: UROLOGY

## 2021-06-01 RX ORDER — TAMSULOSIN HYDROCHLORIDE 0.4 MG/1
0.4 CAPSULE ORAL 2 TIMES DAILY
Qty: 180 CAPSULE | Refills: 3 | Status: SHIPPED | OUTPATIENT
Start: 2021-06-01 | End: 2021-11-19 | Stop reason: SDUPTHER

## 2021-06-01 RX ORDER — DOXYCYCLINE 100 MG/1
100 CAPSULE ORAL 2 TIMES DAILY
Qty: 20 CAPSULE | Refills: 0 | Status: SHIPPED | OUTPATIENT
Start: 2021-06-01 | End: 2021-06-11

## 2021-06-03 ENCOUNTER — HOSPITAL ENCOUNTER (OUTPATIENT)
Dept: RADIOLOGY | Facility: HOSPITAL | Age: 61
Discharge: HOME | End: 2021-06-03
Attending: UROLOGY
Payer: COMMERCIAL

## 2021-06-03 DIAGNOSIS — R31.0 GROSS HEMATURIA: ICD-10-CM

## 2021-06-03 LAB
BACTERIA UR CULT: NO GROWTH
FINAL PATHOLOGIC DIAGNOSIS: ABNORMAL
Lab: ABNORMAL

## 2021-06-03 PROCEDURE — 74178 CT ABD&PLV WO CNTR FLWD CNTR: CPT | Mod: TC

## 2021-06-03 PROCEDURE — 74178 CT ABD&PLV WO CNTR FLWD CNTR: CPT | Mod: 26,,, | Performed by: RADIOLOGY

## 2021-06-03 PROCEDURE — 25500020 PHARM REV CODE 255

## 2021-06-03 PROCEDURE — 74178 CT UROGRAM ABD PELVIS W WO: ICD-10-PCS | Mod: 26,,, | Performed by: RADIOLOGY

## 2021-06-03 RX ADMIN — IOHEXOL 125 ML: 350 INJECTION, SOLUTION INTRAVENOUS at 05:06

## 2021-08-14 ENCOUNTER — CLINICAL SUPPORT (OUTPATIENT)
Dept: OTHER | Facility: CLINIC | Age: 61
End: 2021-08-14
Payer: COMMERCIAL

## 2021-08-14 DIAGNOSIS — Z00.8 ENCOUNTER FOR OTHER GENERAL EXAMINATION: ICD-10-CM

## 2021-08-15 VITALS — HEIGHT: 70 IN | BODY MASS INDEX: 23.53 KG/M2

## 2021-08-15 LAB
HDLC SERPL-MCNC: 39 MG/DL
POC CHOLESTEROL, LDL (DOCK): 85 MG/DL
POC CHOLESTEROL, TOTAL: 150 MG/DL
POC GLUCOSE, FASTING: 108 MG/DL (ref 60–110)
TRIGL SERPL-MCNC: 128 MG/DL

## 2021-09-22 RX ORDER — LOSARTAN POTASSIUM 50 MG/1
TABLET ORAL
Qty: 90 TABLET | Refills: 2 | Status: SHIPPED | OUTPATIENT
Start: 2021-09-22 | End: 2022-09-21 | Stop reason: SDUPTHER

## 2021-09-22 RX ORDER — LOSARTAN POTASSIUM 50 MG/1
TABLET ORAL
Qty: 90 TABLET | Refills: 0 | Status: CANCELLED | OUTPATIENT
Start: 2021-09-22

## 2021-10-05 DIAGNOSIS — E78.01 FAMILIAL HYPERCHOLESTEROLEMIA: ICD-10-CM

## 2021-10-05 DIAGNOSIS — E03.9 HYPOTHYROIDISM, UNSPECIFIED TYPE: ICD-10-CM

## 2021-10-05 RX ORDER — LEVOTHYROXINE SODIUM 88 UG/1
TABLET ORAL
Qty: 90 TABLET | Refills: 3 | Status: SHIPPED | OUTPATIENT
Start: 2021-10-05 | End: 2022-07-01

## 2021-10-05 RX ORDER — ROSUVASTATIN CALCIUM 10 MG/1
10 TABLET, COATED ORAL DAILY
Qty: 90 TABLET | Refills: 3 | Status: SHIPPED | OUTPATIENT
Start: 2021-10-05 | End: 2021-12-16 | Stop reason: SDUPTHER

## 2021-10-29 ENCOUNTER — IMMUNIZATION (OUTPATIENT)
Dept: PRIMARY CARE CLINIC | Facility: CLINIC | Age: 61
End: 2021-10-29
Payer: COMMERCIAL

## 2021-10-29 DIAGNOSIS — Z23 NEED FOR VACCINATION: Primary | ICD-10-CM

## 2021-10-29 PROCEDURE — 0003A COVID-19, MRNA, LNP-S, PF, 30 MCG/0.3 ML DOSE VACCINE: ICD-10-PCS | Mod: S$GLB,,, | Performed by: FAMILY MEDICINE

## 2021-10-29 PROCEDURE — 91300 COVID-19, MRNA, LNP-S, PF, 30 MCG/0.3 ML DOSE VACCINE: ICD-10-PCS | Mod: S$GLB,,, | Performed by: FAMILY MEDICINE

## 2021-10-29 PROCEDURE — 0003A COVID-19, MRNA, LNP-S, PF, 30 MCG/0.3 ML DOSE VACCINE: CPT | Mod: S$GLB,,, | Performed by: FAMILY MEDICINE

## 2021-10-29 PROCEDURE — 91300 COVID-19, MRNA, LNP-S, PF, 30 MCG/0.3 ML DOSE VACCINE: CPT | Mod: S$GLB,,, | Performed by: FAMILY MEDICINE

## 2021-11-15 ENCOUNTER — TELEPHONE (OUTPATIENT)
Dept: CARDIOLOGY | Facility: CLINIC | Age: 61
End: 2021-11-15
Payer: COMMERCIAL

## 2021-11-17 ENCOUNTER — LAB VISIT (OUTPATIENT)
Dept: LAB | Facility: HOSPITAL | Age: 61
End: 2021-11-17
Attending: UROLOGY
Payer: COMMERCIAL

## 2021-11-17 DIAGNOSIS — C61 PROSTATE CANCER: ICD-10-CM

## 2021-11-17 LAB — COMPLEXED PSA SERPL-MCNC: 4.1 NG/ML (ref 0–4)

## 2021-11-17 PROCEDURE — 84153 ASSAY OF PSA TOTAL: CPT | Performed by: UROLOGY

## 2021-11-17 PROCEDURE — 36415 COLL VENOUS BLD VENIPUNCTURE: CPT | Performed by: UROLOGY

## 2021-11-19 ENCOUNTER — OFFICE VISIT (OUTPATIENT)
Dept: UROLOGY | Facility: CLINIC | Age: 61
End: 2021-11-19
Payer: COMMERCIAL

## 2021-11-19 VITALS
DIASTOLIC BLOOD PRESSURE: 62 MMHG | SYSTOLIC BLOOD PRESSURE: 117 MMHG | HEIGHT: 70 IN | WEIGHT: 162.25 LBS | RESPIRATION RATE: 18 BRPM | HEART RATE: 55 BPM | BODY MASS INDEX: 23.23 KG/M2

## 2021-11-19 DIAGNOSIS — N40.1 BPH WITH OBSTRUCTION/LOWER URINARY TRACT SYMPTOMS: ICD-10-CM

## 2021-11-19 DIAGNOSIS — N13.8 BPH WITH OBSTRUCTION/LOWER URINARY TRACT SYMPTOMS: ICD-10-CM

## 2021-11-19 DIAGNOSIS — C61 PROSTATE CANCER: Primary | ICD-10-CM

## 2021-11-19 DIAGNOSIS — Z87.442 HISTORY OF KIDNEY STONES: ICD-10-CM

## 2021-11-19 LAB
BILIRUB SERPL-MCNC: ABNORMAL MG/DL
BLOOD URINE, POC: ABNORMAL
CLARITY, POC UA: CLEAR
COLOR, POC UA: YELLOW
GLUCOSE UR QL STRIP: 500
KETONES UR QL STRIP: ABNORMAL
LEUKOCYTE ESTERASE URINE, POC: ABNORMAL
NITRITE, POC UA: ABNORMAL
PH, POC UA: 5.5
POC RESIDUAL URINE VOLUME: 14 ML (ref 0–100)
PROTEIN, POC: ABNORMAL
SPECIFIC GRAVITY, POC UA: 1.03
UROBILINOGEN, POC UA: 0.2

## 2021-11-19 PROCEDURE — 99999 PR PBB SHADOW E&M-EST. PATIENT-LVL IV: CPT | Mod: PBBFAC,,, | Performed by: UROLOGY

## 2021-11-19 PROCEDURE — 4010F ACE/ARB THERAPY RXD/TAKEN: CPT | Mod: CPTII,S$GLB,, | Performed by: UROLOGY

## 2021-11-19 PROCEDURE — 81002 URINALYSIS NONAUTO W/O SCOPE: CPT | Mod: S$GLB,,, | Performed by: UROLOGY

## 2021-11-19 PROCEDURE — 99999 PR PBB SHADOW E&M-EST. PATIENT-LVL IV: ICD-10-PCS | Mod: PBBFAC,,, | Performed by: UROLOGY

## 2021-11-19 PROCEDURE — 4010F PR ACE/ARB THEARPY RXD/TAKEN: ICD-10-PCS | Mod: CPTII,S$GLB,, | Performed by: UROLOGY

## 2021-11-19 PROCEDURE — 99215 PR OFFICE/OUTPT VISIT, EST, LEVL V, 40-54 MIN: ICD-10-PCS | Mod: S$GLB,,, | Performed by: UROLOGY

## 2021-11-19 PROCEDURE — 51798 US URINE CAPACITY MEASURE: CPT | Mod: S$GLB,,, | Performed by: UROLOGY

## 2021-11-19 PROCEDURE — 3066F PR DOCUMENTATION OF TREATMENT FOR NEPHROPATHY: ICD-10-PCS | Mod: CPTII,S$GLB,, | Performed by: UROLOGY

## 2021-11-19 PROCEDURE — 51798 POCT BLADDER SCAN: ICD-10-PCS | Mod: S$GLB,,, | Performed by: UROLOGY

## 2021-11-19 PROCEDURE — 3061F NEG MICROALBUMINURIA REV: CPT | Mod: CPTII,S$GLB,, | Performed by: UROLOGY

## 2021-11-19 PROCEDURE — 99215 OFFICE O/P EST HI 40 MIN: CPT | Mod: S$GLB,,, | Performed by: UROLOGY

## 2021-11-19 PROCEDURE — 3061F PR NEG MICROALBUMINURIA RESULT DOCUMENTED/REVIEW: ICD-10-PCS | Mod: CPTII,S$GLB,, | Performed by: UROLOGY

## 2021-11-19 PROCEDURE — 81002 POCT URINE DIPSTICK WITHOUT MICROSCOPE: ICD-10-PCS | Mod: S$GLB,,, | Performed by: UROLOGY

## 2021-11-19 PROCEDURE — 3066F NEPHROPATHY DOC TX: CPT | Mod: CPTII,S$GLB,, | Performed by: UROLOGY

## 2021-11-19 RX ORDER — GENTAMICIN SULFATE 40 MG/ML
80 INJECTION, SOLUTION INTRAMUSCULAR; INTRAVENOUS ONCE
Status: CANCELLED | OUTPATIENT
Start: 2021-11-19

## 2021-11-19 RX ORDER — CIPROFLOXACIN 500 MG/1
500 TABLET ORAL 2 TIMES DAILY
Qty: 6 TABLET | Refills: 0 | Status: SHIPPED | OUTPATIENT
Start: 2021-11-19 | End: 2022-07-29 | Stop reason: SDUPTHER

## 2021-11-19 RX ORDER — TAMSULOSIN HYDROCHLORIDE 0.4 MG/1
0.8 CAPSULE ORAL DAILY
Qty: 180 CAPSULE | Refills: 3 | Status: SHIPPED | OUTPATIENT
Start: 2021-11-19 | End: 2022-07-25 | Stop reason: SDUPTHER

## 2021-11-30 DIAGNOSIS — Z20.828 EXPOSURE TO INFLUENZA: Primary | ICD-10-CM

## 2021-11-30 DIAGNOSIS — Z20.828 EXPOSURE TO INFLUENZA: ICD-10-CM

## 2021-11-30 RX ORDER — BALOXAVIR MARBOXIL 40 MG/1
80 TABLET, FILM COATED ORAL ONCE
Qty: 2 TABLET | Refills: 0 | Status: SHIPPED | OUTPATIENT
Start: 2021-11-30 | End: 2021-11-30 | Stop reason: SDUPTHER

## 2021-11-30 RX ORDER — BALOXAVIR MARBOXIL 40 MG/1
80 TABLET, FILM COATED ORAL ONCE
Qty: 2 TABLET | Refills: 0 | Status: SHIPPED | OUTPATIENT
Start: 2021-11-30 | End: 2021-12-01

## 2021-12-14 ENCOUNTER — TELEPHONE (OUTPATIENT)
Dept: UROLOGY | Facility: CLINIC | Age: 61
End: 2021-12-14
Payer: COMMERCIAL

## 2021-12-16 ENCOUNTER — OFFICE VISIT (OUTPATIENT)
Dept: CARDIOLOGY | Facility: CLINIC | Age: 61
End: 2021-12-16
Payer: COMMERCIAL

## 2021-12-16 VITALS
WEIGHT: 162 LBS | SYSTOLIC BLOOD PRESSURE: 132 MMHG | HEART RATE: 56 BPM | HEIGHT: 70 IN | DIASTOLIC BLOOD PRESSURE: 70 MMHG | BODY MASS INDEX: 23.19 KG/M2

## 2021-12-16 DIAGNOSIS — I10 HYPERTENSION, UNSPECIFIED TYPE: Primary | ICD-10-CM

## 2021-12-16 DIAGNOSIS — I25.10 CORONARY ARTERY DISEASE WITHOUT ANGINA PECTORIS, UNSPECIFIED VESSEL OR LESION TYPE, UNSPECIFIED WHETHER NATIVE OR TRANSPLANTED HEART: ICD-10-CM

## 2021-12-16 DIAGNOSIS — I35.1 AORTIC EJECTION MURMUR: ICD-10-CM

## 2021-12-16 DIAGNOSIS — R97.20 ELEVATED PSA: ICD-10-CM

## 2021-12-16 DIAGNOSIS — R94.31 NONSPECIFIC ABNORMAL ELECTROCARDIOGRAM (ECG) (EKG): ICD-10-CM

## 2021-12-16 DIAGNOSIS — E78.01 FAMILIAL HYPERCHOLESTEROLEMIA: ICD-10-CM

## 2021-12-16 DIAGNOSIS — R06.02 SHORTNESS OF BREATH: ICD-10-CM

## 2021-12-16 DIAGNOSIS — E03.9 HYPOTHYROIDISM, UNSPECIFIED TYPE: ICD-10-CM

## 2021-12-16 PROCEDURE — 4010F ACE/ARB THERAPY RXD/TAKEN: CPT | Mod: CPTII,S$GLB,, | Performed by: INTERNAL MEDICINE

## 2021-12-16 PROCEDURE — 93000 ELECTROCARDIOGRAM COMPLETE: CPT | Mod: S$GLB,,, | Performed by: INTERNAL MEDICINE

## 2021-12-16 PROCEDURE — 3066F PR DOCUMENTATION OF TREATMENT FOR NEPHROPATHY: ICD-10-PCS | Mod: CPTII,S$GLB,, | Performed by: INTERNAL MEDICINE

## 2021-12-16 PROCEDURE — 93000 EKG 12-LEAD: ICD-10-PCS | Mod: S$GLB,,, | Performed by: INTERNAL MEDICINE

## 2021-12-16 PROCEDURE — 4010F PR ACE/ARB THEARPY RXD/TAKEN: ICD-10-PCS | Mod: CPTII,S$GLB,, | Performed by: INTERNAL MEDICINE

## 2021-12-16 PROCEDURE — 99204 OFFICE O/P NEW MOD 45 MIN: CPT | Mod: S$GLB,,, | Performed by: INTERNAL MEDICINE

## 2021-12-16 PROCEDURE — 3061F PR NEG MICROALBUMINURIA RESULT DOCUMENTED/REVIEW: ICD-10-PCS | Mod: CPTII,S$GLB,, | Performed by: INTERNAL MEDICINE

## 2021-12-16 PROCEDURE — 3061F NEG MICROALBUMINURIA REV: CPT | Mod: CPTII,S$GLB,, | Performed by: INTERNAL MEDICINE

## 2021-12-16 PROCEDURE — 3066F NEPHROPATHY DOC TX: CPT | Mod: CPTII,S$GLB,, | Performed by: INTERNAL MEDICINE

## 2021-12-16 PROCEDURE — 99204 PR OFFICE/OUTPT VISIT, NEW, LEVL IV, 45-59 MIN: ICD-10-PCS | Mod: S$GLB,,, | Performed by: INTERNAL MEDICINE

## 2021-12-16 RX ORDER — ROSUVASTATIN CALCIUM 10 MG/1
15 TABLET, COATED ORAL NIGHTLY
Qty: 135 TABLET | Refills: 3 | Status: SHIPPED | OUTPATIENT
Start: 2021-12-16 | End: 2022-12-16

## 2021-12-16 RX ORDER — LEVOTHYROXINE SODIUM 88 UG/1
TABLET ORAL
Qty: 90 TABLET | Refills: 3 | Status: SHIPPED | OUTPATIENT
Start: 2021-12-16 | End: 2022-07-01

## 2022-02-11 ENCOUNTER — HOSPITAL ENCOUNTER (OUTPATIENT)
Dept: RADIOLOGY | Facility: CLINIC | Age: 62
Discharge: HOME | End: 2022-02-11
Attending: INTERNAL MEDICINE
Payer: COMMERCIAL

## 2022-02-11 ENCOUNTER — HOSPITAL ENCOUNTER (OUTPATIENT)
Dept: CARDIOLOGY | Facility: CLINIC | Age: 62
Discharge: HOME | End: 2022-02-11
Attending: INTERNAL MEDICINE
Payer: COMMERCIAL

## 2022-02-11 DIAGNOSIS — R97.20 ELEVATED PSA: ICD-10-CM

## 2022-02-11 DIAGNOSIS — I25.10 CORONARY ARTERY DISEASE WITHOUT ANGINA PECTORIS, UNSPECIFIED VESSEL OR LESION TYPE, UNSPECIFIED WHETHER NATIVE OR TRANSPLANTED HEART: ICD-10-CM

## 2022-02-11 DIAGNOSIS — E03.9 HYPOTHYROIDISM, UNSPECIFIED TYPE: ICD-10-CM

## 2022-02-11 DIAGNOSIS — I10 HYPERTENSION, UNSPECIFIED TYPE: ICD-10-CM

## 2022-02-11 DIAGNOSIS — I35.1 AORTIC EJECTION MURMUR: ICD-10-CM

## 2022-02-11 DIAGNOSIS — E78.01 FAMILIAL HYPERCHOLESTEROLEMIA: ICD-10-CM

## 2022-02-11 LAB
CV STRESS BASE HR: 49 BPM
DIASTOLIC BLOOD PRESSURE: 64 MMHG
EJECTION FRACTION- HIGH: 65 %
END DIASTOLIC INDEX-HIGH: 158 ML/M2
END DIASTOLIC INDEX-LOW: 94 ML/M2
END SYSTOLIC INDEX-HIGH: 71 ML/M2
END SYSTOLIC INDEX-LOW: 33 ML/M2
NUC STRESS DIASTOLIC VOLUME INDEX: 65
NUC STRESS EJECTION FRACTION: 70 %
NUC STRESS SYSTOLIC VOLUME INDEX: 20
OHS CV CPX 1 MINUTE RECOVERY HEART RATE: 122 BPM
OHS CV CPX 85 PERCENT MAX PREDICTED HEART RATE MALE: 136
OHS CV CPX ESTIMATED METS: 11
OHS CV CPX MAX PREDICTED HEART RATE: 160
OHS CV CPX PATIENT IS FEMALE: 0
OHS CV CPX PATIENT IS MALE: 1
OHS CV CPX PEAK DIASTOLIC BLOOD PRESSURE: 76 MMHG
OHS CV CPX PEAK HEAR RATE: 148 BPM
OHS CV CPX PEAK RATE PRESSURE PRODUCT: NORMAL
OHS CV CPX PEAK SYSTOLIC BLOOD PRESSURE: 174 MMHG
OHS CV CPX PERCENT MAX PREDICTED HEART RATE ACHIEVED: 93
OHS CV CPX RATE PRESSURE PRODUCT PRESENTING: 6272
RETIRED EF AND QEF - SEE NOTES: 53 %
STRESS ECHO POST EXERCISE DUR MIN: 9 MINUTES
STRESS ECHO POST EXERCISE DUR SEC: 24 SECONDS
STRESS ST DEPRESSION: 1.2 MM
SYSTOLIC BLOOD PRESSURE: 128 MMHG

## 2022-02-11 PROCEDURE — 78452 STRESS TEST WITH MYOCARDIAL PERFUSION (CUPID ONLY): ICD-10-PCS | Mod: S$GLB,,, | Performed by: INTERNAL MEDICINE

## 2022-02-11 PROCEDURE — 93015 STRESS TEST WITH MYOCARDIAL PERFUSION (CUPID ONLY): ICD-10-PCS | Mod: S$GLB,,, | Performed by: INTERNAL MEDICINE

## 2022-02-11 PROCEDURE — 78452 HT MUSCLE IMAGE SPECT MULT: CPT | Mod: S$GLB,,, | Performed by: INTERNAL MEDICINE

## 2022-02-11 PROCEDURE — A9502 TC99M TETROFOSMIN: HCPCS | Mod: S$GLB,,, | Performed by: INTERNAL MEDICINE

## 2022-02-11 PROCEDURE — A9502 STRESS TEST WITH MYOCARDIAL PERFUSION (CUPID ONLY): ICD-10-PCS | Mod: S$GLB,,, | Performed by: INTERNAL MEDICINE

## 2022-02-11 PROCEDURE — 93015 CV STRESS TEST SUPVJ I&R: CPT | Mod: S$GLB,,, | Performed by: INTERNAL MEDICINE

## 2022-02-15 RX ORDER — ISOSORBIDE MONONITRATE 30 MG/1
30 TABLET, EXTENDED RELEASE ORAL DAILY
Qty: 90 TABLET | Refills: 3 | Status: SHIPPED | OUTPATIENT
Start: 2022-02-15 | End: 2023-07-23

## 2022-07-01 DIAGNOSIS — E03.9 HYPOTHYROIDISM, UNSPECIFIED TYPE: ICD-10-CM

## 2022-07-01 RX ORDER — LEVOTHYROXINE SODIUM 88 UG/1
TABLET ORAL
Qty: 90 TABLET | Refills: 3 | Status: SHIPPED | OUTPATIENT
Start: 2022-07-01 | End: 2023-07-19 | Stop reason: SDUPTHER

## 2022-07-25 RX ORDER — ROSUVASTATIN CALCIUM 20 MG/1
TABLET, COATED ORAL
Qty: 90 TABLET | Refills: 1 | Status: CANCELLED | OUTPATIENT
Start: 2022-07-25

## 2022-07-25 RX ORDER — TAMSULOSIN HYDROCHLORIDE 0.4 MG/1
0.8 CAPSULE ORAL DAILY
Qty: 180 CAPSULE | Refills: 3 | Status: SHIPPED | OUTPATIENT
Start: 2022-07-25 | End: 2023-03-17

## 2022-07-26 RX ORDER — ROSUVASTATIN CALCIUM 20 MG/1
TABLET, COATED ORAL
Qty: 90 TABLET | Refills: 1 | Status: SHIPPED | OUTPATIENT
Start: 2022-07-26 | End: 2022-12-28 | Stop reason: SDUPTHER

## 2022-07-29 ENCOUNTER — PATIENT MESSAGE (OUTPATIENT)
Dept: UROLOGY | Facility: CLINIC | Age: 62
End: 2022-07-29
Payer: COMMERCIAL

## 2022-07-29 DIAGNOSIS — C61 PROSTATE CANCER: Primary | ICD-10-CM

## 2022-07-29 RX ORDER — CIPROFLOXACIN 500 MG/1
500 TABLET ORAL 2 TIMES DAILY
Qty: 6 TABLET | Refills: 0 | Status: SHIPPED | OUTPATIENT
Start: 2022-07-29 | End: 2022-08-08 | Stop reason: SDUPTHER

## 2022-07-29 NOTE — TELEPHONE ENCOUNTER
Book prostate biopsy in clinic 0845 on 8/12/22 (on appt note 80gent IM on arrival)   Slot adjusted to procedure to accomodate   Do PSA lab visit in next week.   3d cipro sent to pharmacy   Verify has instructions and/or send again to pt portal

## 2022-08-03 ENCOUNTER — LAB VISIT (OUTPATIENT)
Dept: LAB | Facility: HOSPITAL | Age: 62
End: 2022-08-03
Attending: UROLOGY
Payer: COMMERCIAL

## 2022-08-03 DIAGNOSIS — C61 PROSTATE CANCER: ICD-10-CM

## 2022-08-03 LAB — COMPLEXED PSA SERPL-MCNC: 4.3 NG/ML (ref 0–4)

## 2022-08-03 PROCEDURE — 36415 COLL VENOUS BLD VENIPUNCTURE: CPT | Performed by: UROLOGY

## 2022-08-03 PROCEDURE — 84153 ASSAY OF PSA TOTAL: CPT | Performed by: UROLOGY

## 2022-08-08 ENCOUNTER — PATIENT MESSAGE (OUTPATIENT)
Dept: UROLOGY | Facility: CLINIC | Age: 62
End: 2022-08-08
Payer: COMMERCIAL

## 2022-08-08 RX ORDER — CIPROFLOXACIN 500 MG/1
500 TABLET ORAL 2 TIMES DAILY
Qty: 6 TABLET | Refills: 0 | Status: SHIPPED | OUTPATIENT
Start: 2022-08-08 | End: 2022-08-26

## 2022-08-12 ENCOUNTER — PROCEDURE VISIT (OUTPATIENT)
Dept: UROLOGY | Facility: CLINIC | Age: 62
End: 2022-08-12
Payer: COMMERCIAL

## 2022-08-12 VITALS — SYSTOLIC BLOOD PRESSURE: 132 MMHG | HEART RATE: 59 BPM | DIASTOLIC BLOOD PRESSURE: 74 MMHG

## 2022-08-12 DIAGNOSIS — C61 PROSTATE CANCER: Primary | ICD-10-CM

## 2022-08-12 PROCEDURE — 96372 THER/PROPH/DIAG INJ SC/IM: CPT | Mod: 59,S$GLB,, | Performed by: UROLOGY

## 2022-08-12 PROCEDURE — 88305 TISSUE EXAM BY PATHOLOGIST: CPT | Mod: 26,,, | Performed by: PATHOLOGY

## 2022-08-12 PROCEDURE — 96372 PR INJECTION,THERAP/PROPH/DIAG2ST, IM OR SUBCUT: ICD-10-PCS | Mod: 59,S$GLB,, | Performed by: UROLOGY

## 2022-08-12 PROCEDURE — 88305 TISSUE EXAM BY PATHOLOGIST: ICD-10-PCS | Mod: 26,,, | Performed by: PATHOLOGY

## 2022-08-12 PROCEDURE — 55700 TRUS: ICD-10-PCS | Mod: S$GLB,,, | Performed by: UROLOGY

## 2022-08-12 PROCEDURE — 55700 TRUS: CPT | Mod: S$GLB,,, | Performed by: UROLOGY

## 2022-08-12 PROCEDURE — 76872 TRUS: ICD-10-PCS | Mod: 26,S$GLB,, | Performed by: UROLOGY

## 2022-08-12 PROCEDURE — 88305 TISSUE EXAM BY PATHOLOGIST: CPT | Performed by: PATHOLOGY

## 2022-08-12 PROCEDURE — 76872 US TRANSRECTAL: CPT | Mod: 26,S$GLB,, | Performed by: UROLOGY

## 2022-08-12 RX ORDER — GENTAMICIN SULFATE 40 MG/ML
80 INJECTION, SOLUTION INTRAMUSCULAR; INTRAVENOUS ONCE
Status: COMPLETED | OUTPATIENT
Start: 2022-08-12 | End: 2022-08-12

## 2022-08-12 RX ADMIN — GENTAMICIN SULFATE 80 MG: 40 INJECTION, SOLUTION INTRAMUSCULAR; INTRAVENOUS at 09:08

## 2022-08-13 NOTE — PROCEDURES
"TRUS    Date/Time: 8/12/2022 8:45 AM  Performed by: Gilmer Daly MD  Authorized by: Gilmer Daly MD     Consent Done?:  Yes (Written)  Time out: Immediately prior to procedure a "time out" was called to verify the correct patient, procedure, equipment, support staff and site/side marked as required.    Indications: Prostate Cancer    Position:  Left lateral  Total Biopsies:  20    Patient tolerance:  Patient tolerated the procedure well with no immediate complications    [facility] Procedure Note     Pre-Op Diagnosis:   Prostate cancer     Post-Op Diagnosis: same     Procedure(s) Performed:   Transrectal ultrasound guided prostate needle biopsy     INDICATION FOR PROCEDURE:   AS for CaP found [place] 6 in RB/RM and LB with MRI with pirad 4 lesion P post peripheral zone in 2020 but relatively stable 4 range psa, also being managed for bph/luts     ANESTHESIA: Local periprostatic block; 16 cc 1% lidocaine      PSA: 4.3      TRUS VOLUME: 61.2cc (W 52.7mm; H 42.2mm; L 71.7mm)     EBL: Minimal     SPECIMEN:   20 core prostate biopsy - right and left base, middle, apex - medial and lateral of each, and bilateral transition zones  - additional cores (to total 2 each) at RB lat/med, RM lat/med, and LB lateral  - RM post peripheral zone core     ULTRASOUND FINDINGS:  trace residual bladder volume with intravesical prostate extension and some early median lobe, normal SVs, more clustered hypeoechoity and/or heterogeneity of right base, and area of lower density right mid posterior peripheral zone correlating with past MRI.      CONFIRMED PATIENT TOOK ANTIBIOTICS: Yes     CONFIRMED PATIENT NOT TAKING ASPIRIN OR ANTICOAGULANTS: Yes     CONFIRMED PATIENT USED ENEMA: Yes     PROCEDURE IN DETAIL:  After informed consent, the patient was prepped and drapped in standard  cystoscopic fashion and 2% xylocaine jelly was instilled into the urethra. 80mg gentamicin injected IM      Then a a flexible cystoscope was passed " into the bladder via the urethra.   Anterior urethra normal without lesions or narrowings. The prostatic urethra demonstrated obstructive findings as above from  Lateral lobe obstruction with median lobe as described.     Bladder otherwise systematically inspected and no mucosal lesions or tumors. Assess for signs of obstruction such as trabeculations, cellules, diverticulum as noted above     He was then turned to the left lateral position and TRUS probe inserted into rectum. Ultrasound measurements taken as above and ultrasound of prostate performed with findings as above. Noted to empty bladder well based on US. Approximately 10cc of 1% lidocaine injected bilaterally in petty-prostatic block fashion, as well as at the apex.   14 total core biopies taken were taken in a sextant fashion with inclusion of transition zones. Extra cores targeting areas of previous positivity and MRI as above     DISHARGE:  Status post uncomplicated outpatient procedure as above.    Disposition: Home.  He will follow up in 2 weeks for biopsy results.   Resume regular diet  FU 2 weeks for biopsy results and further management discussion  Return to ER if temp >101, uncontrollable urethral or rectal bleeding, or inability to urinate/urinary retention  No sex/ejaculation x3 days, no riding mowers/tractors/bikes x2-4 weeks  Drink plenty of water may see blood  Hold asa/fishoil/bloodthinners 3-5d

## 2022-08-16 LAB
FINAL PATHOLOGIC DIAGNOSIS: NORMAL
GROSS: NORMAL
Lab: NORMAL

## 2022-08-17 ENCOUNTER — PATIENT MESSAGE (OUTPATIENT)
Dept: UROLOGY | Facility: CLINIC | Age: 62
End: 2022-08-17
Payer: COMMERCIAL

## 2022-08-19 ENCOUNTER — OFFICE VISIT (OUTPATIENT)
Dept: UROLOGY | Facility: CLINIC | Age: 62
End: 2022-08-19
Payer: COMMERCIAL

## 2022-08-19 VITALS
SYSTOLIC BLOOD PRESSURE: 130 MMHG | DIASTOLIC BLOOD PRESSURE: 74 MMHG | HEIGHT: 70 IN | WEIGHT: 160.94 LBS | HEART RATE: 56 BPM | BODY MASS INDEX: 23.04 KG/M2

## 2022-08-19 DIAGNOSIS — C61 PROSTATE CANCER: Primary | ICD-10-CM

## 2022-08-19 DIAGNOSIS — N13.8 BPH WITH OBSTRUCTION/LOWER URINARY TRACT SYMPTOMS: ICD-10-CM

## 2022-08-19 DIAGNOSIS — N40.1 BPH WITH OBSTRUCTION/LOWER URINARY TRACT SYMPTOMS: ICD-10-CM

## 2022-08-19 PROCEDURE — 3008F PR BODY MASS INDEX (BMI) DOCUMENTED: ICD-10-PCS | Mod: CPTII,S$GLB,, | Performed by: UROLOGY

## 2022-08-19 PROCEDURE — 99999 PR PBB SHADOW E&M-EST. PATIENT-LVL III: ICD-10-PCS | Mod: PBBFAC,,, | Performed by: UROLOGY

## 2022-08-19 PROCEDURE — 1159F PR MEDICATION LIST DOCUMENTED IN MEDICAL RECORD: ICD-10-PCS | Mod: CPTII,S$GLB,, | Performed by: UROLOGY

## 2022-08-19 PROCEDURE — 99215 OFFICE O/P EST HI 40 MIN: CPT | Mod: S$GLB,,, | Performed by: UROLOGY

## 2022-08-19 PROCEDURE — 3078F DIAST BP <80 MM HG: CPT | Mod: CPTII,S$GLB,, | Performed by: UROLOGY

## 2022-08-19 PROCEDURE — 3075F SYST BP GE 130 - 139MM HG: CPT | Mod: CPTII,S$GLB,, | Performed by: UROLOGY

## 2022-08-19 PROCEDURE — 3075F PR MOST RECENT SYSTOLIC BLOOD PRESS GE 130-139MM HG: ICD-10-PCS | Mod: CPTII,S$GLB,, | Performed by: UROLOGY

## 2022-08-19 PROCEDURE — 3078F PR MOST RECENT DIASTOLIC BLOOD PRESSURE < 80 MM HG: ICD-10-PCS | Mod: CPTII,S$GLB,, | Performed by: UROLOGY

## 2022-08-19 PROCEDURE — 99215 PR OFFICE/OUTPT VISIT, EST, LEVL V, 40-54 MIN: ICD-10-PCS | Mod: S$GLB,,, | Performed by: UROLOGY

## 2022-08-19 PROCEDURE — 4010F ACE/ARB THERAPY RXD/TAKEN: CPT | Mod: CPTII,S$GLB,, | Performed by: UROLOGY

## 2022-08-19 PROCEDURE — 3008F BODY MASS INDEX DOCD: CPT | Mod: CPTII,S$GLB,, | Performed by: UROLOGY

## 2022-08-19 PROCEDURE — 4010F PR ACE/ARB THEARPY RXD/TAKEN: ICD-10-PCS | Mod: CPTII,S$GLB,, | Performed by: UROLOGY

## 2022-08-19 PROCEDURE — 1160F PR REVIEW ALL MEDS BY PRESCRIBER/CLIN PHARMACIST DOCUMENTED: ICD-10-PCS | Mod: CPTII,S$GLB,, | Performed by: UROLOGY

## 2022-08-19 PROCEDURE — 99999 PR PBB SHADOW E&M-EST. PATIENT-LVL III: CPT | Mod: PBBFAC,,, | Performed by: UROLOGY

## 2022-08-19 PROCEDURE — 1160F RVW MEDS BY RX/DR IN RCRD: CPT | Mod: CPTII,S$GLB,, | Performed by: UROLOGY

## 2022-08-19 PROCEDURE — 1159F MED LIST DOCD IN RCRD: CPT | Mod: CPTII,S$GLB,, | Performed by: UROLOGY

## 2022-08-19 NOTE — PROGRESS NOTES
[facility] Urology Progress Note    Nelson Wallace is a 62 y.o. male who presents for prostate cancer follow-up status post active surveillance biopsy, with BPH/LUTS component    History intermittency, morning hesitancy and frequency, and some urinary urgency without UUI. In 2017, started flomax, discussed conservative measures for OAB.  Initial symptomatic improvement and inability to tolerate 0.8 mg dose of Flomax secondary to dizziness, but then had symptomatic progression and rising PSA velocity up to 4.0 though reasonable free % by February 2018. PSA remained in the low to mid 4 range through October 2020 with progression of symptoms to AUA SS: 16/3, mixed (3:  Emptying, frequency, intermittency; 2:  Weak stream, straining, sleeping; 1:  Urgency).  PVR 58 cc.  Urgency still very sensitive to bladder irritants, slow spurting intermittent stream with exacerbation of LUTS if he missed Flomax dose for a few days, and if takes it b.i.d. will have retrograde ejaculation and dizziness.  Using Metamucil for baseline constipation.     Cysto/prostate biopsy 11/24/20: psa 4.4 (25% free). Prostate MRI 52g with 0.5cm PIRAD4 lesion at right mid gland peripheral zone.   TRUS VOLUME:  61cc (H 35.8; W 56.2; L 57.9 mm), US discrete round lesion seen right lateral mid gland medially correlating with MRI so targeted on biopsy    CYSTO:  significant lateral lobe obstruction, especially distally, and asymmetric R>L proximally, with mild intravesical extension without median lobe, and mild diffuse trabeculations  PATHOLOGY: 4/17 cores mary lou 6 (3+3=6: 10% RB lateral; 8% RM med, 10% LB lateral, 3% LB medial), sparse prostatitis     Elected to proceed with active surveillance after extensive discussion of management options, and continued Flomax for his LUTS after discussion of continued medical management versus minimally invasive BPH interventions such as Urolift.  He occasionally will take a 0.8 mg dose. Had 2nd opinion consultation at [facility]  Venkata also advised continued active surveillance appropriate  4/2/21 PSA 2.9 and UA negative     June 2021 at time of a few days of R flank pain and dysuria, hematuria. No prior hx stones. Took cipro and incd flomax to BID. As well, his frequency and nocturia incd.   CTU: 2 mm distal right ureteral stone just above the UVJ without hydronephrosis. Prostate hypertrophy measuring 5.7 cm transversely without asymmetry. Symptoms resolved. Presumed passed    Nov 2021. PSA 4.1 on 11/17/21. No new back or bone pain. Stream very poor of late. Feels incomplete emptying bc returning back frequently.  Taking 1 flomax, Selective about the days he takes 2, If good bm, things gets easier with voiding. Not a steady stream. Urgency still bothersome. Still coffee/caffeine  AUA SS 25/3 (5:  Emptying, weak stream; 4:  Intermittency; 3:  Frequency, urgency, straining; 2:  Sleeping) medication helps 5/10    PSA 8/3/22 4.3.  AS confirmatory prostate biopsy 8/12/22  US: trace residual bladder volume with intravesical prostate extension and some early median lobe, normal SVs, more clustered hypeoechoity and/or heterogeneity of right base, and area of lower density right mid posterior peripheral zone correlating with past MRI.   20 core biopsy    PATH:  Cushing pattern (3+4), score 7: RB lateral (75% 2/2 cores, <5% pattern 4)  Leisa pattern (3+3), score 6: RB medial (80% 2/2 cores); LB lateral (20% 1/2 cores); LB medial (5% 1/1 cores)    Did well after biopsy with only 1 day of discomfort.  Hematuria resolving.  Definitely taking 1 flomax, sometimes 2. NTF x2  Labored stream and poor stream - harder to pass at night and sits patiently to empty bladder, and some perineal manipulation to empty  If bathroom is close, no problem. Can hold urine - but urgency is such he wouldn't take an hour cartrip  Baseline ed  Cardiologist: CHICO Castillo. VELVET. Hx cardiac stents        Focused Physical Exam:    Vitals:    08/19/22 1053   BP: 130/74   Pulse:  "(!) 56     Body mass index is 23.09 kg/m². Weight: 73 kg (160 lb 15 oz) Height: 5' 10" (177.8 cm)     Abdomen: Soft, non-tender, nondistended, no CVA tenderness        ASSESSMENT   1. Prostate cancer        2. BPH with obstruction/lower urinary tract symptoms            Plan    I sat with the patient and his wife and reviewed pathology in detail noting progression of low risk to intermediate risk disease. Although only low volume mary lou 4 component of his 3+4 disease, progressing at site of known previous disease which correlates with MRI positive lesion, and increasing in volume.      We extensively reviewed treatment discussions such as risks of progression and continued surveillance, not recommended with progression to Mary Lou 7 disease given his age and health status, the certainly not on pressured timeline to intervene. Robotic prostatectomy was reviewed including the procedure in general including hospital stay and postoperative process were discussed in detail. Risks of surgery were discussed including but not limited to up-front urinary incontinence and erectile dysfunction which we will work to overcome with kegel excercises and any number of ED therapies, versus side effects of radiation which are often minimal and irritative upfront with more troubling complications such as stricture and radiation cystitis occurring late. We also briefly discussed psa monitoring post-treatment and had brief discussion about psa recurrence, noting radiation could be used as salvage therapy after surgery but not often vice versa. We discussed concurrent pelvic lymphadenectomy with surgery, and that sometimes lymph nodes are included in radiation fields dependent on risk. Also discussed concurrent use of ADT with radiation and its side effects such as fatigue, hot flashes, ED. we also discussed a bit about the pitfalls of focal therapy, especially noting treatment is based on imaging, and so ablation only of MRI lesions, " and disease is present bilaterally and areas not just seen on MRI, so disease with continued to be present, and would likely exacerbate his baseline obstructive lower urinary tract symptoms.     Reviewed his patient specific factors such as young age and clinically localized on imaging, and as such would have good surgical outcome.  As well, has severe BPH/LUTS refractory to medical management, and at the expense of the postop incontinence, once recovered, would likely have improved urinary quality of life, of which radiation may exacerbate.  Alternatively the he could have minimally invasive BPH interventions and then proceed with radiotherapy.  Extensive risk benefit discussion about radiation versus surgery compared and contrasted the upfront affects of radical prostatectomy such as incontinence and postprostatectomy erectile dysfunction, and management strategies to rehabilitate and recover from these in the postoperative period; verses the upfront irritative affects of radiation, the quality of life impact of androgen deprivation therapy if utilized, and the impact of the late effects of radiation therapy such as radiation cystitis stricture etc.       After extensive discussion of all management options, answered all questions, and understanding the potential of need for future adjuvant or salvage therapies even in the case of surgical management, understands that given age, overall health and performance status, he has very good recovery potential from minimally invasive prostatectomy, and would likely prefer to proceed in this manner.     We reviewed robotic prostatetcomy procedure in detaill including hospital stay and postoperative recovery. Risks of surgery were discussed including but not limited to urinary incontinence, erectile dysfunction, injury to intraabdominal structures, urine leak, anastamotic leak, rectal injury, damage to ureters, hernia, postoperative ileus. We discussed concurrent pelvic  lymphadenectomy with surgery and it's associated risks.   Discussed implications of treatment in context of his baseline erectile function.  Given his disease profile and his staging imaging, nerve sparing most reasonable though would consider modified nerve spare on right. Reviewed the neurovascular bundle and it investment along the prostate capsule, thus wanting to avoid any contact with capsular positive margin   - extensive discussion on management of post prostatectomy erectile dysfunction including rehabilitative protocols, options for on demand erections, noting oral medications, vacuum erection devices, intracavernosal injections, and ultimate possibility of penile prosthesis implantation should he not recover function on his own. Already utilizing oral medication prn at baseline so recovery goal would be to return to this baseline, though understands may have needed further management of ED in the future post prostatectomy  - as well, reviewed kegel exercises today and technique, and regimen and compliance preoperatively to strengthen his pelvic floor muscles prior to surgery to facilitate postoperative return of continence, as well as being regimented about Kegel exercises postoperatively as the pelvic floor physical therapy that will help him regain his continence.  No present factors that yield worse continence outcomes (such as obesity, smoking, age over 70)     Extensive and productive discussion answering all questions patient and wife had at this time and reviewing in detail surgical management for prostate cancer including all risks and benefits as above, as well as hospital stay, recovery, and follow-up schedule afterwards.  I provided the updated patient guidelines booklet from the [employer]CCN regarding localized prostate cancer, as well as pamphlets about robotic prostatectomy. Will review discussion and be in touch with any follow up questions and treatment planning.  Indicated he would most likely  proceed in the fall or winter around the holiday time.  No problem with this timeline from a disease state.  Would consider restaging imaging with MRI of the prostate/pelvis preoperatively given that last imaging would be greater than 2 years old.  No further staging imaging such as CT or bone scan is indicated given risk category.  As well, in the interim all considering intervention will explore if he is a candidate for prolaris or other genetic testing to indicate possible risks of progression.  Will also contact cardiologist to have updated evaluation and preoperative workup should he proceed with surgery.    Total time spent in/on encounter today, including face to face time with patient, counseling, medical record review, interpretation of tests/results, , and treatment plan coordination: 50 minutes

## 2022-08-24 ENCOUNTER — PATIENT MESSAGE (OUTPATIENT)
Dept: CARDIOLOGY | Facility: CLINIC | Age: 62
End: 2022-08-24
Payer: COMMERCIAL

## 2022-08-26 ENCOUNTER — HOSPITAL ENCOUNTER (OUTPATIENT)
Dept: RADIOLOGY | Facility: HOSPITAL | Age: 62
Discharge: HOME | End: 2022-08-26
Attending: INTERNAL MEDICINE
Payer: COMMERCIAL

## 2022-08-26 ENCOUNTER — OFFICE VISIT (OUTPATIENT)
Dept: CARDIOLOGY | Facility: CLINIC | Age: 62
End: 2022-08-26
Payer: COMMERCIAL

## 2022-08-26 ENCOUNTER — LAB VISIT (OUTPATIENT)
Dept: LAB | Facility: HOSPITAL | Age: 62
End: 2022-08-26
Attending: INTERNAL MEDICINE
Payer: COMMERCIAL

## 2022-08-26 VITALS
OXYGEN SATURATION: 98 % | BODY MASS INDEX: 22.76 KG/M2 | RESPIRATION RATE: 16 BRPM | HEIGHT: 70 IN | DIASTOLIC BLOOD PRESSURE: 68 MMHG | WEIGHT: 159 LBS | SYSTOLIC BLOOD PRESSURE: 126 MMHG | HEART RATE: 48 BPM

## 2022-08-26 DIAGNOSIS — Z01.818 PREOPERATIVE EVALUATION OF A MEDICAL CONDITION TO RULE OUT SURGICAL CONTRAINDICATIONS (TAR REQUIRED): ICD-10-CM

## 2022-08-26 DIAGNOSIS — I25.10 CORONARY ARTERY DISEASE INVOLVING NATIVE CORONARY ARTERY OF NATIVE HEART WITHOUT ANGINA PECTORIS: ICD-10-CM

## 2022-08-26 DIAGNOSIS — E02 SUBCLINICAL IODINE-DEFICIENCY HYPOTHYROIDISM: ICD-10-CM

## 2022-08-26 DIAGNOSIS — E78.00 PURE HYPERCHOLESTEROLEMIA: ICD-10-CM

## 2022-08-26 DIAGNOSIS — I10 PRIMARY HYPERTENSION: ICD-10-CM

## 2022-08-26 DIAGNOSIS — I35.1 AORTIC EJECTION MURMUR: ICD-10-CM

## 2022-08-26 DIAGNOSIS — R97.20 ELEVATED PSA: ICD-10-CM

## 2022-08-26 LAB
ALBUMIN SERPL BCP-MCNC: 4.7 G/DL (ref 3.5–5.2)
ALP SERPL-CCNC: 54 U/L (ref 55–135)
ALT SERPL W/O P-5'-P-CCNC: 26 U/L (ref 10–44)
ANION GAP SERPL CALC-SCNC: 6 MMOL/L (ref 8–16)
AST SERPL-CCNC: 22 U/L (ref 10–40)
BILIRUB SERPL-MCNC: 1.6 MG/DL (ref 0.1–1)
BUN SERPL-MCNC: 12 MG/DL (ref 8–23)
CALCIUM SERPL-MCNC: 9.1 MG/DL (ref 8.7–10.5)
CHLORIDE SERPL-SCNC: 104 MMOL/L (ref 95–110)
CO2 SERPL-SCNC: 27 MMOL/L (ref 23–29)
CREAT SERPL-MCNC: 0.8 MG/DL (ref 0.5–1.4)
ERYTHROCYTE [DISTWIDTH] IN BLOOD BY AUTOMATED COUNT: 12.1 % (ref 11.5–14.5)
EST. GFR  (NO RACE VARIABLE): >60 ML/MIN/1.73 M^2
GLUCOSE SERPL-MCNC: 106 MG/DL (ref 70–110)
HCT VFR BLD AUTO: 41.5 % (ref 40–54)
HGB BLD-MCNC: 14.3 G/DL (ref 14–18)
MAGNESIUM SERPL-MCNC: 2.1 MG/DL (ref 1.6–2.6)
MCH RBC QN AUTO: 29.9 PG (ref 27–31)
MCHC RBC AUTO-ENTMCNC: 34.5 G/DL (ref 32–36)
MCV RBC AUTO: 87 FL (ref 82–98)
PLATELET # BLD AUTO: 159 K/UL (ref 150–450)
PMV BLD AUTO: 11.1 FL (ref 9.2–12.9)
POTASSIUM SERPL-SCNC: 4.1 MMOL/L (ref 3.5–5.1)
PROT SERPL-MCNC: 7.6 G/DL (ref 6–8.4)
RBC # BLD AUTO: 4.79 M/UL (ref 4.6–6.2)
SODIUM SERPL-SCNC: 137 MMOL/L (ref 136–145)
TSH SERPL DL<=0.005 MIU/L-ACNC: 2.37 UIU/ML (ref 0.34–5.6)
WBC # BLD AUTO: 8.05 K/UL (ref 3.9–12.7)

## 2022-08-26 PROCEDURE — 85027 COMPLETE CBC AUTOMATED: CPT | Performed by: INTERNAL MEDICINE

## 2022-08-26 PROCEDURE — 4010F ACE/ARB THERAPY RXD/TAKEN: CPT | Mod: CPTII,S$GLB,, | Performed by: INTERNAL MEDICINE

## 2022-08-26 PROCEDURE — 3074F SYST BP LT 130 MM HG: CPT | Mod: CPTII,S$GLB,, | Performed by: INTERNAL MEDICINE

## 2022-08-26 PROCEDURE — 83735 ASSAY OF MAGNESIUM: CPT | Performed by: INTERNAL MEDICINE

## 2022-08-26 PROCEDURE — 3078F DIAST BP <80 MM HG: CPT | Mod: CPTII,S$GLB,, | Performed by: INTERNAL MEDICINE

## 2022-08-26 PROCEDURE — 4010F PR ACE/ARB THEARPY RXD/TAKEN: ICD-10-PCS | Mod: CPTII,S$GLB,, | Performed by: INTERNAL MEDICINE

## 2022-08-26 PROCEDURE — 1160F PR REVIEW ALL MEDS BY PRESCRIBER/CLIN PHARMACIST DOCUMENTED: ICD-10-PCS | Mod: CPTII,S$GLB,, | Performed by: INTERNAL MEDICINE

## 2022-08-26 PROCEDURE — 1159F PR MEDICATION LIST DOCUMENTED IN MEDICAL RECORD: ICD-10-PCS | Mod: CPTII,S$GLB,, | Performed by: INTERNAL MEDICINE

## 2022-08-26 PROCEDURE — 3008F BODY MASS INDEX DOCD: CPT | Mod: CPTII,S$GLB,, | Performed by: INTERNAL MEDICINE

## 2022-08-26 PROCEDURE — 36415 COLL VENOUS BLD VENIPUNCTURE: CPT | Performed by: INTERNAL MEDICINE

## 2022-08-26 PROCEDURE — 99214 OFFICE O/P EST MOD 30 MIN: CPT | Mod: S$GLB,,, | Performed by: INTERNAL MEDICINE

## 2022-08-26 PROCEDURE — 1159F MED LIST DOCD IN RCRD: CPT | Mod: CPTII,S$GLB,, | Performed by: INTERNAL MEDICINE

## 2022-08-26 PROCEDURE — 1160F RVW MEDS BY RX/DR IN RCRD: CPT | Mod: CPTII,S$GLB,, | Performed by: INTERNAL MEDICINE

## 2022-08-26 PROCEDURE — 71046 X-RAY EXAM CHEST 2 VIEWS: CPT | Mod: TC

## 2022-08-26 PROCEDURE — 93000 ELECTROCARDIOGRAM COMPLETE: CPT | Mod: S$GLB,,, | Performed by: INTERNAL MEDICINE

## 2022-08-26 PROCEDURE — 3078F PR MOST RECENT DIASTOLIC BLOOD PRESSURE < 80 MM HG: ICD-10-PCS | Mod: CPTII,S$GLB,, | Performed by: INTERNAL MEDICINE

## 2022-08-26 PROCEDURE — 3074F PR MOST RECENT SYSTOLIC BLOOD PRESSURE < 130 MM HG: ICD-10-PCS | Mod: CPTII,S$GLB,, | Performed by: INTERNAL MEDICINE

## 2022-08-26 PROCEDURE — 3008F PR BODY MASS INDEX (BMI) DOCUMENTED: ICD-10-PCS | Mod: CPTII,S$GLB,, | Performed by: INTERNAL MEDICINE

## 2022-08-26 PROCEDURE — 93000 EKG 12-LEAD: ICD-10-PCS | Mod: S$GLB,,, | Performed by: INTERNAL MEDICINE

## 2022-08-26 PROCEDURE — 84443 ASSAY THYROID STIM HORMONE: CPT | Performed by: INTERNAL MEDICINE

## 2022-08-26 PROCEDURE — 99214 PR OFFICE/OUTPT VISIT, EST, LEVL IV, 30-39 MIN: ICD-10-PCS | Mod: S$GLB,,, | Performed by: INTERNAL MEDICINE

## 2022-08-26 PROCEDURE — 80053 COMPREHEN METABOLIC PANEL: CPT | Performed by: INTERNAL MEDICINE

## 2022-08-26 RX ORDER — EPINEPHRINE 0.22MG
200 AEROSOL WITH ADAPTER (ML) INHALATION DAILY
COMMUNITY

## 2022-08-26 NOTE — PROGRESS NOTES
Subjective:    Patient ID:  Nelson Wallace is a 62 y.o. male patient here for evaluation Hypertension, Hyperlipidemia, and surgical clearance (He will need a prostate clearance, he is looking for a second opinion. 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  He is doing well from cardiac standpoint good effort capacity is noted he is exercising for about 30 minute duration and cardiovascular conditioning exercises which is able tolerate very well.  Denies having episodes of angina no significant shortness of breath is noted.  He underwent prostate biopsies and some suspicious for prostate cancer and recommended consider our robotic prostatectomy  He has some residual hematuria and some nocturia still persisting.  But no fevers or chills no arm dysuria noted.        Review of patient's allergies indicates:  No Known Allergies    Past Medical History:   Diagnosis Date    Allergy     Atherosclerosis of native coronary artery of native heart without angina pectoris     Elevated blood pressure     Elevated PSA     GERD (gastroesophageal reflux disease)     Hyperlipemia     Hypertension     Hypothyroidism     Prostate cancer      Past Surgical History:   Procedure Laterality Date    CORONARY ANGIOPLASTY WITH STENT PLACEMENT      CYSTOSCOPY N/A 11/24/2020    Procedure: CYSTOSCOPY;  Surgeon: Gilmer Daly MD;  Location: [facility] OR;  Service: Urology;  Laterality: N/A;    TRANSRECTAL BIOPSY OF PROSTATE WITH ULTRASOUND GUIDANCE N/A 11/24/2020    Procedure: BIOPSY, PROSTATE, RECTAL APPROACH, WITH US GUIDANCE;  Surgeon: Gilmer Daly MD;  Location: [facility] OR;  Service: Urology;  Laterality: N/A;     Social History     Tobacco Use    Smoking status: Never Smoker    Smokeless tobacco: Never Used   Substance Use Topics     Alcohol use: No    Drug use: No        Review of Systems:    As noted in HPI in addition      REVIEW OF SYSTEMS  CARDIOVASCULAR: No recent chest pain, palpitations, arm, neck, or jaw pain  RESPIRATORY: No recent fever, cough chills, SOB or congestion  : No blood in the urine  GI: No Nausea, vomiting, constipation, diarrhea, blood, or reflux.  MUSCULOSKELETAL: No myalgias  NEURO: No lightheadedness or dizziness  EYES: No Double vision, blurry, vision or headache              Objective        Vitals:    08/26/22 1151   BP: 126/68   Pulse: (!) 48   Resp: 16       LIPIDS - LAST 2   Lab Results   Component Value Date    CHOL 145 04/02/2021    CHOL 140 02/01/2020    HDL 47 04/02/2021    HDL 45 02/01/2020    LDLCALC 80.8 04/02/2021    LDLCALC 78.6 02/01/2020    TRIG 86 04/02/2021    TRIG 82 02/01/2020    CHOLHDL 32.4 04/02/2021    CHOLHDL 32.1 02/01/2020       CBC - LAST 2  Lab Results   Component Value Date    WBC 8.53 06/01/2021    WBC 7.29 02/01/2020    RBC 4.87 06/01/2021    RBC 4.88 02/01/2020    HGB 14.4 06/01/2021    HGB 14.1 02/01/2020    HCT 42.5 06/01/2021    HCT 41.7 02/01/2020    MCV 87 06/01/2021    MCV 86 02/01/2020    MCH 29.6 06/01/2021    MCH 28.9 02/01/2020    MCHC 33.9 06/01/2021    MCHC 33.8 02/01/2020    RDW 12.0 06/01/2021    RDW 11.8 02/01/2020     06/01/2021     02/01/2020    MPV 11.2 06/01/2021    MPV 11.1 02/01/2020    GRAN 4.7 06/01/2021    GRAN 55.4 06/01/2021    LYMPH 2.9 06/01/2021    LYMPH 33.5 06/01/2021    MONO 0.5 06/01/2021    MONO 5.5 06/01/2021    BASO 0.06 06/01/2021    BASO 0.03 02/01/2020    NRBC 0 06/01/2021    NRBC 0 02/01/2020       CHEMISTRY & LIVER FUNCTION - LAST 2  Lab Results   Component Value Date     06/01/2021     04/02/2021    K 4.7 06/01/2021    K 4.4 04/02/2021     06/01/2021     04/02/2021    CO2 27 06/01/2021    CO2 26 04/02/2021    ANIONGAP 9 06/01/2021    ANIONGAP 8 04/02/2021    BUN 15 06/01/2021    BUN 15 04/02/2021     CREATININE 0.9 06/01/2021    CREATININE 0.8 04/02/2021     (H) 06/01/2021     (H) 04/02/2021    CALCIUM 9.4 06/01/2021    CALCIUM 9.4 04/02/2021    MG 2.1 07/26/2019    ALBUMIN 4.5 04/02/2021    ALBUMIN 4.2 02/01/2020    PROT 7.3 04/02/2021    PROT 7.1 02/01/2020    ALKPHOS 51 (L) 04/02/2021    ALKPHOS 47 (L) 02/01/2020    ALT 29 04/02/2021    ALT 27 02/01/2020    AST 22 04/02/2021    AST 22 02/01/2020    BILITOT 1.6 (H) 04/02/2021    BILITOT 1.2 (H) 02/01/2020        CARDIAC PROFILE - LAST 2  No results found for: BNP, CPK, CPKMB, LDH, TROPONINI     COAGULATION - LAST 2  No results found for: LABPT, INR, APTT    ENDOCRINE & PSA - LAST 2  Lab Results   Component Value Date    HGBA1C 5.8 02/01/2020    TSH 2.450 04/02/2021    TSH 1.22 07/26/2019    PSA 3.3 (H) 04/06/2017        ECHOCARDIOGRAM RESULTS  Results for orders placed during the hospital encounter of 02/11/22    Echo    Interpretation Summary  · The left ventricle is normal in size with mild concentric hypertrophy and normal systolic function.  · The estimated ejection fraction is 65%.  · Normal left ventricular diastolic function.  · Normal right ventricular size with normal right ventricular systolic function.  · Mild mitral regurgitation.  · Normal central venous pressure (3 mmHg).  · The estimated PA systolic pressure is 23 mmHg.      CURRENT/PREVIOUS VISIT EKG  Results for orders placed or performed in visit on 12/16/21   IN OFFICE EKG 12-LEAD (to [place])    Collection Time: 12/16/21  4:28 PM    Narrative    Test Reason : I10,    Vent. Rate : 051 BPM     Atrial Rate : 051 BPM     P-R Int : 140 ms          QRS Dur : 080 ms      QT Int : 436 ms       P-R-T Axes : 059 026 057 degrees     QTc Int : 401 ms    Sinus bradycardia with sinus arrhythmia  Possible Anterior infarct ,age undetermined  Abnormal ECG  No previous ECGs available  Confirmed by Moshe Castillo MD ([id number]) on 1/9/2022 9:03:29 PM    Referred By:             Confirmed By:Moshe  Anna FLORES     No valid procedures specified.   Results for orders placed during the hospital encounter of 02/11/22    Nuclear Stress - Cardiology Interpreted    Interpretation Summary    Normal myocardial perfusion scan. There is no evidence of myocardial ischemia or infarction.    The gated perfusion images showed an ejection fraction of 70% post stress. Normal ejection fraction is greater than 53%.    There is normal wall motion post stress.    LV cavity size is  and normal at stress.    The EKG portion of this study is positive for ischemia.    The patient reported chest pain during the stress test.    No valid procedures specified.    PHYSICAL EXAM  CONSTITUTIONAL: Well built, well nourished in no apparent distress  NECK: no carotid bruit, no JVD  LUNGS: CTA  CHEST WALL: no tenderness  HEART: regular rate and rhythm, S1, S2 normal, no murmur, click, rub or gallop   ABDOMEN: soft, non-tender; bowel sounds normal; no masses,  no organomegaly  EXTREMITIES: Extremities normal, no edema, no calf tenderness noted  NEURO: AAO X 3    I HAVE REVIEWED :    The vital signs, nurses notes, and all the pertinent radiology and labs.        Current Outpatient Medications   Medication Instructions    aspirin (ECOTRIN) 81 mg, Oral, Daily    cetirizine (ZYRTEC) 10 MG tablet as needed.    coenzyme Q10 200 mg, Oral, Daily    esomeprazole (NEXIUM) 20 mg, Oral, Daily PRN    isosorbide mononitrate (IMDUR) 30 mg, Oral, Daily    levothyroxine (SYNTHROID) 88 MCG tablet TAKE 1 TABLET BY MOUTH ONCE A DAY BEFORE BREAKFAST    losartan (COZAAR) 50 MG tablet Take 1 tablet by mouth once daily.    rosuvastatin (CRESTOR) 20 MG tablet Take 1 tablet by mouth every day or as directed.    rosuvastatin (CRESTOR) 15 mg, Oral, Nightly    tamsulosin (FLOMAX) 0.8 mg, Oral, Daily          Assessment & Plan     Coronary artery disease without angina pectoris  His doing very well from cardiac standpoint.  His exercise EKG showed some  abnormality but myocardial perfusion study did not show any scintigraphic uptake suggest ischemia.  Therefore continue on optimize medical therapy and he remains symptom-free at this time   Continue on isosorbide mononitrate 30 mg daily, continue on risk factor modification with Crestor at 20 mg at bedtime and also on Co Q10    Hypertension  Blood pressure is very well controlled 126/68 mm Hg continue on present therapy to include losartan 50 once a day along with isosorbide mononitrate 30 daily continue to maintain low-salt    Hyperlipemia  Maintain on Crestor at 20 mg daily monitor liver profile about 2-3 months time.    Aortic ejection murmur  He has and I aortic valve ejection murmur.  Pretty much unchanged from baseline continue to monitor    Elevated PSA  Currently under active investigation and potential  robotic surgery is contemplated.    Hypothyroidism  Maintain on levothyroxine 88 mcg a day.  His last TSH was 2.45    Preoperative evaluation of a medical condition to rule out surgical contraindications (TAR required)  He is an acceptable cardiovascular risk for planned surgical intervention.  He has remained stable from cardiac standpoint advised to withhold aspirin for about 5-7 days before surgery continue on other medications as is.  Restarting medications abated with aspirin arm based on surgical intervention and response to therapy          No follow-ups on file.

## 2022-08-26 NOTE — ASSESSMENT & PLAN NOTE
Blood pressure is very well controlled 126/68 mm Hg continue on present therapy to include losartan 50 once a day along with isosorbide mononitrate 30 daily continue to maintain low-salt

## 2022-08-26 NOTE — ASSESSMENT & PLAN NOTE
He is an acceptable cardiovascular risk for planned surgical intervention.  He has remained stable from cardiac standpoint advised to withhold aspirin for about 5-7 days before surgery continue on other medications as is.  Restarting medications abated with aspirin arm based on surgical intervention and response to therapy

## 2022-08-26 NOTE — ASSESSMENT & PLAN NOTE
He has and I aortic valve ejection murmur.  Pretty much unchanged from baseline continue to monitor

## 2022-08-26 NOTE — LETTER
September 19, 2022          No Recipients             [facility] - Cardiology  [address] [place] [address]  Phone: [phone]  Fax: [phone]   Patient: Nelson Wallace   MR Number: [id number]   YOB: 1960   Date of Visit: 8/26/2022       Dear Dr. Aceves Recipients:    Thank you for referring Nelson Wallace to me for evaluation. Attached you will find relevant portions of my assessment and plan of care.    If you have questions, please do not hesitate to call me. I look forward to following Nelson Wallace along with you.    Sincerely,      Moshe Castillo MD            CC    No Recipients    Enclosure

## 2022-08-27 ENCOUNTER — CLINICAL SUPPORT (OUTPATIENT)
Dept: OTHER | Facility: CLINIC | Age: 62
End: 2022-08-27
Payer: COMMERCIAL

## 2022-08-27 DIAGNOSIS — Z00.8 ENCOUNTER FOR OTHER GENERAL EXAMINATION: ICD-10-CM

## 2022-08-27 PROCEDURE — 82947 ASSAY GLUCOSE BLOOD QUANT: CPT | Mod: QW,S$GLB,, | Performed by: INTERNAL MEDICINE

## 2022-08-27 PROCEDURE — 80061 LIPID PANEL: CPT | Mod: QW,S$GLB,, | Performed by: INTERNAL MEDICINE

## 2022-08-27 PROCEDURE — 80061 PR  LIPID PANEL: ICD-10-PCS | Mod: QW,S$GLB,, | Performed by: INTERNAL MEDICINE

## 2022-08-27 PROCEDURE — 82947 PR  ASSAY QUANTITATIVE,BLOOD GLUCOSE: ICD-10-PCS | Mod: QW,S$GLB,, | Performed by: INTERNAL MEDICINE

## 2022-08-27 PROCEDURE — 99401 PR PREVENT COUNSEL,INDIV,15 MIN: ICD-10-PCS | Mod: S$GLB,,, | Performed by: INTERNAL MEDICINE

## 2022-08-27 PROCEDURE — 99401 PREV MED CNSL INDIV APPRX 15: CPT | Mod: S$GLB,,, | Performed by: INTERNAL MEDICINE

## 2022-08-28 VITALS
WEIGHT: 159 LBS | BODY MASS INDEX: 22.76 KG/M2 | HEIGHT: 70 IN | DIASTOLIC BLOOD PRESSURE: 56 MMHG | SYSTOLIC BLOOD PRESSURE: 101 MMHG

## 2022-08-28 LAB
HDLC SERPL-MCNC: 36 MG/DL
POC CHOLESTEROL, LDL (DOCK): 50 MG/DL
POC CHOLESTEROL, TOTAL: 104 MG/DL
POC GLUCOSE, FASTING: 106 MG/DL (ref 60–110)
TRIGL SERPL-MCNC: 90 MG/DL

## 2022-09-12 ENCOUNTER — TELEPHONE (OUTPATIENT)
Dept: CARDIOLOGY | Facility: CLINIC | Age: 62
End: 2022-09-12
Payer: COMMERCIAL

## 2022-09-12 NOTE — LETTER
2022    Nelson Wallace  [address] [place] [address]             [facility] - Cardiology  [address]  [place] [address]  Phone: [phone]  Fax: [phone] Patient: Nelson Wallace  : 1960  Referring Doctor: Dr. Curtis  Procedure: Robotic Prostatectomy     Current Outpatient Medications   Medication Sig    aspirin (ECOTRIN) 81 MG EC tablet Take 81 mg by mouth once daily.    cetirizine (ZYRTEC) 10 MG tablet as needed.    coenzyme Q10 100 mg capsule Take 200 mg by mouth once daily.    esomeprazole (NEXIUM) 20 MG capsule Take 20 mg by mouth daily as needed.     isosorbide mononitrate (IMDUR) 30 MG 24 hr tablet Take 1 tablet (30 mg total) by mouth once daily.    levothyroxine (SYNTHROID) 88 MCG tablet TAKE 1 TABLET BY MOUTH ONCE A DAY BEFORE BREAKFAST    losartan (COZAAR) 50 MG tablet Take 1 tablet by mouth once daily. (Patient taking differently: Take 1 tablet by mouth once daily.)    rosuvastatin (CRESTOR) 10 MG tablet Take 1.5 tablets (15 mg total) by mouth every evening. (Patient not taking: No sig reported)    rosuvastatin (CRESTOR) 20 MG tablet Take 1 tablet by mouth every day or as directed.    tamsulosin (FLOMAX) 0.4 mg Cap Take 2 capsules (0.8 mg total) by mouth once daily.     No current facility-administered medications for this visit.       This patient has been assessed for risk factors for clearance of surgery with the following stipulations:    __x_ No contraindications  __x   Recommendations for Aspirin, hold x 3__ days  _x__ Cleared for surgery with moderate risks      If you have any questions regarding the above, please contact my office at [phone].    Sincerely,

## 2022-09-12 NOTE — TELEPHONE ENCOUNTER
----- Message from Yisel Spaulding sent at 9/12/2022 12:46 PM CDT -----  Regarding: Medical Clearance  Patient came in and dropped of information for his PCP, I placed the paper in the box.   Patient said please send the Medical Clearance Dr. Nelson Curtis M.D. (his  Has the same first name)

## 2022-09-12 NOTE — TELEPHONE ENCOUNTER
Dr. Nelson Curtis  [phone]  Fax [phone]  Robotic Prostatectomy  clearance placed in communications    He is aware Dr. Castillo is out and will wait for him to sign

## 2022-09-21 RX ORDER — LOSARTAN POTASSIUM 50 MG/1
TABLET ORAL
Qty: 90 TABLET | Refills: 2 | Status: SHIPPED | OUTPATIENT
Start: 2022-09-21 | End: 2023-08-11 | Stop reason: SDUPTHER

## 2022-09-22 ENCOUNTER — TELEPHONE (OUTPATIENT)
Dept: CARDIOLOGY | Facility: CLINIC | Age: 62
End: 2022-09-22
Payer: COMMERCIAL

## 2022-09-22 NOTE — TELEPHONE ENCOUNTER
----- Message from Concepcion Fung sent at 9/22/2022  3:33 PM CDT -----  Contact: Marycarmen  Type: Needs Medical Advice  Who Called: Marycarmen with [facility] in [place]   Symptoms (please be specific):   How long has patient had these symptoms:    Pharmacy name and phone #:    Best Call Back Number: Fax# [phone], [phone] office # until 6:00pm  Additional Information: Requesting a call back with last year and this year Echo and Stress test results, pt is having a procedure on tomorrow @5:30 in the morning, they already have his clearance.

## 2022-10-20 RX ORDER — MIRABEGRON 50 MG/1
1 TABLET, FILM COATED, EXTENDED RELEASE ORAL DAILY
Qty: 30 TABLET | Refills: 3 | Status: SHIPPED | OUTPATIENT
Start: 2022-10-20 | End: 2023-03-17

## 2023-01-27 ENCOUNTER — LAB VISIT (OUTPATIENT)
Dept: LAB | Facility: HOSPITAL | Age: 63
End: 2023-01-27
Payer: COMMERCIAL

## 2023-01-27 DIAGNOSIS — C61 PROSTATE CANCER: Primary | ICD-10-CM

## 2023-01-27 LAB — COMPLEXED PSA SERPL-MCNC: <0.01 NG/ML (ref 0–4)

## 2023-01-27 PROCEDURE — 36415 COLL VENOUS BLD VENIPUNCTURE: CPT

## 2023-01-27 PROCEDURE — 84153 ASSAY OF PSA TOTAL: CPT

## 2023-03-17 ENCOUNTER — OFFICE VISIT (OUTPATIENT)
Dept: FAMILY MEDICINE | Facility: CLINIC | Age: 63
End: 2023-03-17
Payer: COMMERCIAL

## 2023-03-17 VITALS
HEIGHT: 70 IN | OXYGEN SATURATION: 99 % | HEART RATE: 56 BPM | BODY MASS INDEX: 24.62 KG/M2 | SYSTOLIC BLOOD PRESSURE: 155 MMHG | WEIGHT: 172 LBS | DIASTOLIC BLOOD PRESSURE: 70 MMHG

## 2023-03-17 DIAGNOSIS — Z12.5 PROSTATE CANCER SCREENING: ICD-10-CM

## 2023-03-17 DIAGNOSIS — Z12.11 COLON CANCER SCREENING: ICD-10-CM

## 2023-03-17 DIAGNOSIS — E02 SUBCLINICAL IODINE-DEFICIENCY HYPOTHYROIDISM: ICD-10-CM

## 2023-03-17 DIAGNOSIS — Z00.00 WELL ADULT EXAM: Primary | ICD-10-CM

## 2023-03-17 DIAGNOSIS — I10 PRIMARY HYPERTENSION: ICD-10-CM

## 2023-03-17 PROCEDURE — 99396 PR PREVENTIVE VISIT,EST,40-64: ICD-10-PCS | Mod: S$GLB,,, | Performed by: FAMILY MEDICINE

## 2023-03-17 PROCEDURE — 99396 PREV VISIT EST AGE 40-64: CPT | Mod: S$GLB,,, | Performed by: FAMILY MEDICINE

## 2023-03-17 PROCEDURE — 1159F MED LIST DOCD IN RCRD: CPT | Mod: CPTII,S$GLB,, | Performed by: FAMILY MEDICINE

## 2023-03-17 PROCEDURE — 4010F ACE/ARB THERAPY RXD/TAKEN: CPT | Mod: CPTII,S$GLB,, | Performed by: FAMILY MEDICINE

## 2023-03-17 PROCEDURE — 3077F SYST BP >= 140 MM HG: CPT | Mod: CPTII,S$GLB,, | Performed by: FAMILY MEDICINE

## 2023-03-17 PROCEDURE — 1159F PR MEDICATION LIST DOCUMENTED IN MEDICAL RECORD: ICD-10-PCS | Mod: CPTII,S$GLB,, | Performed by: FAMILY MEDICINE

## 2023-03-17 PROCEDURE — 3077F PR MOST RECENT SYSTOLIC BLOOD PRESSURE >= 140 MM HG: ICD-10-PCS | Mod: CPTII,S$GLB,, | Performed by: FAMILY MEDICINE

## 2023-03-17 PROCEDURE — 4010F PR ACE/ARB THEARPY RXD/TAKEN: ICD-10-PCS | Mod: CPTII,S$GLB,, | Performed by: FAMILY MEDICINE

## 2023-03-17 PROCEDURE — 3008F PR BODY MASS INDEX (BMI) DOCUMENTED: ICD-10-PCS | Mod: CPTII,S$GLB,, | Performed by: FAMILY MEDICINE

## 2023-03-17 PROCEDURE — 3078F DIAST BP <80 MM HG: CPT | Mod: CPTII,S$GLB,, | Performed by: FAMILY MEDICINE

## 2023-03-17 PROCEDURE — 3008F BODY MASS INDEX DOCD: CPT | Mod: CPTII,S$GLB,, | Performed by: FAMILY MEDICINE

## 2023-03-17 PROCEDURE — 3078F PR MOST RECENT DIASTOLIC BLOOD PRESSURE < 80 MM HG: ICD-10-PCS | Mod: CPTII,S$GLB,, | Performed by: FAMILY MEDICINE

## 2023-03-17 RX ORDER — ASPIRIN 81 MG/1
81 TABLET ORAL DAILY
Qty: 90 TABLET | Refills: 3 | Status: SHIPPED | OUTPATIENT
Start: 2023-03-17 | End: 2024-03-16

## 2023-03-17 NOTE — PROGRESS NOTES
SUBJECTIVE:    Patient ID: Nelson Wallace is a 62 y.o. male.    Chief Complaint: Annual Exam  62-year-old male returns to clinic for his annual exam, he is doing well he has no new complaints. In the past year Dr Wallace, has had his prostate removed, he is doing well some urinary leakage with valsalva. 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      I reviewed his overdue health maintenance patient is due for pneumococcal vaccine, HIV screening, shingles vaccine, aspirin platelet therapy, COVID-19 vaccine booster.      Past medical history  Hypertension: Losartan 50mg, Isosorbide 30mg ( not taking)   Hyperlipidemia:  Crestor 20 mg  Hypothyroid:  Levothyroxine 88 mcg  Coronary artery disease with stents:  Aspirin 81 mg  BPH:  Flomax 0.4 mg  Prostate cancer Leisa score 6:  Followed with active surveillance.  Last PSA 4.4  TRANG: Zyrtec occasionally        Specialist  Cardiology Dr. Donahue  Urology: [facility]     Smoke: None  ETOH: 1-2 drinks a year  Exercise: Regularly     HPI      Past Medical History:   Diagnosis Date    Allergy     Atherosclerosis of native coronary artery of native heart without angina pectoris     Elevated blood pressure     Elevated PSA     GERD (gastroesophageal reflux disease)     Hyperlipemia     Hypertension     Hypothyroidism     Prostate cancer      Social History     Socioeconomic History    Marital status:    Occupational History     Employer: [employer]   Tobacco Use    Smoking status: Never    Smokeless tobacco: Never   Substance and Sexual Activity    Alcohol use: No    Drug use: No    Sexual activity: Yes     Past Surgical History:   Procedure Laterality Date    CORONARY ANGIOPLASTY WITH STENT PLACEMENT      CYSTOSCOPY N/A 11/24/2020    Procedure: CYSTOSCOPY;  Surgeon: Gilmer Daly MD;  [facility];  Service: Urology;  Laterality: N/A;    TRANSRECTAL BIOPSY OF PROSTATE WITH  "ULTRASOUND GUIDANCE N/A 11/24/2020    Procedure: BIOPSY, PROSTATE, RECTAL APPROACH, WITH US GUIDANCE;  Surgeon: Gilmer Daly MD;  Location: [facility] OR;  Service: Urology;  Laterality: N/A;     Family History   Problem Relation Age of Onset    Osteoporosis Mother     Hypertension Mother     Hypertension Father     Hypertension Brother      Current Outpatient Medications   Medication Sig Dispense Refill    aspirin (ECOTRIN) 81 MG EC tablet Take 81 mg by mouth once daily.      coenzyme Q10 100 mg capsule Take 200 mg by mouth once daily.      isosorbide mononitrate (IMDUR) 30 MG 24 hr tablet Take 1 tablet (30 mg total) by mouth once daily. 90 tablet 3    levothyroxine (SYNTHROID) 88 MCG tablet TAKE 1 TABLET BY MOUTH ONCE A DAY BEFORE BREAKFAST 90 tablet 3    losartan (COZAAR) 50 MG tablet Take 1 tablet by mouth once daily. 90 tablet 2    rosuvastatin (CRESTOR) 20 MG tablet TAKE 1 TABLET BY MOUTH ONCE DAILY 90 tablet 11    aspirin (ECOTRIN) 81 MG EC tablet Take 1 tablet (81 mg total) by mouth once daily. 90 tablet 3     No current facility-administered medications for this visit.     Review of patient's allergies indicates:  No Known Allergies    Review of Systems   Constitutional:  Negative for activity change, appetite change, diaphoresis, fatigue, fever and unexpected weight change.   HENT:  Negative for congestion, postnasal drip, rhinorrhea, sinus pressure and sinus pain.    Respiratory:  Negative for cough, chest tightness, shortness of breath and wheezing.    Cardiovascular:  Negative for chest pain and palpitations.   Gastrointestinal:  Negative for abdominal distention, abdominal pain, anal bleeding, blood in stool, constipation and diarrhea.   Genitourinary:  Negative for dysuria, flank pain and hematuria.        Blood pressure (!) 155/70, pulse (!) 56, height 5' 10" (1.778 m), weight 78 kg (172 lb), SpO2 99 %. Body mass index is 24.68 kg/m².   Objective:      Physical Exam  Vitals reviewed. "   Constitutional:       General: He is not in acute distress.     Appearance: Normal appearance. He is not ill-appearing or toxic-appearing.   HENT:      Head: Normocephalic and atraumatic.      Right Ear: Tympanic membrane normal.      Left Ear: Tympanic membrane normal.      Nose: Nose normal. No congestion or rhinorrhea.   Cardiovascular:      Rate and Rhythm: Normal rate and regular rhythm.      Heart sounds: Murmur heard.   Pulmonary:      Effort: No respiratory distress.      Breath sounds: Normal breath sounds. No wheezing or rhonchi.   Musculoskeletal:      Right lower leg: No edema.      Left lower leg: No edema.   Skin:     General: Skin is warm and dry.   Neurological:      Mental Status: He is alert and oriented to person, place, and time.           Assessment:       1. Well adult exam    2. Colon cancer screening    3. Prostate cancer screening    4. Subclinical iodine-deficiency hypothyroidism    5. Primary hypertension         Plan:           Well adult exam  -     Lipid Panel; Future; Expected date: 03/17/2023  -     Comprehensive Metabolic Panel; Future; Expected date: 03/17/2023  Patient counseled on age appropriate medical preventive services, age appropriate cancer screenings, nutrition, healthy diet, consistent exercise regimen and maintaining an active lifestyle.   Colon cancer screening  -     Ambulatory referral/consult to Gastroenterology; Future; Expected date: 03/24/2023    Prostate cancer screening  -     PSA, Screening; Future; Expected date: 03/17/2023    Subclinical iodine-deficiency hypothyroidism  -     TSH; Future; Expected date: 03/17/2023    Primary hypertension  -     Magnesium; Future; Expected date: 03/17/2023    Other orders  -     aspirin (ECOTRIN) 81 MG EC tablet; Take 1 tablet (81 mg total) by mouth once daily.  Dispense: 90 tablet; Refill: 3

## 2023-07-19 DIAGNOSIS — E03.9 HYPOTHYROIDISM, UNSPECIFIED TYPE: ICD-10-CM

## 2023-07-19 RX ORDER — LEVOTHYROXINE SODIUM 88 UG/1
TABLET ORAL
Qty: 90 TABLET | Refills: 3 | Status: SHIPPED | OUTPATIENT
Start: 2023-07-19

## 2023-07-21 ENCOUNTER — OFFICE VISIT (OUTPATIENT)
Dept: UROLOGY | Facility: CLINIC | Age: 63
End: 2023-07-21
Payer: COMMERCIAL

## 2023-07-21 VITALS — HEIGHT: 70 IN | BODY MASS INDEX: 23.35 KG/M2 | WEIGHT: 163.13 LBS

## 2023-07-21 DIAGNOSIS — R39.15 URINARY URGENCY: ICD-10-CM

## 2023-07-21 DIAGNOSIS — Z85.46 HISTORY OF PROSTATE CANCER: Primary | ICD-10-CM

## 2023-07-21 PROCEDURE — 4010F PR ACE/ARB THEARPY RXD/TAKEN: ICD-10-PCS | Mod: CPTII,S$GLB,, | Performed by: UROLOGY

## 2023-07-21 PROCEDURE — 99999 PR PBB SHADOW E&M-EST. PATIENT-LVL II: ICD-10-PCS | Mod: PBBFAC,,, | Performed by: UROLOGY

## 2023-07-21 PROCEDURE — 99213 OFFICE O/P EST LOW 20 MIN: CPT | Mod: S$GLB,,, | Performed by: UROLOGY

## 2023-07-21 PROCEDURE — 99213 PR OFFICE/OUTPT VISIT, EST, LEVL III, 20-29 MIN: ICD-10-PCS | Mod: S$GLB,,, | Performed by: UROLOGY

## 2023-07-21 PROCEDURE — 99999 PR PBB SHADOW E&M-EST. PATIENT-LVL II: CPT | Mod: PBBFAC,,, | Performed by: UROLOGY

## 2023-07-21 PROCEDURE — 3008F PR BODY MASS INDEX (BMI) DOCUMENTED: ICD-10-PCS | Mod: CPTII,S$GLB,, | Performed by: UROLOGY

## 2023-07-21 PROCEDURE — 3008F BODY MASS INDEX DOCD: CPT | Mod: CPTII,S$GLB,, | Performed by: UROLOGY

## 2023-07-21 PROCEDURE — 4010F ACE/ARB THERAPY RXD/TAKEN: CPT | Mod: CPTII,S$GLB,, | Performed by: UROLOGY

## 2023-07-21 RX ORDER — OXYBUTYNIN CHLORIDE 5 MG/1
5 TABLET, EXTENDED RELEASE ORAL DAILY
Qty: 30 TABLET | Refills: 11 | Status: SHIPPED | OUTPATIENT
Start: 2023-07-21 | End: 2023-11-14

## 2023-07-21 NOTE — PROGRESS NOTES
Subjective:       Patient ID: Nelson Wallace is a 63 y.o. male.    Chief Complaint: Follow-up    HPI    63-year-old with prostate cancer.  He underwent radical prostatectomy in September of 2022.  Final path is pT2N0.  Leisa 3 + 3.  Margins negative, seminal vesicles negative, perineural invasion noted.  His most recent PSA is undetectable.  He is overall doing well.  He has no stress incontinence but does have episodes of urinary urgency.  He has ED which predated his surgery and he is not interested in treatment at this time.  Urine dipstick shows negative for all components.  PVR 1 ml    Component PSA Diagnostic   Latest Ref Rng & Units 0.00 - 4.00 ng/mL   1/27/2023 <0.01   8/3/2022 4.3 (H)   11/17/2021 4.1 (H)   4/2/2021 2.9     Review of Systems   Constitutional:  Negative for fever.   Genitourinary:  Negative for dysuria and hematuria.     Objective:      Physical Exam  Vitals reviewed.   Constitutional:       Appearance: He is well-developed.   Pulmonary:      Effort: Pulmonary effort is normal.   Skin:     Findings: No rash.   Neurological:      Mental Status: He is alert and oriented to person, place, and time.       Assessment:       1. History of prostate cancer    2. Urinary urgency        Plan:       History of prostate cancer  -     Prostate Specific Antigen, Diagnostic; Future; Expected date: 01/21/2024    Urinary urgency    Other orders  -     oxybutynin (DITROPAN-XL) 5 MG TR24; Take 1 tablet (5 mg total) by mouth once daily.  Dispense: 30 tablet; Refill: 11     Trial oxybutynin for urgency.  Update PSA.  Repeat PSA every 6 months

## 2023-08-11 ENCOUNTER — PATIENT MESSAGE (OUTPATIENT)
Dept: CARDIOLOGY | Facility: CLINIC | Age: 63
End: 2023-08-11
Payer: COMMERCIAL

## 2023-08-11 ENCOUNTER — LAB VISIT (OUTPATIENT)
Dept: LAB | Facility: HOSPITAL | Age: 63
End: 2023-08-11
Attending: UROLOGY
Payer: COMMERCIAL

## 2023-08-11 ENCOUNTER — OFFICE VISIT (OUTPATIENT)
Dept: CARDIOLOGY | Facility: CLINIC | Age: 63
End: 2023-08-11
Payer: COMMERCIAL

## 2023-08-11 ENCOUNTER — PATIENT MESSAGE (OUTPATIENT)
Dept: CARDIOLOGY | Facility: CLINIC | Age: 63
End: 2023-08-11

## 2023-08-11 VITALS
RESPIRATION RATE: 16 BRPM | WEIGHT: 163 LBS | OXYGEN SATURATION: 98 % | BODY MASS INDEX: 23.34 KG/M2 | HEIGHT: 70 IN | DIASTOLIC BLOOD PRESSURE: 64 MMHG | HEART RATE: 57 BPM | SYSTOLIC BLOOD PRESSURE: 126 MMHG

## 2023-08-11 DIAGNOSIS — I35.1 AORTIC EJECTION MURMUR: ICD-10-CM

## 2023-08-11 DIAGNOSIS — I10 PRIMARY HYPERTENSION: ICD-10-CM

## 2023-08-11 DIAGNOSIS — I25.10 ATHEROSCLEROSIS OF NATIVE CORONARY ARTERY OF NATIVE HEART WITHOUT ANGINA PECTORIS: Primary | ICD-10-CM

## 2023-08-11 DIAGNOSIS — Z85.46 HISTORY OF PROSTATE CANCER: ICD-10-CM

## 2023-08-11 DIAGNOSIS — I25.10 CORONARY ARTERY DISEASE INVOLVING NATIVE CORONARY ARTERY OF NATIVE HEART WITHOUT ANGINA PECTORIS: ICD-10-CM

## 2023-08-11 DIAGNOSIS — I25.10 ATHEROSCLEROSIS OF NATIVE CORONARY ARTERY OF NATIVE HEART WITHOUT ANGINA PECTORIS: ICD-10-CM

## 2023-08-11 DIAGNOSIS — K21.00 GASTROESOPHAGEAL REFLUX DISEASE WITH ESOPHAGITIS WITHOUT HEMORRHAGE: ICD-10-CM

## 2023-08-11 LAB — COMPLEXED PSA SERPL-MCNC: <0.01 NG/ML (ref 0–4)

## 2023-08-11 PROCEDURE — 99999 PR PBB SHADOW E&M-EST. PATIENT-LVL IV: CPT | Mod: PBBFAC,,, | Performed by: INTERNAL MEDICINE

## 2023-08-11 PROCEDURE — 4010F PR ACE/ARB THEARPY RXD/TAKEN: ICD-10-PCS | Mod: CPTII,S$GLB,, | Performed by: INTERNAL MEDICINE

## 2023-08-11 PROCEDURE — 99214 PR OFFICE/OUTPT VISIT, EST, LEVL IV, 30-39 MIN: ICD-10-PCS | Mod: S$GLB,,, | Performed by: INTERNAL MEDICINE

## 2023-08-11 PROCEDURE — 36415 COLL VENOUS BLD VENIPUNCTURE: CPT | Performed by: UROLOGY

## 2023-08-11 PROCEDURE — 3078F DIAST BP <80 MM HG: CPT | Mod: CPTII,S$GLB,, | Performed by: INTERNAL MEDICINE

## 2023-08-11 PROCEDURE — 3074F PR MOST RECENT SYSTOLIC BLOOD PRESSURE < 130 MM HG: ICD-10-PCS | Mod: CPTII,S$GLB,, | Performed by: INTERNAL MEDICINE

## 2023-08-11 PROCEDURE — 99999 PR PBB SHADOW E&M-EST. PATIENT-LVL IV: ICD-10-PCS | Mod: PBBFAC,,, | Performed by: INTERNAL MEDICINE

## 2023-08-11 PROCEDURE — 3008F PR BODY MASS INDEX (BMI) DOCUMENTED: ICD-10-PCS | Mod: CPTII,S$GLB,, | Performed by: INTERNAL MEDICINE

## 2023-08-11 PROCEDURE — 3008F BODY MASS INDEX DOCD: CPT | Mod: CPTII,S$GLB,, | Performed by: INTERNAL MEDICINE

## 2023-08-11 PROCEDURE — 3078F PR MOST RECENT DIASTOLIC BLOOD PRESSURE < 80 MM HG: ICD-10-PCS | Mod: CPTII,S$GLB,, | Performed by: INTERNAL MEDICINE

## 2023-08-11 PROCEDURE — 99214 OFFICE O/P EST MOD 30 MIN: CPT | Mod: S$GLB,,, | Performed by: INTERNAL MEDICINE

## 2023-08-11 PROCEDURE — 84153 ASSAY OF PSA TOTAL: CPT | Performed by: UROLOGY

## 2023-08-11 PROCEDURE — 4010F ACE/ARB THERAPY RXD/TAKEN: CPT | Mod: CPTII,S$GLB,, | Performed by: INTERNAL MEDICINE

## 2023-08-11 PROCEDURE — 1160F RVW MEDS BY RX/DR IN RCRD: CPT | Mod: CPTII,S$GLB,, | Performed by: INTERNAL MEDICINE

## 2023-08-11 PROCEDURE — 1159F PR MEDICATION LIST DOCUMENTED IN MEDICAL RECORD: ICD-10-PCS | Mod: CPTII,S$GLB,, | Performed by: INTERNAL MEDICINE

## 2023-08-11 PROCEDURE — 1160F PR REVIEW ALL MEDS BY PRESCRIBER/CLIN PHARMACIST DOCUMENTED: ICD-10-PCS | Mod: CPTII,S$GLB,, | Performed by: INTERNAL MEDICINE

## 2023-08-11 PROCEDURE — 1159F MED LIST DOCD IN RCRD: CPT | Mod: CPTII,S$GLB,, | Performed by: INTERNAL MEDICINE

## 2023-08-11 PROCEDURE — 3074F SYST BP LT 130 MM HG: CPT | Mod: CPTII,S$GLB,, | Performed by: INTERNAL MEDICINE

## 2023-08-11 RX ORDER — PANTOPRAZOLE SODIUM 40 MG/1
40 TABLET, DELAYED RELEASE ORAL DAILY
Qty: 90 TABLET | Refills: 3 | Status: SHIPPED | OUTPATIENT
Start: 2023-08-11 | End: 2024-08-10

## 2023-08-11 RX ORDER — RANOLAZINE 500 MG/1
500 TABLET, EXTENDED RELEASE ORAL 2 TIMES DAILY
Qty: 60 TABLET | Refills: 11 | Status: SHIPPED | OUTPATIENT
Start: 2023-08-11 | End: 2024-08-10

## 2023-08-11 RX ORDER — LOSARTAN POTASSIUM 50 MG/1
50 TABLET ORAL DAILY
Qty: 90 TABLET | Refills: 3 | Status: SHIPPED | OUTPATIENT
Start: 2023-08-11

## 2023-08-11 RX ORDER — ROSUVASTATIN CALCIUM 20 MG/1
20 TABLET, COATED ORAL NIGHTLY
Qty: 90 TABLET | Refills: 3 | Status: SHIPPED | OUTPATIENT
Start: 2023-08-11 | End: 2024-08-10

## 2023-08-11 NOTE — ASSESSMENT & PLAN NOTE
His last stress test did not demonstrate any evidence of ischemia done February of 2022.  Some noticeable change in his fatigue and tiredness is present, he does have some progressive symptoms of shortness of breath can not rule out ischemic etiology.  Recommend to obtain a symptom limited exercise stress test with an imaging study in view of prior coronary revascularization  As nitrates may give him for potential severe headache and regular down may consider starting him on Ranexa to optimize medical therapy.  His heart rate is already low he is not a candidate for beta-blocker or non diet dihydropyride calcium blockers.

## 2023-08-11 NOTE — PROGRESS NOTES
Subjective:    Patient ID:  Nelson Wallace is a 63 y.o. male patient here for evaluation Follow-up      History of Present Illness:     63-year-old with history of known coronary artery disease reactive hypertension dyslipidemia and diagnosed with prostate cancer status post resection has been experience some shortness of breath and some fatigue with moderate exertion.  He has been feeling a little bit more fatigue than usual and without any occurrence of any chest discomfort arm neck or jaw pain.  Some noticeable changes in his effort capacity noted.  There is no swelling in the lower extremities no significant cough or congestion and no sputum production noted.  He does suffer from nocturia about once or twice at nighttime.  With some urgency related to it    Occasional dyspeptic symptoms are present but does not wake him up from sleep and does have some symptoms of acid reflux even an upright position.  But there is no blood in the stools no black stools      Review of patient's allergies indicates:  No Known Allergies    Past Medical History:   Diagnosis Date    Allergy     Atherosclerosis of native coronary artery of native heart without angina pectoris     Elevated blood pressure     Elevated PSA     GERD (gastroesophageal reflux disease)     Hyperlipemia     Hypertension     Hypothyroidism     Prostate cancer      Past Surgical History:   Procedure Laterality Date    CORONARY ANGIOPLASTY WITH STENT PLACEMENT      CYSTOSCOPY N/A 11/24/2020    Procedure: CYSTOSCOPY;  Surgeon: Gilmer Daly MD;  Location: [facility] OR;  Service: Urology;  Laterality: N/A;    TRANSRECTAL BIOPSY OF PROSTATE WITH ULTRASOUND GUIDANCE N/A 11/24/2020    Procedure: BIOPSY, PROSTATE, RECTAL APPROACH, WITH US GUIDANCE;  Surgeon: Gilmer Daly MD;  Location: [facility] OR;  Service: Urology;  Laterality: N/A;     Social History     Tobacco Use    Smoking status: Never    Smokeless tobacco: Never   Substance Use Topics    Alcohol use: No     Drug use: No        Review of Systems:    As noted in HPI in addition      REVIEW OF SYSTEMS  More noticeable fatigue and tiredness is present.  CARDIOVASCULAR: No recent chest pain, palpitations, arm, neck, or jaw pain  RESPIRATORY: No recent fever, cough chills, shortness of breath with moderate effort has been more pronounced lately   : No blood in the urine occasional stress incontinence noted and nocturia still persisting but twice a day night.  GI: No Nausea, vomiting, constipation, diarrhea, blood, or reflux.  MUSCULOSKELETAL: No myalgias  NEURO: No lightheadedness or dizziness  EYES: No Double vision, blurry, vision or headache   He is noted to have a small thyroid nodule on the right gland but did not have any follow-up in last few years.           Objective        Vitals:    08/11/23 1409   BP: 126/64   Pulse: (!) 57   Resp: 16       LIPIDS - LAST 2   Lab Results   Component Value Date    CHOL 145 04/02/2021    CHOL 140 02/01/2020    HDL 47 04/02/2021    HDL 45 02/01/2020    LDLCALC 80.8 04/02/2021    LDLCALC 78.6 02/01/2020    TRIG 86 04/02/2021    TRIG 82 02/01/2020    CHOLHDL 32.4 04/02/2021    CHOLHDL 32.1 02/01/2020       CBC - LAST 2  Lab Results   Component Value Date    WBC 8.05 08/26/2022    WBC 8.53 06/01/2021    RBC 4.79 08/26/2022    RBC 4.87 06/01/2021    HGB 14.3 08/26/2022    HGB 14.4 06/01/2021    HCT 41.5 08/26/2022    HCT 42.5 06/01/2021    MCV 87 08/26/2022    MCV 87 06/01/2021    MCH 29.9 08/26/2022    MCH 29.6 06/01/2021    MCHC 34.5 08/26/2022    MCHC 33.9 06/01/2021    RDW 12.1 08/26/2022    RDW 12.0 06/01/2021     08/26/2022     06/01/2021    MPV 11.1 08/26/2022    MPV 11.2 06/01/2021    GRAN 4.7 06/01/2021    GRAN 55.4 06/01/2021    LYMPH 2.9 06/01/2021    LYMPH 33.5 06/01/2021    MONO 0.5 06/01/2021    MONO 5.5 06/01/2021    BASO 0.06 06/01/2021    BASO 0.03 02/01/2020    NRBC 0 06/01/2021    NRBC 0 02/01/2020       CHEMISTRY & LIVER FUNCTION - LAST 2  Lab Results  "  Component Value Date     08/26/2022     06/01/2021    K 4.1 08/26/2022    K 4.7 06/01/2021     08/26/2022     06/01/2021    CO2 27 08/26/2022    CO2 27 06/01/2021    ANIONGAP 6 (L) 08/26/2022    ANIONGAP 9 06/01/2021    BUN 12 08/26/2022    BUN 15 06/01/2021    CREATININE 0.8 08/26/2022    CREATININE 0.9 06/01/2021     08/26/2022     (H) 06/01/2021    CALCIUM 9.1 08/26/2022    CALCIUM 9.4 06/01/2021    MG 2.1 08/26/2022    MG 2.1 07/26/2019    ALBUMIN 4.7 08/26/2022    ALBUMIN 4.5 04/02/2021    PROT 7.6 08/26/2022    PROT 7.3 04/02/2021    ALKPHOS 54 (L) 08/26/2022    ALKPHOS 51 (L) 04/02/2021    ALT 26 08/26/2022    ALT 29 04/02/2021    AST 22 08/26/2022    AST 22 04/02/2021    BILITOT 1.6 (H) 08/26/2022    BILITOT 1.6 (H) 04/02/2021        CARDIAC PROFILE - LAST 2  No results found for: "BNP", "CPK", "CPKMB", "LDH", "TROPONINI", "TROPONINIHS"     COAGULATION - LAST 2  No results found for: "LABPT", "INR", "APTT"    ENDOCRINE & PSA - LAST 2  Lab Results   Component Value Date    HGBA1C 5.8 02/01/2020    TSH 2.370 08/26/2022    TSH 2.450 04/02/2021    PSA 3.3 (H) 04/06/2017        ECHOCARDIOGRAM RESULTS  Results for orders placed during the hospital encounter of 02/11/22    Echo    Interpretation Summary  · The left ventricle is normal in size with mild concentric hypertrophy and normal systolic function.  · The estimated ejection fraction is 65%.  · Normal left ventricular diastolic function.  · Normal right ventricular size with normal right ventricular systolic function.  · Mild mitral regurgitation.  · Normal central venous pressure (3 mmHg).  · The estimated PA systolic pressure is 23 mmHg.      CURRENT/PREVIOUS VISIT EKG  Results for orders placed or performed in visit on 08/26/22   IN OFFICE EKG 12-LEAD (to [place])    Collection Time: 08/26/22 11:52 AM    Narrative    Test Reason : I25.10,Z01.818,    Vent. Rate : 050 BPM     Atrial Rate : 050 BPM     P-R Int : 132 ms      "     QRS Dur : 090 ms      QT Int : 430 ms       P-R-T Axes : -05 048 065 degrees     QTc Int : 392 ms    Sinus bradycardia  Otherwise normal ECG  When compared with ECG of 16-DEC-2021 16:28,  No significant change was found  Confirmed by Moshe Castillo MD ([id number]) on 10/21/2022 4:52:13 PM    Referred By:  ALEJANDRA           Confirmed By:Moshe Castillo MD     No valid procedures specified.   Results for orders placed during the hospital encounter of 02/11/22    Nuclear Stress - Cardiology Interpreted    Interpretation Summary    Normal myocardial perfusion scan. There is no evidence of myocardial ischemia or infarction.    The gated perfusion images showed an ejection fraction of 70% post stress. Normal ejection fraction is greater than 53%.    There is normal wall motion post stress.    LV cavity size is  and normal at stress.    The EKG portion of this study is positive for ischemia.    The patient reported chest pain during the stress test.    No valid procedures specified.    PHYSICAL EXAM  CONSTITUTIONAL: Well built, well nourished in no apparent distress  NECK: no carotid bruit, no JVD  A nodule more firm and palpable in the right thyroid gland in the upper aspect  LUNGS: CTA  CHEST WALL: no tenderness  HEART: regular rate and rhythm, S1, S2 normal, no murmur, click, rub or gallop   ABDOMEN: soft, non-tender; bowel sounds normal; no masses,  no organomegaly  Aortic pulsations palpable but does not appear to be expanded  EXTREMITIES: Extremities normal, no edema, no calf tenderness noted  NEURO: AAO X 3    I HAVE REVIEWED :    The vital signs, nurses notes, and all the pertinent radiology and labs.        Current Outpatient Medications   Medication Instructions    aspirin (ECOTRIN) 81 mg, Oral, Daily    coenzyme Q10 200 mg, Oral, Daily    levothyroxine (SYNTHROID) 88 MCG tablet TAKE 1 TABLET BY MOUTH ONCE A DAY BEFORE BREAKFAST    losartan (COZAAR) 50 MG tablet Take 1 tablet by mouth once daily.    oxybutynin  (DITROPAN-XL) 5 mg, Oral, Daily    rosuvastatin (CRESTOR) 20 MG tablet TAKE 1 TABLET BY MOUTH ONCE DAILY          Assessment & Plan     Atherosclerosis of native coronary artery of native heart without angina pectoris  Clinically appears to be relatively stable.  I have encouraged him to continue on present therapy to include losartan 50 mg once daily risk factor modification with Crestor 20 mg a day and aspirin 81 mg daily.  Encouraged him to repeat his blood work including lipid profile the goal is to keep his LDL cholesterol well below 60 in addition to maintaining aggressive diet control may consider additional therapy if necessary    Coronary artery disease without angina pectoris  His last stress test did not demonstrate any evidence of ischemia done February of 2022.  Some noticeable change in his fatigue and tiredness is present, he does have some progressive symptoms of shortness of breath can not rule out ischemic etiology.  Recommend to obtain a symptom limited exercise stress test with an imaging study in view of prior coronary revascularization  As nitrates may give him for potential severe headache and regular down may consider starting him on Ranexa to optimize medical therapy.  His heart rate is already low he is not a candidate for beta-blocker or non diet dihydropyride calcium blockers.    Hypertension  Blood pressure has been stable office hours his blood pressure runs between 140 and 150 systolic.    Aortic ejection murmur  Soft aortic systolic murmur is not changed.    GERD (gastroesophageal reflux disease)  He does have some dyspeptic symptoms.  I will patient to use pulse doses of PPI agents try Protonix for 2-3 weeks at a time to contain the symptoms use it as needed.          Follow up in about 6 months (around 2/11/2024).

## 2023-08-11 NOTE — ASSESSMENT & PLAN NOTE
He does have some dyspeptic symptoms.  I will patient to use pulse doses of PPI agents try Protonix for 2-3 weeks at a time to contain the symptoms use it as needed.

## 2023-08-11 NOTE — ASSESSMENT & PLAN NOTE
Clinically appears to be relatively stable.  I have encouraged him to continue on present therapy to include losartan 50 mg once daily risk factor modification with Crestor 20 mg a day and aspirin 81 mg daily.  Encouraged him to repeat his blood work including lipid profile the goal is to keep his LDL cholesterol well below 60 in addition to maintaining aggressive diet control may consider additional therapy if necessary

## 2023-08-12 ENCOUNTER — HOSPITAL ENCOUNTER (OUTPATIENT)
Dept: RADIOLOGY | Facility: HOSPITAL | Age: 63
Discharge: HOME | End: 2023-08-12
Attending: INTERNAL MEDICINE
Payer: COMMERCIAL

## 2023-08-12 DIAGNOSIS — I25.10 CORONARY ARTERY DISEASE INVOLVING NATIVE CORONARY ARTERY OF NATIVE HEART WITHOUT ANGINA PECTORIS: ICD-10-CM

## 2023-08-12 DIAGNOSIS — I25.10 ATHEROSCLEROSIS OF NATIVE CORONARY ARTERY OF NATIVE HEART WITHOUT ANGINA PECTORIS: ICD-10-CM

## 2023-08-12 DIAGNOSIS — K21.00 GASTROESOPHAGEAL REFLUX DISEASE WITH ESOPHAGITIS WITHOUT HEMORRHAGE: ICD-10-CM

## 2023-08-12 PROCEDURE — 76536 US SOFT TISSUE HEAD NECK THYROID: ICD-10-PCS | Mod: 26,,, | Performed by: RADIOLOGY

## 2023-08-12 PROCEDURE — 76775 US ABDOMINAL AORTA: ICD-10-PCS | Mod: 26,,, | Performed by: RADIOLOGY

## 2023-08-12 PROCEDURE — 76775 US EXAM ABDO BACK WALL LIM: CPT | Mod: TC

## 2023-08-12 PROCEDURE — 76536 US EXAM OF HEAD AND NECK: CPT | Mod: TC

## 2023-08-12 PROCEDURE — 76775 US EXAM ABDO BACK WALL LIM: CPT | Mod: 26,,, | Performed by: RADIOLOGY

## 2023-08-12 PROCEDURE — 76536 US EXAM OF HEAD AND NECK: CPT | Mod: 26,,, | Performed by: RADIOLOGY

## 2023-08-26 ENCOUNTER — CLINICAL SUPPORT (OUTPATIENT)
Dept: OTHER | Facility: CLINIC | Age: 63
End: 2023-08-26
Payer: COMMERCIAL

## 2023-08-26 DIAGNOSIS — Z00.8 ENCOUNTER FOR OTHER GENERAL EXAMINATION: ICD-10-CM

## 2023-08-26 PROCEDURE — 99401 PR PREVENT COUNSEL,INDIV,15 MIN: ICD-10-PCS | Mod: S$GLB,,, | Performed by: INTERNAL MEDICINE

## 2023-08-26 PROCEDURE — 80061 LIPID PANEL: CPT | Mod: QW,S$GLB,, | Performed by: INTERNAL MEDICINE

## 2023-08-26 PROCEDURE — 82947 PR  ASSAY QUANTITATIVE,BLOOD GLUCOSE: ICD-10-PCS | Mod: QW,S$GLB,, | Performed by: INTERNAL MEDICINE

## 2023-08-26 PROCEDURE — 82947 ASSAY GLUCOSE BLOOD QUANT: CPT | Mod: QW,S$GLB,, | Performed by: INTERNAL MEDICINE

## 2023-08-26 PROCEDURE — 80061 PR  LIPID PANEL: ICD-10-PCS | Mod: QW,S$GLB,, | Performed by: INTERNAL MEDICINE

## 2023-08-26 PROCEDURE — 99401 PREV MED CNSL INDIV APPRX 15: CPT | Mod: S$GLB,,, | Performed by: INTERNAL MEDICINE

## 2023-09-13 VITALS
WEIGHT: 158 LBS | DIASTOLIC BLOOD PRESSURE: 76 MMHG | SYSTOLIC BLOOD PRESSURE: 145 MMHG | BODY MASS INDEX: 22.62 KG/M2 | HEIGHT: 70 IN

## 2023-09-13 LAB
HDLC SERPL-MCNC: 39 MG/DL
POC CHOLESTEROL, LDL (DOCK): 86 MG/DL
POC CHOLESTEROL, TOTAL: 148 MG/DL
POC GLUCOSE, FASTING: 104 MG/DL (ref 60–110)
TRIGL SERPL-MCNC: 129 MG/DL

## 2023-09-19 ENCOUNTER — PATIENT MESSAGE (OUTPATIENT)
Dept: ADMINISTRATIVE | Facility: HOSPITAL | Age: 63
End: 2023-09-19
Payer: COMMERCIAL

## 2023-09-28 ENCOUNTER — TELEPHONE (OUTPATIENT)
Dept: CARDIOLOGY | Facility: HOSPITAL | Age: 63
End: 2023-09-28

## 2023-09-29 ENCOUNTER — CLINICAL SUPPORT (OUTPATIENT)
Dept: CARDIOLOGY | Facility: HOSPITAL | Age: 63
End: 2023-09-29
Attending: INTERNAL MEDICINE
Payer: COMMERCIAL

## 2023-09-29 DIAGNOSIS — I25.10 ATHEROSCLEROSIS OF NATIVE CORONARY ARTERY OF NATIVE HEART WITHOUT ANGINA PECTORIS: ICD-10-CM

## 2023-09-29 DIAGNOSIS — K21.00 GASTROESOPHAGEAL REFLUX DISEASE WITH ESOPHAGITIS WITHOUT HEMORRHAGE: ICD-10-CM

## 2023-09-29 DIAGNOSIS — I25.10 CORONARY ARTERY DISEASE INVOLVING NATIVE CORONARY ARTERY OF NATIVE HEART WITHOUT ANGINA PECTORIS: ICD-10-CM

## 2023-09-29 LAB
CV STRESS BASE HR: 51 BPM
DIASTOLIC BLOOD PRESSURE: 79 MMHG
OHS CV CPX 1 MINUTE RECOVERY HEART RATE: 72 BPM
OHS CV CPX 85 PERCENT MAX PREDICTED HEART RATE MALE: 134
OHS CV CPX ESTIMATED METS: 10.3
OHS CV CPX MAX PREDICTED HEART RATE: 158
OHS CV CPX PATIENT IS FEMALE: 0
OHS CV CPX PATIENT IS MALE: 1
OHS CV CPX PEAK DIASTOLIC BLOOD PRESSURE: 82 MMHG
OHS CV CPX PEAK HEAR RATE: 144 BPM
OHS CV CPX PEAK RATE PRESSURE PRODUCT: NORMAL
OHS CV CPX PEAK SYSTOLIC BLOOD PRESSURE: 206 MMHG
OHS CV CPX PERCENT MAX PREDICTED HEART RATE ACHIEVED: 91
OHS CV CPX RATE PRESSURE PRODUCT PRESENTING: 7650
POST STRESS EJECTION FRACTION: 68 %
STRESS ECHO POST EXERCISE DUR MIN: 9 MINUTES
STRESS ECHO POST EXERCISE DUR SEC: 0 SECONDS
STRESS ST DEPRESSION: 1 MM
SYSTOLIC BLOOD PRESSURE: 150 MMHG

## 2023-09-29 PROCEDURE — 93351 STRESS TTE COMPLETE: CPT | Mod: 26,,, | Performed by: INTERNAL MEDICINE

## 2023-09-29 PROCEDURE — 93351 STRESS ECHO (CUPID ONLY): ICD-10-PCS | Mod: 26,,, | Performed by: INTERNAL MEDICINE

## 2023-09-29 PROCEDURE — 93351 STRESS TTE COMPLETE: CPT

## 2023-11-14 ENCOUNTER — HOSPITAL ENCOUNTER (OUTPATIENT)
Dept: PREADMISSION TESTING | Facility: HOSPITAL | Age: 63
Discharge: HOME | End: 2023-11-14
Attending: INTERNAL MEDICINE
Payer: COMMERCIAL

## 2023-11-14 VITALS
TEMPERATURE: 98 F | SYSTOLIC BLOOD PRESSURE: 144 MMHG | WEIGHT: 164 LBS | DIASTOLIC BLOOD PRESSURE: 79 MMHG | OXYGEN SATURATION: 97 % | HEART RATE: 60 BPM | HEIGHT: 70 IN | RESPIRATION RATE: 16 BRPM | BODY MASS INDEX: 23.48 KG/M2

## 2023-11-14 DIAGNOSIS — Z01.818 PREOP TESTING: Primary | ICD-10-CM

## 2023-11-14 LAB
ANION GAP SERPL CALC-SCNC: 5 MMOL/L (ref 8–16)
BASOPHILS # BLD AUTO: 0.04 K/UL (ref 0–0.2)
BASOPHILS NFR BLD: 0.5 % (ref 0–1.9)
BUN SERPL-MCNC: 16 MG/DL (ref 8–23)
CALCIUM SERPL-MCNC: 9.6 MG/DL (ref 8.7–10.5)
CHLORIDE SERPL-SCNC: 105 MMOL/L (ref 95–110)
CO2 SERPL-SCNC: 28 MMOL/L (ref 23–29)
CREAT SERPL-MCNC: 0.9 MG/DL (ref 0.5–1.4)
DIFFERENTIAL METHOD: ABNORMAL
EOSINOPHIL # BLD AUTO: 0.3 K/UL (ref 0–0.5)
EOSINOPHIL NFR BLD: 3.8 % (ref 0–8)
ERYTHROCYTE [DISTWIDTH] IN BLOOD BY AUTOMATED COUNT: 12.1 % (ref 11.5–14.5)
EST. GFR  (NO RACE VARIABLE): >60 ML/MIN/1.73 M^2
GLUCOSE SERPL-MCNC: 115 MG/DL (ref 70–110)
HCT VFR BLD AUTO: 42.4 % (ref 40–54)
HGB BLD-MCNC: 14.4 G/DL (ref 14–18)
IMM GRANULOCYTES # BLD AUTO: 0.02 K/UL (ref 0–0.04)
IMM GRANULOCYTES NFR BLD AUTO: 0.3 % (ref 0–0.5)
LYMPHOCYTES # BLD AUTO: 3 K/UL (ref 1–4.8)
LYMPHOCYTES NFR BLD: 40.3 % (ref 18–48)
MCH RBC QN AUTO: 29.8 PG (ref 27–31)
MCHC RBC AUTO-ENTMCNC: 34 G/DL (ref 32–36)
MCV RBC AUTO: 88 FL (ref 82–98)
MONOCYTES # BLD AUTO: 0.3 K/UL (ref 0.3–1)
MONOCYTES NFR BLD: 3.5 % (ref 4–15)
NEUTROPHILS # BLD AUTO: 3.9 K/UL (ref 1.8–7.7)
NEUTROPHILS NFR BLD: 51.6 % (ref 38–73)
NRBC BLD-RTO: 0 /100 WBC
PLATELET # BLD AUTO: 174 K/UL (ref 150–450)
PMV BLD AUTO: 11 FL (ref 9.2–12.9)
POTASSIUM SERPL-SCNC: 4.2 MMOL/L (ref 3.5–5.1)
RBC # BLD AUTO: 4.83 M/UL (ref 4.6–6.2)
SODIUM SERPL-SCNC: 138 MMOL/L (ref 136–145)
WBC # BLD AUTO: 7.45 K/UL (ref 3.9–12.7)

## 2023-11-14 PROCEDURE — 85025 COMPLETE CBC W/AUTO DIFF WBC: CPT | Performed by: ANESTHESIOLOGY

## 2023-11-14 PROCEDURE — 93010 ELECTROCARDIOGRAM REPORT: CPT | Mod: ,,, | Performed by: INTERNAL MEDICINE

## 2023-11-14 PROCEDURE — 36415 COLL VENOUS BLD VENIPUNCTURE: CPT | Performed by: ANESTHESIOLOGY

## 2023-11-14 PROCEDURE — 80048 BASIC METABOLIC PNL TOTAL CA: CPT | Performed by: ANESTHESIOLOGY

## 2023-11-14 PROCEDURE — 93010 EKG 12-LEAD: ICD-10-PCS | Mod: ,,, | Performed by: INTERNAL MEDICINE

## 2023-11-14 PROCEDURE — 93005 ELECTROCARDIOGRAM TRACING: CPT | Performed by: INTERNAL MEDICINE

## 2023-11-14 NOTE — DISCHARGE INSTRUCTIONS
INSTRUCTIONS  To confirm your doctor has scheduled your surgery for:11/17/23    Morning of surgery please check in with registration near [address] Entrance then proceed to Registration then to endoscopy department    ENDOSCOPY NURSES will call the afternoon prior to procedure with your final arrival time.    TAKE ONLY THESE MEDICATIONS WITH A SMALL SIP OF WATER THE MORNING OF SURGERY:    DO NOT TAKE ANY INSULIN OR ORAL DIABETIC MEDICATIONS THE MORNING OF SURGERY UNLESS DIRECTED BY YOUR PHYSICIAN OR PRE ADMIT NURSE.    DO NOT TAKE THESE MEDICATIONS 5-7 DAYS PRIOR to your procedure per your surgeon's request: ASPIRIN, ALEVE, BC powder, ROB SELTZER, IBUPROFEN, FISH OIL, VITAMIN E, OR HERBALS   (May take Tylenol)    If you are prescribed any types of blood thinners (Aspirin, Coumadin, Plavix, Pradaxa, Xarelto, Aggrenox, Effient, Eliquis, Savasya, Brilinta or any other), please ask your surgeon how many days before scheduled procedure should you stop taking them. You may also need to verify with prescribing physician if it is OK to stop your blood thinners.      INSTRUCTIONS IMPORTANT!!  Do not eat or drink anything between midnight and the time of your procedure- this includes gum, mints, and candy. You may brush your teeth but do not swallow.  ONLY if you are diabetic, check your sugar in the morning before your procedure.  Do not smoke, vape or drink alcoholic beverages 24 hours prior to your procedure.  If you wear contact lenses, dentures, hearing aids or glasses, bring a container to put them in during surgery and give to a family member for safe keeping.    Please leave all jewelry, piercing's and valuables at home.   Wear comfortable loose clothing and rubber soled shoes.  If your condition changes such as fever, cough, etc, please notify your surgeon.   ONLY if you have been diagnosed with sleep apnea please bring your C-PAP machine.  ONLY if you wear home oxygen please bring your portable oxygen tank the  day of your procedure.   ONLY for patients requiring bowel prep, written instructions will be given by your doctor's office.  ONLY if you have a neuro stimulator, please bring the controller with you the morning of surgery  Make arrangements in advance for transportation home by a responsible adult.  You must make arrangements for transportation, TAXI'S, UBER'S OR LYFTS ARE NOT ALLOWED.        If you have any questions about these instructions, call Endoscopy Nurse at [phone]

## 2023-11-16 ENCOUNTER — ANESTHESIA EVENT (OUTPATIENT)
Dept: SURGERY | Facility: HOSPITAL | Age: 63
End: 2023-11-16
Payer: COMMERCIAL

## 2023-11-17 ENCOUNTER — ANESTHESIA (OUTPATIENT)
Dept: SURGERY | Facility: HOSPITAL | Age: 63
End: 2023-11-17
Payer: COMMERCIAL

## 2023-11-17 ENCOUNTER — HOSPITAL ENCOUNTER (OUTPATIENT)
Facility: HOSPITAL | Age: 63
Discharge: HOME | End: 2023-11-17
Attending: INTERNAL MEDICINE | Admitting: INTERNAL MEDICINE
Payer: COMMERCIAL

## 2023-11-17 VITALS
HEART RATE: 75 BPM | HEART RATE: 62 BPM | SYSTOLIC BLOOD PRESSURE: 115 MMHG | SYSTOLIC BLOOD PRESSURE: 111 MMHG | OXYGEN SATURATION: 100 % | TEMPERATURE: 97 F | DIASTOLIC BLOOD PRESSURE: 66 MMHG | RESPIRATION RATE: 21 BRPM | RESPIRATION RATE: 18 BRPM | DIASTOLIC BLOOD PRESSURE: 67 MMHG | OXYGEN SATURATION: 98 %

## 2023-11-17 PROCEDURE — 27201114 HC TRAP (ANY): Performed by: INTERNAL MEDICINE

## 2023-11-17 PROCEDURE — D9220A PRA ANESTHESIA: ICD-10-PCS | Mod: 33,ANES,, | Performed by: ANESTHESIOLOGY

## 2023-11-17 PROCEDURE — 63600175 PHARM REV CODE 636 W HCPCS: Performed by: STUDENT IN AN ORGANIZED HEALTH CARE EDUCATION/TRAINING PROGRAM

## 2023-11-17 PROCEDURE — 27201089 HC SNARE, DISP (ANY): Performed by: INTERNAL MEDICINE

## 2023-11-17 PROCEDURE — D9220A PRA ANESTHESIA: Mod: 33,ANES,, | Performed by: ANESTHESIOLOGY

## 2023-11-17 PROCEDURE — 45385 COLONOSCOPY W/LESION REMOVAL: CPT | Performed by: INTERNAL MEDICINE

## 2023-11-17 PROCEDURE — D9220A PRA ANESTHESIA: ICD-10-PCS | Mod: 33,CRNA,, | Performed by: STUDENT IN AN ORGANIZED HEALTH CARE EDUCATION/TRAINING PROGRAM

## 2023-11-17 PROCEDURE — 25000003 PHARM REV CODE 250: Performed by: INTERNAL MEDICINE

## 2023-11-17 PROCEDURE — 43239 EGD BIOPSY SINGLE/MULTIPLE: CPT | Performed by: INTERNAL MEDICINE

## 2023-11-17 PROCEDURE — D9220A PRA ANESTHESIA: Mod: 33,CRNA,, | Performed by: STUDENT IN AN ORGANIZED HEALTH CARE EDUCATION/TRAINING PROGRAM

## 2023-11-17 PROCEDURE — 27200043 HC FORCEPS, BIOPSY: Performed by: INTERNAL MEDICINE

## 2023-11-17 PROCEDURE — 25000003 PHARM REV CODE 250: Performed by: STUDENT IN AN ORGANIZED HEALTH CARE EDUCATION/TRAINING PROGRAM

## 2023-11-17 PROCEDURE — 37000008 HC ANESTHESIA 1ST 15 MINUTES: Performed by: INTERNAL MEDICINE

## 2023-11-17 PROCEDURE — 37000009 HC ANESTHESIA EA ADD 15 MINS: Performed by: INTERNAL MEDICINE

## 2023-11-17 RX ORDER — DEXTROMETHORPHAN/PSEUDOEPHED 2.5-7.5/.8
DROPS ORAL
Status: COMPLETED | OUTPATIENT
Start: 2023-11-17 | End: 2023-11-17

## 2023-11-17 RX ORDER — PROPOFOL 10 MG/ML
VIAL (ML) INTRAVENOUS
Status: DISCONTINUED | OUTPATIENT
Start: 2023-11-17 | End: 2023-11-17

## 2023-11-17 RX ADMIN — PROPOFOL 50 MG: 10 INJECTION, EMULSION INTRAVENOUS at 07:11

## 2023-11-17 RX ADMIN — PROPOFOL 30 MG: 10 INJECTION, EMULSION INTRAVENOUS at 07:11

## 2023-11-17 RX ADMIN — PROPOFOL 100 MG: 10 INJECTION, EMULSION INTRAVENOUS at 07:11

## 2023-11-17 RX ADMIN — SODIUM CHLORIDE: 9 INJECTION, SOLUTION INTRAVENOUS at 06:11

## 2023-11-17 NOTE — ANESTHESIA POSTPROCEDURE EVALUATION
Anesthesia Post Evaluation    Patient: Nelson Wallace    Procedure(s) Performed: Procedure(s) (LRB):  COLONOSCOPY (N/A)  EGD (ESOPHAGOGASTRODUODENOSCOPY) (N/A)    Final Anesthesia Type: general      Patient location during evaluation: GI PACU  Patient participation: Yes- Able to Participate  Level of consciousness: awake and alert  Post-procedure vital signs: reviewed and stable  Pain management: adequate  Airway patency: patent    PONV status at discharge: No PONV  Anesthetic complications: no      Cardiovascular status: stable  Respiratory status: unassisted  Hydration status: euvolemic  Follow-up not needed.          Vitals Value Taken Time   /60 11/17/23 0801   Temp 36 °C (96.8 °F) 11/17/23 0749   Pulse 54 11/17/23 0802   Resp 18 11/17/23 0749   SpO2 99 % 11/17/23 0802   Vitals shown include unvalidated device data.      No case tracking events are documented in the log.      Pain/Fransisco Score: No data recorded

## 2023-11-17 NOTE — ANESTHESIA PREPROCEDURE EVALUATION
11/16/2023  Nelson Wallace is a 63 y.o., male.           Tobacco Use:  The patient  reports that he has never smoked. He has never used smokeless tobacco.     Results for orders placed or performed during the hospital encounter of 11/14/23   EKG 12-lead    Collection Time: 11/14/23  7:22 AM    Narrative    Test Reason : Z01.818,    Vent. Rate : 052 BPM     Atrial Rate : 052 BPM     P-R Int : 158 ms          QRS Dur : 082 ms      QT Int : 414 ms       P-R-T Axes : 060 045 057 degrees     QTc Int : 385 ms    Sinus bradycardia  Otherwise normal ECG  When compared with ECG of 26-AUG-2022 11:52,  No significant change was found    Referred By:             Confirmed By:              Lab Results   Component Value Date    WBC 7.45 11/14/2023    HGB 14.4 11/14/2023    HCT 42.4 11/14/2023    MCV 88 11/14/2023     11/14/2023     BMP  Lab Results   Component Value Date     11/14/2023    K 4.2 11/14/2023     11/14/2023    CO2 28 11/14/2023    BUN 16 11/14/2023    CREATININE 0.9 11/14/2023    CALCIUM 9.6 11/14/2023    ANIONGAP 5 (L) 11/14/2023     (H) 11/14/2023     (H) 09/29/2023     08/26/2022       Results for orders placed during the hospital encounter of 02/11/22    Echo    Interpretation Summary  · The left ventricle is normal in size with mild concentric hypertrophy and normal systolic function.  · The estimated ejection fraction is 65%.  · Normal left ventricular diastolic function.  · Normal right ventricular size with normal right ventricular systolic function.  · Mild mitral regurgitation.  · Normal central venous pressure (3 mmHg).  · The estimated PA systolic pressure is 23 mmHg.         Pre-op Assessment    I have reviewed the Patient Summary Reports.     I have reviewed the Nursing Notes. I have reviewed the NPO Status.   I have reviewed the Medications.     Review of  Systems  Anesthesia Hx:  No problems with previous Anesthesia             Denies Family Hx of Anesthesia complications.    Denies Personal Hx of Anesthesia complications.                    Social:  Alcohol Use, Non-Smoker       Hematology/Oncology:  Hematology Normal                       --  Cancer in past history (prostate):                     EENT/Dental:  EENT/Dental Normal           Cardiovascular:     Hypertension, well controlled   CAD  asymptomatic CABG/stent        hyperlipidemia   ECG has been reviewed.                          Pulmonary:  Pulmonary Normal                       Renal/:  Renal/ Normal       Neoplasm/Tumor, Prostate Cancer, s/p resection.           Hepatic/GI:     GERD, well controlled             Musculoskeletal:  Musculoskeletal Normal                Neurological:  Neurology Normal                                      Endocrine:   Hypothyroidism          Psych:  Psychiatric Normal                    Physical Exam  General: Well nourished, Cooperative, Alert and Oriented    Airway:  Mallampati: II   Mouth Opening: Normal  TM Distance: Normal  Tongue: Normal  Neck ROM: Normal ROM    Dental:  Intact    Chest/Lungs:  Clear to auscultation, Normal Respiratory Rate    Heart:  Rate: Normal  Rhythm: Regular Rhythm        Anesthesia Plan  Type of Anesthesia, risks & benefits discussed:    Anesthesia Type: Gen Natural Airway  Intra-op Monitoring Plan: Standard ASA Monitors  Induction:  IV  Informed Consent: Informed consent signed with the Patient and all parties understand the risks and agree with anesthesia plan.  All questions answered.   ASA Score: 3  Anesthesia Plan Notes:       ++Chart Pre Op Only++    GNA  POM  Propofol     Ready For Surgery From Anesthesia Perspective.     .

## 2023-11-17 NOTE — PROVATION PATIENT INSTRUCTIONS
Discharge Summary/Instructions after an Endoscopic Procedure  Patient Name: Nelson Wallace  Patient MRN: [id number]  Patient YOB: 1960 Friday, November 17, 2023  Chirag Martinez MD  RESTRICTIONS:  During your procedure today, you received medications for sedation.  These   medications may affect your judgment, balance and coordination.  Therefore,   for 24 hours, you have the following restrictions:   - DO NOT drive a car, operate machinery, make legal/financial decisions,   sign important papers or drink alcohol.    ACTIVITY:  Today: no heavy lifting, straining or running due to procedural   sedation/anesthesia.  The following day: return to full activity including work.  DIET:  Eat and drink normally unless instructed otherwise.     TREATMENT FOR COMMON SIDE EFFECTS:  - Mild abdominal pain, nausea, belching, bloating or excessive gas:  rest,   eat lightly and use a heating pad.  - Sore Throat: treat with throat lozenges and/or gargle with warm salt   water.  - Because air was used during the procedure, expelling large amounts of air   from your rectum or belching is normal.  - If a bowel prep was taken, you may not have a bowel movement for 1-3 days.    This is normal.  SYMPTOMS TO WATCH FOR AND REPORT TO YOUR PHYSICIAN:  1. Abdominal pain or bloating, other than gas cramps.  2. Chest pain.  3. Back pain.  4. Signs of infection such as: chills or fever occurring within 24 hours   after the procedure.  5. Rectal bleeding, which would show as bright red, maroon, or black stools.   (A tablespoon of blood from the rectum is not serious, especially if   hemorrhoids are present.)  6. Vomiting.  7. Weakness or dizziness.  GO DIRECTLY TO THE NEAREST EMERGENCY ROOM IF YOU HAVE ANY OF THE FOLLOWING:      Difficulty breathing              Chills and/or fever over 101 F   Persistent vomiting and/or vomiting blood   Severe abdominal pain   Severe chest pain   Black, tarry stools   Bleeding- more than one  tablespoon   Any other symptom or condition that you feel may need urgent attention  Your doctor recommends these additional instructions:  If any biopsies were taken, your doctors clinic will contact you in 1 to 2   weeks with any results.  - Discharge patient to home.   - Resume previous diet.   - Continue present medications.   - Await pathology results.   - Repeat colonoscopy in 5 years for surveillance.  For questions, problems or results please call your physician - Chirag Martinez MD at Work:  [phone].  [facility], [facility] RO[facility] PHONE NUMBER: [phone]  IF A COMPLICATION OR EMERGENCY SITUATION ARISES AND YOU ARE UNABLE TO REACH   YOUR PHYSICIAN - GO DIRECTLY TO THE EMERGENCY ROOM.  Chirag Martinez MD  11/17/2023 7:51:50 AM  This report has been verified and signed electronically.  Dear patient,  As a result of recent federal legislation (The Federal Cures Act), you may   receive lab or pathology results from your procedure in your [facility]   account before your physician is able to contact you. Your physician or   their representative will relay the results to you with their   recommendations at their soonest availability.  Thank you,  PROVATION

## 2023-11-17 NOTE — H&P
GASTROENTEROLOGY PRE-PROCEDURE H&P NOTE  Patient Name: Nelson Wallace  Patient MRN: [id number]  Patient : 1960    Service date: 2023    PCP: Andreas Barker MD    No chief complaint on file.      HPI: Patient is a 63 y.o. male with PMHx as below here for evaluation of Epigastric abdominal pain, heartburn, bloating and belching, 10 year colonoscopy screening    Past Medical History:  Past Medical History:   Diagnosis Date    Allergy     Atherosclerosis of native coronary artery of native heart without angina pectoris     Elevated blood pressure     Elevated PSA     GERD (gastroesophageal reflux disease)     Hyperlipemia     Hypertension     Hypothyroidism     Prostate cancer         Past Surgical History:  Past Surgical History:   Procedure Laterality Date    CORONARY ANGIOPLASTY WITH STENT PLACEMENT      CYSTOSCOPY N/A 2020    Procedure: CYSTOSCOPY;  Surgeon: Gilmer Daly MD;  Location: [facility] OR;  Service: Urology;  Laterality: N/A;    TRANSRECTAL BIOPSY OF PROSTATE WITH ULTRASOUND GUIDANCE N/A 2020    Procedure: BIOPSY, PROSTATE, RECTAL APPROACH, WITH US GUIDANCE;  Surgeon: Gilmer Daly MD;  Location: [facility] OR;  Service: Urology;  Laterality: N/A;        Home Medications:  Medications Prior to Admission   Medication Sig Dispense Refill Last Dose    aspirin (ECOTRIN) 81 MG EC tablet Take 1 tablet (81 mg total) by mouth once daily. 90 tablet 3 2023    coenzyme Q10 100 mg capsule Take 200 mg by mouth once daily.   Past Week    levothyroxine (SYNTHROID) 88 MCG tablet TAKE 1 TABLET BY MOUTH ONCE A DAY BEFORE BREAKFAST 90 tablet 3 2023    losartan (COZAAR) 50 MG tablet Take 1 tablet (50 mg total) by mouth once daily. 90 tablet 3 2023    pantoprazole (PROTONIX) 40 MG tablet Take 1 tablet (40 mg total) by mouth once daily. 90 tablet 3 Past Month    ranolazine (RANEXA) 500 MG Tb12 Take 1 tablet (500 mg total) by mouth 2 (two) times daily. 60 tablet 11 2023     "rosuvastatin (CRESTOR) 20 MG tablet Take 1 tablet (20 mg total) by mouth every evening. 90 tablet 3 11/16/2023               Review of patient's allergies indicates:  No Known Allergies    Social History:   Social History     Occupational History     Employer: [employer]   Tobacco Use    Smoking status: Never    Smokeless tobacco: Never   Substance and Sexual Activity    Alcohol use: Yes     Comment: social    Drug use: No    Sexual activity: Yes       Family History:   Family History   Problem Relation Age of Onset    Osteoporosis Mother     Hypertension Mother     Hypertension Father     Hypertension Brother        Review of Systems:  A 10 point review of systems was performed and was normal, except as mentioned in the HPI, including constitutional, HEENT, heme, lymph, cardiovascular, respiratory, gastrointestinal, genitourinary, neurologic, endocrine, psychiatric and musculoskeletal.      OBJECTIVE:    Physical Exam:  24 Hour Vital Sign Ranges: Temp:  [99.3 °F (37.4 °C)] 99.3 °F (37.4 °C)  Pulse:  [64] 64  Resp:  [16] 16  SpO2:  [99 %] 99 %  BP: (158)/(75) 158/75  Most recent vitals: BP (!) 158/75 (BP Location: Right arm, Patient Position: Sitting)   Pulse 64   Temp 99.3 °F (37.4 °C) (Tympanic)   Resp 16   SpO2 99%    GEN: well-developed, well-nourished, awake and alert, non-toxic appearing adult  HEENT: PERRL, sclera anicteric, oral mucosa pink and moist without lesion  NECK: trachea midline; Good ROM  CV: regular rate and rhythm, no murmurs or gallops  RESP: clear to auscultation bilaterally, no wheezes, rhonci or rales  ABD: soft, non-tender, non-distended, normal bowel sounds  EXT: no swelling or edema, 2+ pulses distally  SKIN: no rashes or jaundice  PSYCH: normal affect    Labs:   Recent Labs     11/14/23  1250   WBC 7.45   MCV 88        Recent Labs     11/14/23  1250      K 4.2      CO2 28   BUN 16   *     No results for input(s): "ALB" in the last 72 " "hours.    Invalid input(s): "ALKP", "SGOT", "SGPT", "TBIL", "DBIL", "TPRO"  No results for input(s): "PT", "INR", "PTT" in the last 72 hours.      IMPRESSION / RECOMMENDATIONS:  EGD/Colonoscopy  with interventions as warranted.   RIsks, benefits, alternatives discussed in detail regarding upcoming procedures and sedation. Some of the more common endoscopic complications include but not limited to immediate or delayed perforation, bleeding, infections, pain, inadvertent injury to surrounding tissue / organs and possible need for surgical evaluation. Patient expressed understanding, all questions answered and will proceed with procedure as planned.     Chirag Cisnerosor  11/17/2023  7:13 AM    "

## 2023-11-17 NOTE — TRANSFER OF CARE
Anesthesia Transfer of Care Note    Patient: Nelson Wallace    Procedure(s) Performed: Procedure(s) (LRB):  COLONOSCOPY (N/A)  EGD (ESOPHAGOGASTRODUODENOSCOPY) (N/A)    Patient location: GI    Anesthesia Type: general    Transport from OR: Transported from OR on room air with adequate spontaneous ventilation    Post pain: adequate analgesia    Post assessment: no apparent anesthetic complications and tolerated procedure well    Post vital signs: stable    Level of consciousness: awake    Nausea/Vomiting: no nausea/vomiting    Complications: none    Transfer of care protocol was followed      Last vitals: Visit Vitals  BP (!) 158/75 (BP Location: Right arm, Patient Position: Sitting)   Pulse 64   Temp 37.4 °C (99.3 °F) (Tympanic)   Resp 16   SpO2 99%

## 2023-11-17 NOTE — PROVATION PATIENT INSTRUCTIONS
Discharge Summary/Instructions after an Endoscopic Procedure  Patient Name: Nelson Wallace  Patient MRN: [id number]  Patient YOB: 1960 Friday, November 17, 2023  Chirag Martinez MD  RESTRICTIONS:  During your procedure today, you received medications for sedation.  These   medications may affect your judgment, balance and coordination.  Therefore,   for 24 hours, you have the following restrictions:   - DO NOT drive a car, operate machinery, make legal/financial decisions,   sign important papers or drink alcohol.    ACTIVITY:  Today: no heavy lifting, straining or running due to procedural   sedation/anesthesia.  The following day: return to full activity including work.  DIET:  Eat and drink normally unless instructed otherwise.     TREATMENT FOR COMMON SIDE EFFECTS:  - Mild abdominal pain, nausea, belching, bloating or excessive gas:  rest,   eat lightly and use a heating pad.  - Sore Throat: treat with throat lozenges and/or gargle with warm salt   water.  - Because air was used during the procedure, expelling large amounts of air   from your rectum or belching is normal.  - If a bowel prep was taken, you may not have a bowel movement for 1-3 days.    This is normal.  SYMPTOMS TO WATCH FOR AND REPORT TO YOUR PHYSICIAN:  1. Abdominal pain or bloating, other than gas cramps.  2. Chest pain.  3. Back pain.  4. Signs of infection such as: chills or fever occurring within 24 hours   after the procedure.  5. Rectal bleeding, which would show as bright red, maroon, or black stools.   (A tablespoon of blood from the rectum is not serious, especially if   hemorrhoids are present.)  6. Vomiting.  7. Weakness or dizziness.  GO DIRECTLY TO THE NEAREST EMERGENCY ROOM IF YOU HAVE ANY OF THE FOLLOWING:      Difficulty breathing              Chills and/or fever over 101 F   Persistent vomiting and/or vomiting blood   Severe abdominal pain   Severe chest pain   Black, tarry stools   Bleeding- more than one  tablespoon   Any other symptom or condition that you feel may need urgent attention  Your doctor recommends these additional instructions:  If any biopsies were taken, your doctors clinic will contact you in 1 to 2   weeks with any results.  - Await pathology results.   - Resume previous diet.   - Continue present medications.   - Discharge patient to home.  For questions, problems or results please call your physician - Chirag Martinez MD at Work:  [phone].  [facility], EMERGENCY ROOM PHONE NUMBER: [phone]  IF A COMPLICATION OR EMERGENCY SITUATION ARISES AND YOU ARE UNABLE TO REACH   YOUR PHYSICIAN - GO DIRECTLY TO THE EMERGENCY ROOM.  Chirag Martinez MD  11/17/2023 7:48:36 AM  This report has been verified and signed electronically.  Dear patient,  As a result of recent federal legislation (The Federal Cures Act), you may   receive lab or pathology results from your procedure in your [facility]   account before your physician is able to contact you. Your physician or   their representative will relay the results to you with their   recommendations at their soonest availability.  Thank you,  PROVATION

## 2024-04-15 ENCOUNTER — OFFICE VISIT (OUTPATIENT)
Dept: FAMILY MEDICINE | Facility: CLINIC | Age: 64
End: 2024-04-15
Payer: COMMERCIAL

## 2024-04-15 VITALS
SYSTOLIC BLOOD PRESSURE: 131 MMHG | DIASTOLIC BLOOD PRESSURE: 69 MMHG | OXYGEN SATURATION: 98 % | BODY MASS INDEX: 23.53 KG/M2 | HEART RATE: 49 BPM | HEIGHT: 70 IN

## 2024-04-15 DIAGNOSIS — I10 PRIMARY HYPERTENSION: ICD-10-CM

## 2024-04-15 DIAGNOSIS — C61 PROSTATE CANCER: ICD-10-CM

## 2024-04-15 DIAGNOSIS — Z00.00 ANNUAL PHYSICAL EXAM: Primary | ICD-10-CM

## 2024-04-15 PROCEDURE — 99999 PR PBB SHADOW E&M-EST. PATIENT-LVL III: CPT | Mod: PBBFAC,,, | Performed by: FAMILY MEDICINE

## 2024-04-15 PROCEDURE — 3078F DIAST BP <80 MM HG: CPT | Mod: CPTII,S$GLB,, | Performed by: FAMILY MEDICINE

## 2024-04-15 PROCEDURE — 3008F BODY MASS INDEX DOCD: CPT | Mod: CPTII,S$GLB,, | Performed by: FAMILY MEDICINE

## 2024-04-15 PROCEDURE — 4010F ACE/ARB THERAPY RXD/TAKEN: CPT | Mod: CPTII,S$GLB,, | Performed by: FAMILY MEDICINE

## 2024-04-15 PROCEDURE — 99396 PREV VISIT EST AGE 40-64: CPT | Mod: S$GLB,,, | Performed by: FAMILY MEDICINE

## 2024-04-15 PROCEDURE — 3075F SYST BP GE 130 - 139MM HG: CPT | Mod: CPTII,S$GLB,, | Performed by: FAMILY MEDICINE

## 2024-04-15 PROCEDURE — 1159F MED LIST DOCD IN RCRD: CPT | Mod: CPTII,S$GLB,, | Performed by: FAMILY MEDICINE

## 2024-04-15 NOTE — PROGRESS NOTES
SUBJECTIVE:    Patient ID: Nelson Wallace is a 63 y.o. male.    Chief Complaint: Annual Exam  63-year-old male returns to clinic for his annual exam, he is doing well he has no new complaints. Hx of prostate cancer S/P prostatectomy, doing well. Recently went up on his Synthroid to 88mcg will need to get follow up TSH.       I reviewed his overdue health maintenance patient is due for pneumococcal vaccine, HIV screening, shingles vaccine, RSV  , COVID-19 vaccine booster.      Past medical history  Hypertension: Losartan 50mg, I  Hyperlipidemia:  Crestor 20 mg  Hypothyroid:  Levothyroxine 88 mcg  Coronary artery disease with stents:  Aspirin 81 mg  Prostate cancer prostate removed  TRANG: Zyrtec occasionally        Specialist  Cardiology Dr. Castillo  Urology: [facility]     Smoke: None  ETOH: 1-2 drinks a year  Exercise: Regularly    HPI      Past Medical History:   Diagnosis Date    Allergy     Atherosclerosis of native coronary artery of native heart without angina pectoris     Elevated blood pressure     Elevated PSA     GERD (gastroesophageal reflux disease)     Hyperlipemia     Hypertension     Hypothyroidism     Prostate cancer      Social History     Socioeconomic History    Marital status:    Occupational History     Employer: [employer]   Tobacco Use    Smoking status: Never    Smokeless tobacco: Never   Substance and Sexual Activity    Alcohol use: Yes     Comment: social    Drug use: No    Sexual activity: Yes     Social Determinants of Health     Stress: No Stress Concern Present (10/9/2020)    [employer] [place] [employer] - Occupational Stress Questionnaire     Feeling of Stress : Not at all     Past Surgical History:   Procedure Laterality Date    COLONOSCOPY N/A 11/17/2023    Procedure: COLONOSCOPY;  Surgeon: Chirag Martinez MD;  Location: [facility];  Service: Endoscopy;  Laterality: N/A;    CORONARY ANGIOPLASTY WITH STENT PLACEMENT      CYSTOSCOPY N/A 11/24/2020     Procedure: CYSTOSCOPY;  Surgeon: Gilmer Daly MD;  Location: [facility] OR;  Service: Urology;  Laterality: N/A;    ESOPHAGOGASTRODUODENOSCOPY N/A 11/17/2023    Procedure: EGD (ESOPHAGOGASTRODUODENOSCOPY);  Surgeon: Chirag Martinez MD;  Location: [facility] ENDO;  Service: Endoscopy;  Laterality: N/A;    TRANSRECTAL BIOPSY OF PROSTATE WITH ULTRASOUND GUIDANCE N/A 11/24/2020    Procedure: BIOPSY, PROSTATE, RECTAL APPROACH, WITH US GUIDANCE;  Surgeon: Gilmer Daly MD;  Location: [facility] OR;  Service: Urology;  Laterality: N/A;     Family History   Problem Relation Name Age of Onset    Osteoporosis Mother      Hypertension Mother      Hypertension Father      Hypertension Brother       Current Outpatient Medications   Medication Sig Dispense Refill    aspirin (ECOTRIN) 81 MG EC tablet Take 1 tablet (81 mg total) by mouth once daily. 90 tablet 3    coenzyme Q10 100 mg capsule Take 200 mg by mouth once daily.      levothyroxine (SYNTHROID) 88 MCG tablet TAKE 1 TABLET BY MOUTH ONCE A DAY BEFORE BREAKFAST 90 tablet 3    losartan (COZAAR) 50 MG tablet Take 1 tablet (50 mg total) by mouth once daily. 90 tablet 3    pantoprazole (PROTONIX) 40 MG tablet Take 1 tablet (40 mg total) by mouth once daily. 90 tablet 3    rosuvastatin (CRESTOR) 20 MG tablet Take 1 tablet (20 mg total) by mouth every evening. 90 tablet 3     No current facility-administered medications for this visit.     Review of patient's allergies indicates:  No Known Allergies    Review of Systems   Constitutional:  Negative for activity change, appetite change, diaphoresis, fatigue, fever and unexpected weight change.   HENT:  Negative for congestion, postnasal drip, rhinorrhea, sinus pressure and sinus pain.    Respiratory:  Negative for cough, chest tightness, shortness of breath and wheezing.    Cardiovascular:  Negative for chest pain and palpitations.   Gastrointestinal:  Negative for abdominal distention, abdominal pain, anal bleeding, blood in stool,  "constipation and diarrhea.   Genitourinary:  Negative for dysuria, flank pain and hematuria.          Blood pressure 131/69, pulse (!) 49, height 5' 10" (1.778 m), SpO2 98%. Body mass index is 23.53 kg/m².   Objective:      Physical Exam  Vitals reviewed.   Constitutional:       General: He is not in acute distress.     Appearance: Normal appearance. He is normal weight. He is not ill-appearing or toxic-appearing.   HENT:      Head: Normocephalic and atraumatic.      Right Ear: Tympanic membrane normal.      Left Ear: Tympanic membrane normal.      Nose: Nose normal. No congestion or rhinorrhea.      Mouth/Throat:      Mouth: Mucous membranes are moist.      Pharynx: No oropharyngeal exudate or posterior oropharyngeal erythema.   Eyes:      General: No scleral icterus.     Conjunctiva/sclera: Conjunctivae normal.      Pupils: Pupils are equal, round, and reactive to light.   Cardiovascular:      Rate and Rhythm: Normal rate and regular rhythm.      Heart sounds: Murmur heard.   Pulmonary:      Effort: Pulmonary effort is normal. No respiratory distress.      Breath sounds: No wheezing or rhonchi.   Skin:     General: Skin is warm and dry.      Capillary Refill: Capillary refill takes less than 2 seconds.   Neurological:      Mental Status: He is alert and oriented to person, place, and time.             Assessment:       1. Annual physical exam    2. Prostate cancer    3. Primary hypertension         Plan:           Annual physical exam  -     TSH; Future; Expected date: 04/15/2024  -     LIPID PANEL; Future; Expected date: 04/15/2024  -     COMPREHENSIVE METABOLIC PANEL; Future; Expected date: 04/15/2024  -     HEMOGLOBIN A1C; Future; Expected date: 04/15/2024  Patient counseled on age appropriate medical preventive services, age appropriate cancer screenings, nutrition, healthy diet, consistent exercise regimen and maintaining an active lifestyle.     Prostate cancer  -     PROSTATE SPECIFIC ANTIGEN, DIAGNOSTIC; " Future; Expected date: 04/15/2024    Primary hypertension  -     Microalbumin/Creatinine Ratio, Urine; Future; Expected date: 04/15/2024

## 2024-04-19 ENCOUNTER — LAB VISIT (OUTPATIENT)
Dept: LAB | Facility: HOSPITAL | Age: 64
End: 2024-04-19
Attending: FAMILY MEDICINE

## 2024-04-19 DIAGNOSIS — Z00.00 ANNUAL PHYSICAL EXAM: ICD-10-CM

## 2024-04-19 DIAGNOSIS — I10 PRIMARY HYPERTENSION: ICD-10-CM

## 2024-04-19 DIAGNOSIS — C61 PROSTATE CANCER: ICD-10-CM

## 2024-04-19 LAB
ALBUMIN SERPL BCP-MCNC: 4.2 G/DL (ref 3.5–5.2)
ALBUMIN/CREAT UR: 9.5 UG/MG (ref 0–30)
ALP SERPL-CCNC: 48 U/L (ref 55–135)
ALT SERPL W/O P-5'-P-CCNC: 21 U/L (ref 10–44)
ANION GAP SERPL CALC-SCNC: 7 MMOL/L (ref 8–16)
AST SERPL-CCNC: 19 U/L (ref 10–40)
BILIRUB SERPL-MCNC: 1.1 MG/DL (ref 0.1–1)
BUN SERPL-MCNC: 13 MG/DL (ref 8–23)
CALCIUM SERPL-MCNC: 8.9 MG/DL (ref 8.7–10.5)
CHLORIDE SERPL-SCNC: 108 MMOL/L (ref 95–110)
CHOLEST SERPL-MCNC: 120 MG/DL (ref 120–199)
CHOLEST/HDLC SERPL: 2.9 {RATIO} (ref 2–5)
CO2 SERPL-SCNC: 26 MMOL/L (ref 23–29)
COMPLEXED PSA SERPL-MCNC: <0.01 NG/ML (ref 0–4)
CREAT SERPL-MCNC: 0.9 MG/DL (ref 0.5–1.4)
CREAT UR-MCNC: 237.2 MG/DL (ref 23–375)
EST. GFR  (NO RACE VARIABLE): >60 ML/MIN/1.73 M^2
ESTIMATED AVG GLUCOSE: 123 MG/DL (ref 68–131)
GLUCOSE SERPL-MCNC: 103 MG/DL (ref 70–110)
HBA1C MFR BLD: 5.9 % (ref 4.5–6.2)
HDLC SERPL-MCNC: 42 MG/DL (ref 40–75)
HDLC SERPL: 35 % (ref 20–50)
LDLC SERPL CALC-MCNC: 55.4 MG/DL (ref 63–159)
MICROALBUMIN UR DL<=1MG/L-MCNC: 22.5 UG/ML
NONHDLC SERPL-MCNC: 78 MG/DL
POTASSIUM SERPL-SCNC: 4.3 MMOL/L (ref 3.5–5.1)
PROT SERPL-MCNC: 6.8 G/DL (ref 6–8.4)
SODIUM SERPL-SCNC: 141 MMOL/L (ref 136–145)
TRIGL SERPL-MCNC: 113 MG/DL (ref 30–150)
TSH SERPL DL<=0.005 MIU/L-ACNC: 1.79 UIU/ML (ref 0.34–5.6)

## 2024-04-19 PROCEDURE — 83036 HEMOGLOBIN GLYCOSYLATED A1C: CPT | Performed by: FAMILY MEDICINE

## 2024-04-19 PROCEDURE — 82043 UR ALBUMIN QUANTITATIVE: CPT | Performed by: FAMILY MEDICINE

## 2024-04-19 PROCEDURE — 84443 ASSAY THYROID STIM HORMONE: CPT | Performed by: FAMILY MEDICINE

## 2024-04-19 PROCEDURE — 80061 LIPID PANEL: CPT | Performed by: FAMILY MEDICINE

## 2024-04-19 PROCEDURE — 84153 ASSAY OF PSA TOTAL: CPT | Performed by: FAMILY MEDICINE

## 2024-04-19 PROCEDURE — 36415 COLL VENOUS BLD VENIPUNCTURE: CPT | Performed by: FAMILY MEDICINE

## 2024-04-19 PROCEDURE — 80053 COMPREHEN METABOLIC PANEL: CPT | Performed by: FAMILY MEDICINE

## 2024-07-16 DIAGNOSIS — E03.9 HYPOTHYROIDISM, UNSPECIFIED TYPE: ICD-10-CM

## 2024-07-16 RX ORDER — LEVOTHYROXINE SODIUM 88 UG/1
TABLET ORAL
Qty: 90 TABLET | Refills: 3 | Status: CANCELLED | OUTPATIENT
Start: 2024-07-16

## 2024-07-17 DIAGNOSIS — E03.9 HYPOTHYROIDISM, UNSPECIFIED TYPE: ICD-10-CM

## 2024-07-17 RX ORDER — LEVOTHYROXINE SODIUM 88 UG/1
TABLET ORAL
Qty: 90 TABLET | Refills: 3 | OUTPATIENT
Start: 2024-07-17

## 2024-07-18 DIAGNOSIS — E03.9 HYPOTHYROIDISM, UNSPECIFIED TYPE: ICD-10-CM

## 2024-07-18 RX ORDER — LEVOTHYROXINE SODIUM 88 UG/1
TABLET ORAL
Qty: 90 TABLET | Refills: 3 | Status: SHIPPED | OUTPATIENT
Start: 2024-07-18

## 2024-08-28 RX ORDER — ROSUVASTATIN CALCIUM 20 MG/1
20 TABLET, COATED ORAL NIGHTLY
Qty: 90 TABLET | Refills: 3 | Status: SHIPPED | OUTPATIENT
Start: 2024-08-28 | End: 2025-08-28

## 2024-09-27 ENCOUNTER — OFFICE VISIT (OUTPATIENT)
Dept: CARDIOLOGY | Facility: CLINIC | Age: 64
End: 2024-09-27
Payer: COMMERCIAL

## 2024-09-27 VITALS
BODY MASS INDEX: 23.19 KG/M2 | WEIGHT: 162 LBS | HEIGHT: 70 IN | SYSTOLIC BLOOD PRESSURE: 126 MMHG | OXYGEN SATURATION: 98 % | HEART RATE: 47 BPM | DIASTOLIC BLOOD PRESSURE: 68 MMHG | RESPIRATION RATE: 16 BRPM

## 2024-09-27 DIAGNOSIS — I15.1 HYPERTENSION SECONDARY TO OTHER RENAL DISORDERS: ICD-10-CM

## 2024-09-27 DIAGNOSIS — E03.2 HYPOTHYROIDISM DUE TO NON-MEDICATION EXOGENOUS SUBSTANCES: ICD-10-CM

## 2024-09-27 DIAGNOSIS — E78.2 MODERATE MIXED HYPERLIPIDEMIA NOT REQUIRING STATIN THERAPY: ICD-10-CM

## 2024-09-27 DIAGNOSIS — I25.118 ATHEROSCLEROTIC HEART DISEASE OF NATIVE CORONARY ARTERY WITH OTHER FORMS OF ANGINA PECTORIS: ICD-10-CM

## 2024-09-27 DIAGNOSIS — I25.10 CORONARY ARTERY DISEASE INVOLVING NATIVE CORONARY ARTERY OF NATIVE HEART WITHOUT ANGINA PECTORIS: Primary | ICD-10-CM

## 2024-09-27 DIAGNOSIS — I49.3 PVC (PREMATURE VENTRICULAR CONTRACTION): ICD-10-CM

## 2024-09-27 LAB
OHS QRS DURATION: 88 MS
OHS QTC CALCULATION: 386 MS

## 2024-09-27 PROCEDURE — 99999 PR PBB SHADOW E&M-EST. PATIENT-LVL III: CPT | Mod: PBBFAC,,, | Performed by: INTERNAL MEDICINE

## 2024-09-27 RX ORDER — LOSARTAN POTASSIUM 50 MG/1
50 TABLET ORAL DAILY
Qty: 90 TABLET | Refills: 3 | Status: SHIPPED | OUTPATIENT
Start: 2024-09-27

## 2024-09-27 RX ORDER — LANOLIN ALCOHOL/MO/W.PET/CERES
400 CREAM (GRAM) TOPICAL DAILY
Qty: 90 TABLET | Refills: 3 | Status: SHIPPED | OUTPATIENT
Start: 2024-09-27 | End: 2025-09-27

## 2024-09-27 RX ORDER — ROSUVASTATIN CALCIUM 20 MG/1
20 TABLET, COATED ORAL NIGHTLY
Qty: 90 TABLET | Refills: 3 | Status: SHIPPED | OUTPATIENT
Start: 2024-09-27 | End: 2025-09-27

## 2024-09-27 NOTE — ASSESSMENT & PLAN NOTE
He is maintaining on levothyroxine at 88 mcg a day in the last TSH was within normal range continue the same

## 2024-09-27 NOTE — PROGRESS NOTES
Subjective:    Patient ID:  Nelson Wallace is a 64 y.o. 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  Some fatigue is noted with moderate effort and some dyspnea on moderate exercise.  Does have some dyspeptic symptoms periodically for which she takes pantoprazole and helps him.    He has no sustained substernal heaviness energy when during his exercise.  No arm neck or jaw pain needed he does feel some palpitations sometimes at nighttime as well but it is not racing but feels heavy heartbeats.  No dizziness lightheadedness fainting sensation or weakness he was.  He has been careful with his dietary intake and also weight loss is noted.  This has no blood in the stools no black stools and no nausea vomiting colonoscopy last year was negative        Review of patient's allergies indicates:  No Known Allergies    Past Medical History:   Diagnosis Date    Allergy     Atherosclerosis of native coronary artery of native heart without angina pectoris     Elevated blood pressure     Elevated PSA     GERD (gastroesophageal reflux disease)     Hyperlipemia     Hypertension     Hypothyroidism     Prostate cancer      Past Surgical History:   Procedure Laterality Date    COLONOSCOPY N/A 11/17/2023    Procedure: COLONOSCOPY;  Surgeon: Chirag Martinez MD;  Location: [facility];  Service: Endoscopy;  Laterality: N/A;    CORONARY ANGIOPLASTY WITH STENT PLACEMENT      CYSTOSCOPY N/A 11/24/2020    Procedure: CYSTOSCOPY;  Surgeon: Gilmer Daly MD;  Location: [facility];  Service: Urology;  Laterality: N/A;    ESOPHAGOGASTRODUODENOSCOPY N/A 11/17/2023    Procedure: EGD (ESOPHAGOGASTRODUODENOSCOPY);  Surgeon: Chirag Martinez MD;  Location: [facility];  Service: Endoscopy;  Laterality: N/A;    TRANSRECTAL BIOPSY OF PROSTATE WITH ULTRASOUND GUIDANCE N/A 11/24/2020    Procedure: BIOPSY, PROSTATE, RECTAL APPROACH, WITH  US GUIDANCE;  Surgeon: Gilmer Daly MD;  Location: [facility];  Service: Urology;  Laterality: N/A;     Social History     Tobacco Use    Smoking status: Never    Smokeless tobacco: Never   Substance Use Topics    Alcohol use: Yes     Comment: social    Drug use: No        Review of Systems:    As noted in HPI in addition      REVIEW OF SYSTEMS  CARDIOVASCULAR: No recent chest pain, palpitations, arm, neck, or jaw pain  RESPIRATORY: No recent fever, cough chills, SOB or congestion  : No blood in the urine  GI: No Nausea, vomiting, constipation, diarrhea, blood, or reflux.  MUSCULOSKELETAL: No myalgias muscular skeletal  symptoms are stable.  He was using can all/Co Q10  NEURO: No lightheadedness or dizziness  EYES: No Double vision, blurry, vision or headache              Objective        Vitals:    09/27/24 0828   BP: 126/68   Pulse: (!) 47   Resp: 16       LIPIDS - LAST 2   Lab Results   Component Value Date    CHOL 120 04/19/2024    CHOL 138 09/29/2023    HDL 42 04/19/2024    HDL 42 09/29/2023    LDLCALC 55.4 (L) 04/19/2024    LDLCALC 71.6 09/29/2023    TRIG 113 04/19/2024    TRIG 122 09/29/2023    CHOLHDL 35.0 04/19/2024    CHOLHDL 30.4 09/29/2023       CBC - LAST 2  Lab Results   Component Value Date    WBC 7.45 11/14/2023    WBC 9.15 09/29/2023    RBC 4.83 11/14/2023    RBC 4.85 09/29/2023    HGB 14.4 11/14/2023    HGB 14.3 09/29/2023    HCT 42.4 11/14/2023    HCT 42.6 09/29/2023    MCV 88 11/14/2023    MCV 88 09/29/2023    MCH 29.8 11/14/2023    MCH 29.5 09/29/2023    MCHC 34.0 11/14/2023    MCHC 33.6 09/29/2023    RDW 12.1 11/14/2023    RDW 12.3 09/29/2023     11/14/2023     09/29/2023    MPV 11.0 11/14/2023    MPV 10.5 09/29/2023    GRAN 3.9 11/14/2023    GRAN 51.6 11/14/2023    LYMPH 3.0 11/14/2023    LYMPH 40.3 11/14/2023    MONO 0.3 11/14/2023    MONO 3.5 (L) 11/14/2023    BASO 0.04 11/14/2023    BASO 0.06 06/01/2021    NRBC 0 11/14/2023    NRBC 0 06/01/2021       CHEMISTRY & LIVER  "FUNCTION - LAST 2  Lab Results   Component Value Date     04/19/2024     11/14/2023    K 4.3 04/19/2024    K 4.2 11/14/2023     04/19/2024     11/14/2023    CO2 26 04/19/2024    CO2 28 11/14/2023    ANIONGAP 7 (L) 04/19/2024    ANIONGAP 5 (L) 11/14/2023    BUN 13 04/19/2024    BUN 16 11/14/2023    CREATININE 0.9 04/19/2024    CREATININE 0.9 11/14/2023     04/19/2024     (H) 11/14/2023    CALCIUM 8.9 04/19/2024    CALCIUM 9.6 11/14/2023    MG 2.2 09/29/2023    MG 2.1 08/26/2022    ALBUMIN 4.2 04/19/2024    ALBUMIN 4.4 09/29/2023    PROT 6.8 04/19/2024    PROT 7.2 09/29/2023    ALKPHOS 48 (L) 04/19/2024    ALKPHOS 45 (L) 09/29/2023    ALT 21 04/19/2024    ALT 26 09/29/2023    AST 19 04/19/2024    AST 18 09/29/2023    BILITOT 1.1 (H) 04/19/2024    BILITOT 1.0 09/29/2023        CARDIAC PROFILE - LAST 2  No results found for: "BNP", "CPK", "CPKMB", "LDH", "TROPONINI", "TROPONINIHS"     COAGULATION - LAST 2  No results found for: "LABPT", "INR", "APTT"    ENDOCRINE & PSA - LAST 2  Lab Results   Component Value Date    HGBA1C 5.9 04/19/2024    HGBA1C 5.8 02/01/2020    TSH 1.791 04/19/2024    TSH 7.251 (H) 09/29/2023    PSA 3.3 (H) 04/06/2017        ECHOCARDIOGRAM RESULTS  Results for orders placed in visit on 09/29/23    Stress Echo Which stress agent will be used? Treadmill Exercise; Color Flow Doppler? No    Interpretation Summary    Left Ventricle: The left ventricle is normal in size. Normal wall thickness. Normal wall motion. There is normal systolic function with a visually estimated ejection fraction of 60 - 65%.    Stress Protocol: The patient exercised for 9 minutes 0 seconds on a Negro protocol, corresponding to a functional capacity of 10.3 METS, achieving a peak heart rate of 144 bpm, which is 91 % of the age predicted maximum heart rate. The patient reported no symptoms during the stress test. The test was stopped because the patient experienced fatigue.    Baseline ECG: " The Baseline ECG reveals sinus bradycardia. The axis is normal. The ST segments are normal.    Stress ECG: There is 1.0 mm of upward-sloping ST segment depression in the inferolateral leads (II, III, aVF, V5 and V6) noted during stress.    ECG Conclusion: The ECG portion of the study is positive for ischemia.    Post-stress Echo: The left ventricle systolic function is normal with an EF of 68 %.    Post-stress      CURRENT/PREVIOUS VISIT EKG  Results for orders placed or performed during the hospital encounter of 11/14/23   EKG 12-lead    Collection Time: 11/14/23  7:22 AM    Narrative    Test Reason : Z01.818,    Vent. Rate : 052 BPM     Atrial Rate : 052 BPM     P-R Int : 158 ms          QRS Dur : 082 ms      QT Int : 414 ms       P-R-T Axes : 060 045 057 degrees     QTc Int : 385 ms    Sinus bradycardia  Otherwise normal ECG  When compared with ECG of 26-AUG-2022 11:52,  No significant change was found  Confirmed by Moshe Castillo MD ([id number]) on 11/19/2023 6:33:58 PM    Referred By:             Confirmed By:Moshe Castillo MD     No valid procedures specified.   Results for orders placed during the hospital encounter of 02/11/22    Nuclear Stress - Cardiology Interpreted    Interpretation Summary    Normal myocardial perfusion scan. There is no evidence of myocardial ischemia or infarction.    The gated perfusion images showed an ejection fraction of 70% post stress. Normal ejection fraction is greater than 53%.    There is normal wall motion post stress.    LV cavity size is  and normal at stress.    The EKG portion of this study is positive for ischemia.    The patient reported chest pain during the stress test.    No valid procedures specified.    PHYSICAL EXAM  CONSTITUTIONAL: Well built, well nourished in no apparent distress  NECK: no carotid bruit, no JVD  LUNGS: CTA  CHEST WALL: no tenderness  HEART: regular rate and rhythm, S1, S2 normal, no murmur, click, rub or gallop   ABDOMEN: soft, non-tender; bowel  sounds normal; no masses,  no organomegaly  EXTREMITIES: Extremities normal, no edema, no calf tenderness noted  NEURO: AAO X 3    I HAVE REVIEWED :    The vital signs, nurses notes, and all the pertinent radiology and labs.    09/27/2024 EKG shows sinus bradycardia with isolated occasional premature complexes rate of 50 beats per minute QTC of 386 milliseconds    Current Outpatient Medications   Medication Instructions    aspirin (ECOTRIN) 81 mg, Oral, Daily    coenzyme Q10 200 mg, Oral, Daily    levothyroxine (SYNTHROID) 88 MCG tablet TAKE 1 TABLET BY MOUTH ONCE A DAY BEFORE BREAKFAST    losartan (COZAAR) 50 mg, Oral, Daily    pantoprazole (PROTONIX) 40 mg, Oral, Daily    rosuvastatin (CRESTOR) 20 mg, Oral, Nightly          Assessment & Plan     1. Coronary artery disease involving native coronary artery of native heart without angina pectoris  Assessment & Plan:  As above free of anginal symptoms.  Continue on aspirin therapy and continue on Crestor for risk factor modification      2. Atherosclerotic heart disease of native coronary artery with other forms of angina pectoris  Assessment & Plan:  Patient was doing very well on the current therapy non-HDL cholesterol is down to 78, encouraged him to continue on present therapy to include Crestor at 20 mg daily.  Continue with cautious dietary intake and daily physical activity as he is participating in wellness program        3. Moderate mixed hyperlipidemia not requiring statin therapy  Assessment & Plan:  Lipids are well controlled at this time and encouraged her to maintain the same regimen including 20 mg of Crestor dietary restrictions as currently practice      4. Hypertension secondary to other renal disorders  Assessment & Plan:  Regarding blood pressure and he may benefit from nifedipine however with hemoglobin A1c and average blood sugar of 123 her prefer that he maintain on Cozaar at this time will continue on 50 mg daily as his tolerating this very  well.  Nifedipine with a mean alternate good alternative to increase the heart rate baseline by about 5 points however he will benefit from a ARB more than calcium blockers      5. Hypothyroidism due to non-medication exogenous substances  Assessment & Plan:  He is maintaining on levothyroxine at 88 mcg a day in the last TSH was within normal range continue the same      6. PVC (premature ventricular contraction)  Assessment & Plan:  Isolated PVCs noted.  Recommend to initiate magnesium therapy with magnesium oxide 400 mg a day and if any sustained palpitations occur then may consider an event recorder at            Follow up in about 6 months (around 3/27/2025).

## 2024-09-27 NOTE — ASSESSMENT & PLAN NOTE
Patient was doing very well on the current therapy non-HDL cholesterol is down to 78, encouraged him to continue on present therapy to include Crestor at 20 mg daily.  Continue with cautious dietary intake and daily physical activity as he is participating in wellness program

## 2024-09-27 NOTE — ASSESSMENT & PLAN NOTE
Lipids are well controlled at this time and encouraged her to maintain the same regimen including 20 mg of Crestor dietary restrictions as currently practice

## 2024-09-27 NOTE — ASSESSMENT & PLAN NOTE
As above free of anginal symptoms.  Continue on aspirin therapy and continue on Crestor for risk factor modification

## 2024-09-27 NOTE — ASSESSMENT & PLAN NOTE
Regarding blood pressure and he may benefit from nifedipine however with hemoglobin A1c and average blood sugar of 123 her prefer that he maintain on Cozaar at this time will continue on 50 mg daily as his tolerating this very well.  Nifedipine with a mean alternate good alternative to increase the heart rate baseline by about 5 points however he will benefit from a ARB more than calcium blockers

## 2024-09-27 NOTE — ASSESSMENT & PLAN NOTE
Isolated PVCs noted.  Recommend to initiate magnesium therapy with magnesium oxide 400 mg a day and if any sustained palpitations occur then may consider an event recorder at

## 2025-03-26 RX ORDER — POLYMYXIN B SULFATE AND TRIMETHOPRIM 1; 10000 MG/ML; [USP'U]/ML
SOLUTION OPHTHALMIC 4 TIMES DAILY
Qty: 10 ML | Refills: 0 | Status: SHIPPED | OUTPATIENT
Start: 2025-03-26

## 2025-05-14 ENCOUNTER — PATIENT MESSAGE (OUTPATIENT)
Dept: UROLOGY | Facility: CLINIC | Age: 65
End: 2025-05-14
Payer: COMMERCIAL

## 2025-05-14 ENCOUNTER — HOSPITAL ENCOUNTER (OUTPATIENT)
Facility: HOSPITAL | Age: 65
Discharge: HOME | End: 2025-05-16
Attending: EMERGENCY MEDICINE | Admitting: INTERNAL MEDICINE
Payer: COMMERCIAL

## 2025-05-14 DIAGNOSIS — R10.9 FLANK PAIN: ICD-10-CM

## 2025-05-14 DIAGNOSIS — N20.1 URETEROLITHIASIS: Primary | ICD-10-CM

## 2025-05-14 DIAGNOSIS — N23 URETERAL COLIC: ICD-10-CM

## 2025-05-14 DIAGNOSIS — R07.9 CHEST PAIN: ICD-10-CM

## 2025-05-14 PROBLEM — N39.0 UTI (URINARY TRACT INFECTION): Status: ACTIVE | Noted: 2025-05-14

## 2025-05-14 PROBLEM — D72.829 LEUCOCYTOSIS: Status: ACTIVE | Noted: 2025-05-14

## 2025-05-14 PROBLEM — C61 PROSTATE CANCER: Status: ACTIVE | Noted: 2025-05-14

## 2025-05-14 LAB
ABSOLUTE EOSINOPHIL (SMH): 0.15 K/UL
ABSOLUTE MONOCYTE (SMH): 0.62 K/UL (ref 0.3–1)
ABSOLUTE NEUTROPHIL COUNT (SMH): 18.8 K/UL (ref 1.8–7.7)
ALBUMIN SERPL-MCNC: 4.6 G/DL (ref 3.5–5.2)
ALP SERPL-CCNC: 56 UNIT/L (ref 55–135)
ALT SERPL-CCNC: 20 UNIT/L (ref 10–44)
ANION GAP (SMH): 9 MMOL/L (ref 8–16)
AST SERPL-CCNC: 20 UNIT/L (ref 10–40)
BASOPHILS # BLD AUTO: 0.12 K/UL
BASOPHILS NFR BLD AUTO: 0.5 %
BILIRUB SERPL-MCNC: 1.1 MG/DL (ref 0.1–1)
BILIRUB UR QL STRIP.AUTO: NEGATIVE
BUN SERPL-MCNC: 18 MG/DL (ref 8–23)
CALCIUM SERPL-MCNC: 9.6 MG/DL (ref 8.7–10.5)
CHLORIDE SERPL-SCNC: 106 MMOL/L (ref 95–110)
CLARITY UR: ABNORMAL
CO2 SERPL-SCNC: 22 MMOL/L (ref 23–29)
COLOR UR AUTO: COLORLESS
CREAT SERPL-MCNC: 1.1 MG/DL (ref 0.5–1.4)
ERYTHROCYTE [DISTWIDTH] IN BLOOD BY AUTOMATED COUNT: 12.5 % (ref 11.5–14.5)
GFR SERPLBLD CREATININE-BSD FMLA CKD-EPI: >60 ML/MIN/1.73/M2
GLUCOSE SERPL-MCNC: 192 MG/DL (ref 70–110)
GLUCOSE UR QL STRIP: ABNORMAL
HCT VFR BLD AUTO: 41.1 % (ref 40–54)
HGB BLD-MCNC: 14.3 GM/DL (ref 14–18)
HGB UR QL STRIP: ABNORMAL
IMM GRANULOCYTES # BLD AUTO: 0.23 K/UL (ref 0–0.04)
IMM GRANULOCYTES NFR BLD AUTO: 1 % (ref 0–0.5)
KETONES UR QL STRIP: ABNORMAL
LEUKOCYTE ESTERASE UR QL STRIP: NEGATIVE
LIPASE SERPL-CCNC: 31 U/L (ref 4–60)
LYMPHOCYTES # BLD AUTO: 3.92 K/UL (ref 1–4.8)
MCH RBC QN AUTO: 29.7 PG (ref 27–31)
MCHC RBC AUTO-ENTMCNC: 34.8 G/DL (ref 32–36)
MCV RBC AUTO: 85 FL (ref 82–98)
MICROSCOPIC COMMENT: ABNORMAL
NITRITE UR QL STRIP: NEGATIVE
NUCLEATED RBC (/100WBC) (SMH): 0 /100 WBC
OHS QRS DURATION: 92 MS
OHS QTC CALCULATION: 402 MS
PH UR STRIP: 8 [PH]
PLATELET # BLD AUTO: 199 K/UL (ref 150–450)
PMV BLD AUTO: 11.1 FL (ref 9.2–12.9)
POTASSIUM SERPL-SCNC: 4.2 MMOL/L (ref 3.5–5.1)
PROT SERPL-MCNC: 7.4 GM/DL (ref 6–8.4)
PROT UR QL STRIP: ABNORMAL
RBC # BLD AUTO: 4.82 M/UL (ref 4.6–6.2)
RBC #/AREA URNS AUTO: >100 /HPF
RELATIVE EOSINOPHIL (SMH): 0.6 % (ref 0–8)
RELATIVE LYMPHOCYTE (SMH): 16.4 % (ref 18–48)
RELATIVE MONOCYTE (SMH): 2.6 % (ref 4–15)
RELATIVE NEUTROPHIL (SMH): 78.9 % (ref 38–73)
SODIUM SERPL-SCNC: 137 MMOL/L (ref 136–145)
SP GR UR STRIP: 1.02
UROBILINOGEN UR STRIP-ACNC: NEGATIVE EU/DL
WBC # BLD AUTO: 23.85 K/UL (ref 3.9–12.7)
YEAST UR QL AUTO: ABNORMAL

## 2025-05-14 PROCEDURE — 99285 EMERGENCY DEPT VISIT HI MDM: CPT | Mod: 25

## 2025-05-14 PROCEDURE — 25000003 PHARM REV CODE 250: Performed by: INTERNAL MEDICINE

## 2025-05-14 PROCEDURE — G0378 HOSPITAL OBSERVATION PER HR: HCPCS

## 2025-05-14 PROCEDURE — 85025 COMPLETE CBC W/AUTO DIFF WBC: CPT | Performed by: EMERGENCY MEDICINE

## 2025-05-14 PROCEDURE — 83690 ASSAY OF LIPASE: CPT | Performed by: EMERGENCY MEDICINE

## 2025-05-14 PROCEDURE — 25000003 PHARM REV CODE 250: Performed by: STUDENT IN AN ORGANIZED HEALTH CARE EDUCATION/TRAINING PROGRAM

## 2025-05-14 PROCEDURE — 96375 TX/PRO/DX INJ NEW DRUG ADDON: CPT

## 2025-05-14 PROCEDURE — 87040 BLOOD CULTURE FOR BACTERIA: CPT | Performed by: INTERNAL MEDICINE

## 2025-05-14 PROCEDURE — 81001 URINALYSIS AUTO W/SCOPE: CPT | Performed by: EMERGENCY MEDICINE

## 2025-05-14 PROCEDURE — 96361 HYDRATE IV INFUSION ADD-ON: CPT

## 2025-05-14 PROCEDURE — 36415 COLL VENOUS BLD VENIPUNCTURE: CPT | Performed by: INTERNAL MEDICINE

## 2025-05-14 PROCEDURE — 80053 COMPREHEN METABOLIC PANEL: CPT | Performed by: EMERGENCY MEDICINE

## 2025-05-14 PROCEDURE — 87086 URINE CULTURE/COLONY COUNT: CPT | Performed by: INTERNAL MEDICINE

## 2025-05-14 PROCEDURE — 96374 THER/PROPH/DIAG INJ IV PUSH: CPT

## 2025-05-14 PROCEDURE — 63600175 PHARM REV CODE 636 W HCPCS: Performed by: EMERGENCY MEDICINE

## 2025-05-14 RX ORDER — KETOROLAC TROMETHAMINE 30 MG/ML
15 INJECTION, SOLUTION INTRAMUSCULAR; INTRAVENOUS EVERY 6 HOURS PRN
Status: DISCONTINUED | OUTPATIENT
Start: 2025-05-14 | End: 2025-05-15

## 2025-05-14 RX ORDER — ONDANSETRON HYDROCHLORIDE 2 MG/ML
4 INJECTION, SOLUTION INTRAVENOUS EVERY 6 HOURS PRN
Status: DISCONTINUED | OUTPATIENT
Start: 2025-05-14 | End: 2025-05-16 | Stop reason: HOSPADM

## 2025-05-14 RX ORDER — HYDROMORPHONE HYDROCHLORIDE 1 MG/ML
0.5 INJECTION, SOLUTION INTRAMUSCULAR; INTRAVENOUS; SUBCUTANEOUS EVERY 6 HOURS PRN
Refills: 0 | Status: DISCONTINUED | OUTPATIENT
Start: 2025-05-14 | End: 2025-05-16 | Stop reason: HOSPADM

## 2025-05-14 RX ORDER — ALUMINUM HYDROXIDE, MAGNESIUM HYDROXIDE, AND SIMETHICONE 1200; 120; 1200 MG/30ML; MG/30ML; MG/30ML
30 SUSPENSION ORAL 4 TIMES DAILY PRN
Status: DISCONTINUED | OUTPATIENT
Start: 2025-05-14 | End: 2025-05-16 | Stop reason: HOSPADM

## 2025-05-14 RX ORDER — CEFTRIAXONE 1 G/1
1 INJECTION, POWDER, FOR SOLUTION INTRAMUSCULAR; INTRAVENOUS
Status: DISCONTINUED | OUTPATIENT
Start: 2025-05-15 | End: 2025-05-16 | Stop reason: HOSPADM

## 2025-05-14 RX ORDER — LANOLIN ALCOHOL/MO/W.PET/CERES
800 CREAM (GRAM) TOPICAL
Status: DISCONTINUED | OUTPATIENT
Start: 2025-05-14 | End: 2025-05-16 | Stop reason: HOSPADM

## 2025-05-14 RX ORDER — FAMOTIDINE 20 MG/1
20 TABLET, FILM COATED ORAL 2 TIMES DAILY
Status: DISCONTINUED | OUTPATIENT
Start: 2025-05-14 | End: 2025-05-16

## 2025-05-14 RX ORDER — SODIUM CHLORIDE 9 MG/ML
INJECTION, SOLUTION INTRAVENOUS CONTINUOUS
Status: DISCONTINUED | OUTPATIENT
Start: 2025-05-14 | End: 2025-05-15

## 2025-05-14 RX ORDER — BISACODYL 10 MG/1
10 SUPPOSITORY RECTAL DAILY PRN
Status: DISCONTINUED | OUTPATIENT
Start: 2025-05-14 | End: 2025-05-14

## 2025-05-14 RX ORDER — ONDANSETRON HYDROCHLORIDE 2 MG/ML
4 INJECTION, SOLUTION INTRAVENOUS
Status: COMPLETED | OUTPATIENT
Start: 2025-05-14 | End: 2025-05-14

## 2025-05-14 RX ORDER — TALC
6 POWDER (GRAM) TOPICAL NIGHTLY PRN
Status: DISCONTINUED | OUTPATIENT
Start: 2025-05-14 | End: 2025-05-16 | Stop reason: HOSPADM

## 2025-05-14 RX ORDER — HYDROCODONE BITARTRATE AND ACETAMINOPHEN 5; 325 MG/1; MG/1
1 TABLET ORAL EVERY 6 HOURS PRN
Refills: 0 | Status: DISCONTINUED | OUTPATIENT
Start: 2025-05-14 | End: 2025-05-16 | Stop reason: HOSPADM

## 2025-05-14 RX ORDER — BISACODYL 5 MG
10 TABLET, DELAYED RELEASE (ENTERIC COATED) ORAL DAILY PRN
Status: DISCONTINUED | OUTPATIENT
Start: 2025-05-14 | End: 2025-05-16 | Stop reason: HOSPADM

## 2025-05-14 RX ORDER — NALOXONE HCL 0.4 MG/ML
0.02 VIAL (ML) INJECTION
Status: DISCONTINUED | OUTPATIENT
Start: 2025-05-14 | End: 2025-05-16 | Stop reason: HOSPADM

## 2025-05-14 RX ORDER — ESOMEPRAZOLE MAGNESIUM 40 MG/1
40 CAPSULE, DELAYED RELEASE ORAL DAILY PRN
COMMUNITY

## 2025-05-14 RX ORDER — ACETAMINOPHEN 325 MG/1
650 TABLET ORAL EVERY 4 HOURS PRN
Status: DISCONTINUED | OUTPATIENT
Start: 2025-05-14 | End: 2025-05-16 | Stop reason: HOSPADM

## 2025-05-14 RX ORDER — KETOROLAC TROMETHAMINE 30 MG/ML
15 INJECTION, SOLUTION INTRAMUSCULAR; INTRAVENOUS
Status: COMPLETED | OUTPATIENT
Start: 2025-05-14 | End: 2025-05-14

## 2025-05-14 RX ORDER — CEFTRIAXONE 1 G/1
1 INJECTION, POWDER, FOR SOLUTION INTRAMUSCULAR; INTRAVENOUS ONCE
Status: COMPLETED | OUTPATIENT
Start: 2025-05-14 | End: 2025-05-14

## 2025-05-14 RX ORDER — PANTOPRAZOLE SODIUM 40 MG/1
40 TABLET, DELAYED RELEASE ORAL DAILY PRN
COMMUNITY

## 2025-05-14 RX ORDER — SODIUM CHLORIDE, SODIUM LACTATE, POTASSIUM CHLORIDE, CALCIUM CHLORIDE 600; 310; 30; 20 MG/100ML; MG/100ML; MG/100ML; MG/100ML
1000 INJECTION, SOLUTION INTRAVENOUS
Status: DISCONTINUED | OUTPATIENT
Start: 2025-05-14 | End: 2025-05-14

## 2025-05-14 RX ORDER — PANTOPRAZOLE SODIUM 40 MG/1
40 TABLET, DELAYED RELEASE ORAL DAILY
Status: DISCONTINUED | OUTPATIENT
Start: 2025-05-15 | End: 2025-05-14

## 2025-05-14 RX ORDER — PANTOPRAZOLE SODIUM 40 MG/1
40 TABLET, DELAYED RELEASE ORAL DAILY PRN
Status: DISCONTINUED | OUTPATIENT
Start: 2025-05-14 | End: 2025-05-16 | Stop reason: HOSPADM

## 2025-05-14 RX ORDER — TAMSULOSIN HYDROCHLORIDE 0.4 MG/1
0.4 CAPSULE ORAL DAILY
Status: DISCONTINUED | OUTPATIENT
Start: 2025-05-14 | End: 2025-05-16 | Stop reason: HOSPADM

## 2025-05-14 RX ORDER — ACETAMINOPHEN 325 MG/1
650 TABLET ORAL EVERY 8 HOURS PRN
Status: DISCONTINUED | OUTPATIENT
Start: 2025-05-14 | End: 2025-05-16 | Stop reason: HOSPADM

## 2025-05-14 RX ORDER — SODIUM,POTASSIUM PHOSPHATES 280-250MG
2 POWDER IN PACKET (EA) ORAL
Status: DISCONTINUED | OUTPATIENT
Start: 2025-05-14 | End: 2025-05-16 | Stop reason: HOSPADM

## 2025-05-14 RX ORDER — HYDROMORPHONE HYDROCHLORIDE 1 MG/ML
0.5 INJECTION, SOLUTION INTRAMUSCULAR; INTRAVENOUS; SUBCUTANEOUS
Refills: 0 | Status: COMPLETED | OUTPATIENT
Start: 2025-05-14 | End: 2025-05-14

## 2025-05-14 RX ORDER — ATORVASTATIN CALCIUM 20 MG/1
20 TABLET, FILM COATED ORAL NIGHTLY
Status: DISCONTINUED | OUTPATIENT
Start: 2025-05-14 | End: 2025-05-16 | Stop reason: HOSPADM

## 2025-05-14 RX ORDER — LEVOTHYROXINE SODIUM 88 UG/1
88 TABLET ORAL
Status: DISCONTINUED | OUTPATIENT
Start: 2025-05-15 | End: 2025-05-16 | Stop reason: HOSPADM

## 2025-05-14 RX ADMIN — BISACODYL 10 MG: 5 TABLET, COATED ORAL at 09:05

## 2025-05-14 RX ADMIN — KETOROLAC TROMETHAMINE 15 MG: 30 INJECTION, SOLUTION INTRAMUSCULAR; INTRAVENOUS at 01:05

## 2025-05-14 RX ADMIN — SODIUM CHLORIDE, POTASSIUM CHLORIDE, SODIUM LACTATE AND CALCIUM CHLORIDE 1000 ML: 600; 310; 30; 20 INJECTION, SOLUTION INTRAVENOUS at 01:05

## 2025-05-14 RX ADMIN — ONDANSETRON 4 MG: 2 INJECTION INTRAMUSCULAR; INTRAVENOUS at 11:05

## 2025-05-14 RX ADMIN — SODIUM CHLORIDE: 9 INJECTION, SOLUTION INTRAVENOUS at 05:05

## 2025-05-14 RX ADMIN — HYDROMORPHONE HYDROCHLORIDE 0.5 MG: 1 INJECTION, SOLUTION INTRAMUSCULAR; INTRAVENOUS; SUBCUTANEOUS at 11:05

## 2025-05-14 RX ADMIN — FAMOTIDINE 20 MG: 20 TABLET, FILM COATED ORAL at 09:05

## 2025-05-14 RX ADMIN — ATORVASTATIN CALCIUM 20 MG: 20 TABLET, FILM COATED ORAL at 09:05

## 2025-05-14 RX ADMIN — CEFTRIAXONE 1 G: 1 INJECTION, POWDER, FOR SOLUTION INTRAMUSCULAR; INTRAVENOUS at 04:05

## 2025-05-14 RX ADMIN — TAMSULOSIN HYDROCHLORIDE 0.4 MG: 0.4 CAPSULE ORAL at 04:05

## 2025-05-14 NOTE — ASSESSMENT & PLAN NOTE
As mentioned in HPI  Imaging showed 3 x 3 mm obstructing left ureteral calculus causing mild left hydroureteronephrosis.   Started on iv fluids  Pain management  Iv rocephin for suspected UTI  Alpha blockers  Mostly stone will pass through  NPO from midnight  Consulted Urology MD

## 2025-05-14 NOTE — ASSESSMENT & PLAN NOTE
Patient's blood pressure range in the last 24 hours was: BP  Min: 127/58  Max: 176/82.The patient's inpatient anti-hypertensive regimen is listed below:  Current Antihypertensives     Stable

## 2025-05-14 NOTE — ED NOTES
"C/O NAUSEA EMESIS AND L FLANK PAIN. PRIVATE ROOM. EVEN AND NON LABORED RESPIRATIONS.  AIRWAY CLEAR.  PULSES REGULAR.  < 3" CAPILLARY REFILL. SKIN WDI.  MAEW.  NON DISTENDED ABDOMEN. ALERT, ORIENTED AND AMBULATORY. CALM AT THIS TIME.  UA REQUESTED. FAMILY AT BS. CALL LIGHT IN REACH.  "

## 2025-05-14 NOTE — H&P
[facility] - Emergency Dept  [facility] Medicine  History & Physical    Patient Name: Nelson Walalce  MRN: [id number]  Patient Class: OP- Observation  Admission Date: 5/14/2025  Attending Physician: Oren Mccormick MD   Primary Care Provider: Andreas Barker MD         Patient information was obtained from patient, past medical records, ER records, and ER MD.     Subjective:     Principal Problem:Ureteral colic    Chief Complaint:   Chief Complaint   Patient presents with    Flank Pain     Left side flank pain nausea and vomiting.         HPI: 64 year old pt getting admitted with ureteric colic and possible UTI  Pt had sudden onset of L Sided abdominal area pain  Mostly on L Flank area with some tenderness on back area  Pt came to ER and imaging studies showed 3 x 3 mm obstructing left ureteral calculus causing mild left hydroureteronephrosis.       Past Medical History:   Diagnosis Date    Allergy     Atherosclerosis of native coronary artery of native heart without angina pectoris     Elevated blood pressure     Elevated PSA     GERD (gastroesophageal reflux disease)     Hyperlipemia     Hypertension     Hypothyroidism     Prostate cancer        Past Surgical History:   Procedure Laterality Date    COLONOSCOPY N/A 11/17/2023    Procedure: COLONOSCOPY;  Surgeon: Chirag Martinez MD;  Location: [facility];  Service: Endoscopy;  Laterality: N/A;    CORONARY ANGIOPLASTY WITH STENT PLACEMENT      CYSTOSCOPY N/A 11/24/2020    Procedure: CYSTOSCOPY;  Surgeon: Gilmer Daly MD;  Location: [facility] OR;  Service: Urology;  Laterality: N/A;    ESOPHAGOGASTRODUODENOSCOPY N/A 11/17/2023    Procedure: EGD (ESOPHAGOGASTRODUODENOSCOPY);  Surgeon: Chirag Martinez MD;  Location: [facility];  Service: Endoscopy;  Laterality: N/A;    TRANSRECTAL BIOPSY OF PROSTATE WITH ULTRASOUND GUIDANCE N/A 11/24/2020    Procedure: BIOPSY, PROSTATE, RECTAL APPROACH, WITH US GUIDANCE;  Surgeon: Gilmer Daly MD;  Location: [facility]  OR;  Service: Urology;  Laterality: N/A;       Review of patient's allergies indicates:  No Known Allergies    No current facility-administered medications on file prior to encounter.     Current Outpatient Medications on File Prior to Encounter   Medication Sig    esomeprazole (NEXIUM) 40 MG capsule Take 40 mg by mouth daily as needed (Heartburn).    pantoprazole (PROTONIX) 40 MG tablet Take 40 mg by mouth daily as needed (Heartburn).    aspirin (ECOTRIN) 81 MG EC tablet Take 1 tablet (81 mg total) by mouth once daily.    levothyroxine (SYNTHROID) 88 MCG tablet TAKE 1 TABLET BY MOUTH ONCE A DAY BEFORE BREAKFAST (Patient taking differently: Take 88 mcg by mouth before breakfast. TAKE 1 TABLET BY MOUTH ONCE A DAY BEFORE BREAKFAST)    losartan (COZAAR) 50 MG tablet Take 1 tablet (50 mg total) by mouth once daily.    rosuvastatin (CRESTOR) 20 MG tablet Take 1 tablet (20 mg total) by mouth every evening.    [DISCONTINUED] coenzyme Q10 100 mg capsule Take 200 mg by mouth once daily.    [DISCONTINUED] magnesium oxide (MAG-OX) 400 mg (241.3 mg magnesium) tablet Take 1 tablet (400 mg total) by mouth once daily.    [DISCONTINUED] pantoprazole (PROTONIX) 40 MG tablet Take 1 tablet (40 mg total) by mouth once daily.    [DISCONTINUED] polymyxin B sulf-trimethoprim (POLYTRIM) 10,000 unit- 1 mg/mL Drop Place into the right eye 4 (four) times daily.     Family History       Problem Relation (Age of Onset)    Hypertension Mother, Father, Brother    Osteoporosis Mother          Tobacco Use    Smoking status: Never    Smokeless tobacco: Never   Substance and Sexual Activity    Alcohol use: Yes     Comment: social    Drug use: No    Sexual activity: Yes     Review of Systems   Constitutional:  Negative for activity change and appetite change.   HENT:  Negative for congestion and dental problem.    Eyes:  Negative for discharge and itching.   Respiratory:  Negative for shortness of breath.    Cardiovascular:  Negative for chest pain.    Gastrointestinal:  Positive for abdominal pain. Negative for abdominal distention.   Endocrine: Negative for cold intolerance.   Genitourinary:  Negative for difficulty urinating and dysuria.   Musculoskeletal:  Negative for arthralgias and back pain.   Skin:  Negative for color change.   Neurological:  Negative for dizziness and facial asymmetry.   Hematological:  Negative for adenopathy.   Psychiatric/Behavioral:  Negative for agitation and behavioral problems.      Objective:     Vital Signs (Most Recent):  Temp: 97.7 °F (36.5 °C) (05/14/25 1130)  Pulse: (!) 55 (05/14/25 1705)  Resp: (!) 24 (05/14/25 1148)  BP: (!) 148/68 (05/14/25 1700)  SpO2: 99 % (05/14/25 1705) Vital Signs (24h Range):  Temp:  [97.7 °F (36.5 °C)] 97.7 °F (36.5 °C)  Pulse:  [55-63] 55  Resp:  [16-24] 24  SpO2:  [97 %-100 %] 99 %  BP: (127-176)/(58-83) 148/68     Weight: 73.5 kg (162 lb)  Body mass index is 23.24 kg/m².     Physical Exam  Vitals and nursing note reviewed.   Constitutional:       Appearance: He is well-developed.   HENT:      Head: Atraumatic.      Right Ear: External ear normal.      Left Ear: External ear normal.      Nose: Nose normal.      Mouth/Throat:      Mouth: Mucous membranes are moist.   Cardiovascular:      Rate and Rhythm: Normal rate.   Pulmonary:      Effort: Pulmonary effort is normal.   Abdominal:      Palpations: Abdomen is soft.   Musculoskeletal:         General: Normal range of motion.      Cervical back: Full passive range of motion without pain and normal range of motion.      Right lower leg: No edema.      Left lower leg: No edema.   Skin:     General: Skin is dry.   Neurological:      Mental Status: He is alert and oriented to person, place, and time.   Psychiatric:         Behavior: Behavior normal.                Significant Labs: All pertinent labs within the past 24 hours have been reviewed.  CBC:   Recent Labs   Lab 05/14/25  1151   WBC 23.85*   HGB 14.3   HCT 41.1        CMP:   Recent  Labs   Lab 05/14/25  1151      K 4.2      CO2 22*   *   BUN 18   CREATININE 1.1   CALCIUM 9.6   PROT 7.4   ALBUMIN 4.6   BILITOT 1.1*   ALKPHOS 56   AST 20   ALT 20   ANIONGAP 9       Significant Imaging: I have reviewed all pertinent imaging results/findings within the past 24 hours.    Assessment/Plan:     Assessment & Plan  Ureteral colic  As mentioned in HPI  Imaging showed 3 x 3 mm obstructing left ureteral calculus causing mild left hydroureteronephrosis.   Started on iv fluids  Pain management  Iv rocephin for suspected UTI  Alpha blockers  Mostly stone will pass through  NPO from midnight  Consulted Urology MD     Hypertension  Patient's blood pressure range in the last 24 hours was: BP  Min: 127/58  Max: 176/82.The patient's inpatient anti-hypertensive regimen is listed below:  Current Antihypertensives     Stable   Hypothyroidism  Maintain Thyroxine    UTI (urinary tract infection)  Maintain iv rocephin  Follow up UC&BC    Prostate cancer  Had TURP in [place]    Leucocytosis  Must be reactive  Has to r/o infection  Blood and urine cultures ordered       VTE Risk Mitigation (From admission, onward)           Ordered     IP VTE HIGH RISK PATIENT  Once         05/14/25 1623     Place sequential compression device  Until discontinued         05/14/25 1623                       On 05/14/2025, patient should be placed in hospital observation services under my care.             Oren Mccormick MD  Department of Hospital Medicine  [facility] - Emergency Dept

## 2025-05-14 NOTE — HPI
64 year old pt getting admitted with ureteric colic and possible UTI  Pt had sudden onset of L Sided abdominal area pain  Mostly on L Flank area with some tenderness on back area  Pt came to ER and imaging studies showed 3 x 3 mm obstructing left ureteral calculus causing mild left hydroureteronephrosis.

## 2025-05-14 NOTE — ED PROVIDER NOTES
Encounter Date: 5/14/2025       History     Chief Complaint   Patient presents with    Flank Pain     Left side flank pain nausea and vomiting.      64-year-old male with history of hypertension, hyperlipidemia, previous prostate cancer, coronary artery disease with stent, ureterolithiasis.  Patient presents to the emergency department with complaint of new onset left-sided flank pain which began this morning.  This was associated with nausea vomiting.  Patient states has 8/10 colicky abdominal pain.  He denies dysuria, no fever, no chest pain or shortness of breath.  Denies any other constitutional symptoms.  On arrival patient found with active vomiting at triage.      Review of patient's allergies indicates:  No Known Allergies  Past Medical History:   Diagnosis Date    Allergy     Atherosclerosis of native coronary artery of native heart without angina pectoris     Elevated blood pressure     Elevated PSA     GERD (gastroesophageal reflux disease)     Hyperlipemia     Hypertension     Hypothyroidism     Prostate cancer      Past Surgical History:   Procedure Laterality Date    COLONOSCOPY N/A 11/17/2023    Procedure: COLONOSCOPY;  Surgeon: Chirag Martinez MD;  Location: [facility];  Service: Endoscopy;  Laterality: N/A;    CORONARY ANGIOPLASTY WITH STENT PLACEMENT      CYSTOSCOPY N/A 11/24/2020    Procedure: CYSTOSCOPY;  Surgeon: Gilmer Daly MD;  Location: [facility];  Service: Urology;  Laterality: N/A;    ESOPHAGOGASTRODUODENOSCOPY N/A 11/17/2023    Procedure: EGD (ESOPHAGOGASTRODUODENOSCOPY);  Surgeon: Chirag Martinez MD;  Location: [facility];  Service: Endoscopy;  Laterality: N/A;    TRANSRECTAL BIOPSY OF PROSTATE WITH ULTRASOUND GUIDANCE N/A 11/24/2020    Procedure: BIOPSY, PROSTATE, RECTAL APPROACH, WITH US GUIDANCE;  Surgeon: Gilmer Daly MD;  Location: [facility];  Service: Urology;  Laterality: N/A;     Family History   Problem Relation Name Age of Onset    Osteoporosis Mother       Hypertension Mother      Hypertension Father      Hypertension Brother       Social History[1]  Review of Systems   Constitutional:  Negative for fever.   HENT:  Negative for sore throat.    Respiratory:  Negative for shortness of breath.    Cardiovascular:  Negative for chest pain.   Gastrointestinal:  Positive for nausea and vomiting. Negative for rectal pain.   Genitourinary:  Positive for flank pain. Negative for dysuria, frequency and testicular pain.   Musculoskeletal:  Negative for back pain.   Skin:  Negative for rash.   Neurological:  Negative for weakness.   Hematological:  Does not bruise/bleed easily.       Physical Exam     Initial Vitals [05/14/25 1130]   BP Pulse Resp Temp SpO2   (!) 164/83 (!) 57 16 97.7 °F (36.5 °C) 99 %      MAP       --         Physical Exam    Nursing note and vitals reviewed.  Constitutional: He appears well-developed and well-nourished. He appears distressed.   Active vomiting during examination.   HENT:   Head: Normocephalic and atraumatic.   Nose: Nose normal. Mouth/Throat: Oropharynx is clear and moist.   Eyes: Conjunctivae and EOM are normal. Pupils are equal, round, and reactive to light. No scleral icterus.   Neck: Neck supple.   Normal range of motion.  Cardiovascular:  Normal rate, regular rhythm, normal heart sounds and intact distal pulses.     Exam reveals no gallop and no friction rub.       No murmur heard.  Pulmonary/Chest: Breath sounds normal. No stridor. No respiratory distress.   Abdominal: Abdomen is soft. Bowel sounds are normal. He exhibits no mass. There is no abdominal tenderness. There is no rebound and no guarding.   Musculoskeletal:         General: No edema. Normal range of motion.      Cervical back: Normal range of motion and neck supple.     Lymphadenopathy:     He has no cervical adenopathy.   Neurological: He is alert and oriented to person, place, and time. He has normal strength and normal reflexes. No cranial nerve deficit or sensory deficit.  GCS score is 15. GCS eye subscore is 4. GCS verbal subscore is 5. GCS motor subscore is 6.   Skin: Skin is warm and dry. Capillary refill takes less than 2 seconds. No rash noted.   Psychiatric: He has a normal mood and affect. His behavior is normal. Judgment and thought content normal.         ED Course   Procedures  Labs Reviewed   COMPREHENSIVE METABOLIC PANEL - Abnormal       Result Value    Sodium 137      Potassium 4.2      Chloride 106      CO2 22 (*)     Glucose 192 (*)     BUN 18      Creatinine 1.1      Calcium 9.6      Protein Total 7.4      Albumin 4.6      Bilirubin Total 1.1 (*)     ALP 56      AST 20      ALT 20      Anion Gap 9      eGFR >60     URINALYSIS, REFLEX TO URINE CULTURE - Abnormal    Color, UA Colorless (*)     Appearance, UA Hazy (*)     Spec Grav UA 1.020      pH, UA 8.0      Protein, UA Trace (*)     Glucose, UA 4+ (*)     Ketones, UA 2+ (*)     Blood, UA 3+ (*)     Bilirubin, UA Negative      Urobilinogen, UA Negative      Nitrites, UA Negative      Leukocyte Esterase, UA Negative     CBC WITH DIFFERENTIAL - Abnormal    WBC 23.85 (*)     RBC 4.82      Hgb 14.3      Hct 41.1      MCV 85      MCH 29.7      MCHC 34.8      RDW 12.5      Platelet Count 199      MPV 11.1      Nucleated RBC 0      Neut % 78.9 (*)     Lymph % 16.4 (*)     Mono % 2.6 (*)     Eos % 0.6      Basophil % 0.5      Imm Grans % 1.0 (*)     Neut # 18.8 (*)     Lymph # 3.92      Mono # 0.62      Eos # 0.15      Baso # 0.12      Imm Grans # 0.23 (*)    URINALYSIS MICROSCOPIC - Abnormal    RBC, UA >100 (*)     Yeast, UA Rare (*)     Microscopic Comment       LIPASE - Normal    Lipase Level 31     CBC W/ AUTO DIFFERENTIAL    Narrative:     The following orders were created for panel order CBC auto differential.  Procedure                               Abnormality         Status                     ---------                               -----------         ------                     CBC with Differential[[id number]]        Abnormal            Final result                 Please view results for these tests on the individual orders.   EXTRA TUBES    Narrative:     The following orders were created for panel order EXTRA TUBES.  Procedure                               Abnormality         Status                     ---------                               -----------         ------                     Light Blue Top Hold[[id number]]                             In process                 Lavender Top Hold[[id number]]                               In process                   Please view results for these tests on the individual orders.   LIGHT BLUE TOP HOLD   LAVENDER TOP HOLD        ECG Results              EKG 12-lead (In process)        Collection Time Result Time QRS Duration [facility] QTC Calculation    05/14/25 12:38:53 05/14/25 13:06:10 92 402                     In process by Interface, Lab In [facility] (05/14/25 13:06:14)                   Narrative:    Test Reason : R10.9,    Vent. Rate :  50 BPM     Atrial Rate :  50 BPM     P-R Int : 166 ms          QRS Dur :  92 ms      QT Int : 442 ms       P-R-T Axes :  50  56  65 degrees    QTcB Int : 402 ms    Sinus bradycardia  Otherwise normal ECG  When compared with ECG of 27-Sep-2024 08:44,  Premature ventricular complexes are no longer Present    Referred By: AAAREFERRAL SELF           Confirmed By:                                   Imaging Results              CT Abdomen Pelvis  Without Contrast (Final result)  Result time 05/14/25 12:29:54      Final result by Kareem Pearson MD (05/14/25 12:29:54)                   Impression:      3 x 3 mm obstructing left ureteral calculus causing mild left hydroureteronephrosis.    3 mm nonobstructing right renal calculus.    Incidental findings as above including small sliding hiatal hernia, atherosclerosis, and pancreatic lipoma.      Electronically signed by: Kareem Pearson  Date:    05/14/2025  Time:    12:29               Narrative:     EXAMINATION:  CT ABDOMEN PELVIS WITHOUT CONTRAST    CLINICAL HISTORY:  Left flank pain;    TECHNIQUE:  Axial CT imaging obtained through the abdomen and pelvis without contrast, coronal and sagittal reformatted images    CMS Mandated Quality Data-CT Radiation Dose-436    All CT scans at this facility dose modulation, iterative reconstruction, and or weight-based dosing when appropriate to reduce radiation dose to as low as reasonably achievable.    COMPARISON:  CT urography abdomen and pelvis 06/03/2021    FINDINGS:  Lung bases are clear.    No acute or aggressive osseous abnormality.    On this noncontrast exam, the liver, gallbladder, biliary tree, spleen, and adrenal glands are unremarkable.  Subcentimeter pancreatic head/neck lipoma is stable compared to prior.  Pancreas is otherwise unremarkable.    Left perinephric stranding and mild left hydroureteronephrosis caused by obstructing 3 x 3 mm calculus in the proximal left ureter at the L3 vertebral body level.  Distal left ureter is normal in caliber.  Nonobstructing 3 mm right renal calculus.  No evidence of obstructive uropathy on the right.  Urinary bladder shows no focal abnormality.    Small sliding hiatal hernia.  Stomach is otherwise unremarkable.  No evidence of small-bowel obstruction.  Normal appendix.  No evidence of colitis.    No free fluid or free air within the abdomen or pelvis.  No pathologically enlarged lymph nodes.  Mild aortoiliac atherosclerotic calcification.                                       Medications   lactated ringers bolus 1,000 mL (has no administration in time range)   cefTRIAXone injection 1 g (has no administration in time range)   ondansetron injection 4 mg (4 mg Intravenous Given 5/14/25 1146)   HYDROmorphone injection 0.5 mg (0.5 mg Intravenous Given 5/14/25 1148)   ketorolac injection 15 mg (15 mg Intravenous Given 5/14/25 1348)   lactated ringers bolus 1,000 mL (1,000 mLs Intravenous New Bag 5/14/25 1351)     Medical  Decision Making  Amount and/or Complexity of Data Reviewed  Labs: ordered.  Radiology: ordered.    Risk  Prescription drug management.  Decision regarding hospitalization.               ED Course as of 05/14/25 1558   Wed May 14, 2025   1502 Patient seen evaluated emergency department.  Patient found with leukocytosis [place] 17[address].  Patient found with greater than 100 RBCs.  Creatinine 1.1 BUN 18.  Patient did have persistent vomiting in emergency department.  CT abdomen pelvis revealed a 3 mm left ureteral stone with mild-to-moderate hydronephrosis.  Patient's case was discussed with Urology Dr. Mckeon.  At this time per urology recommendation stent patient be admitted for continual IV hydration.  Pain control.  Nausea control.  Patient to be made NPO at midnight.  Will consider ureteral stent placement in a.m..  Patient also to continuously strain his urine.  Also will begin on antibiotic therapy.  Rocephin given in emergency department.  Awaiting hospital Medicine admission. [RM]      ED Course User Index  [RM] Christo Vargas MD               Medical Decision Making:   Initial Assessment:   64-year-old male with history of hypertension, hyperlipidemia, previous prostate cancer, coronary artery disease with stent, ureterolithiasis.  Patient presents to the emergency department with complaint of new onset left-sided flank pain which began this morning.  This was associated with nausea vomiting.  Patient states has 8/10 colicky abdominal pain.  He denies dysuria, no fever, no chest pain or shortness of breath.  Denies any other constitutional symptoms.  On arrival patient found with active vomiting at triage.    Differential Diagnosis:   Ureterolithiasis, diverticulitis, colitis, enteritis  Clinical Tests:   Lab Tests: Ordered and Reviewed  Radiological Study: Ordered and Reviewed  Medical Tests: Ordered and Reviewed             Clinical Impression:  Final diagnoses:  [R10.9] Flank pain  [N20.1]  Ureterolithiasis (Primary)          ED Disposition Condition    Admit                   [1]   Social History  Tobacco Use    Smoking status: Never    Smokeless tobacco: Never   Substance Use Topics    Alcohol use: Yes     Comment: social    Drug use: No        Christo Vargas MD  05/14/25 [id number]

## 2025-05-14 NOTE — CARE UPDATE
Was reviewing consult list and saw  on list for flank pain/stone    Ct reviewed and it shows 3mm proximal stone with mild hydro and perinephric stranding. No other stones. Small right renal calcification        Wbc 23, cr normal, ua with 3+bld and tr yeast (uncirc and prediabetic)    I spoke with him and told him he may need a stent emergently since he has a white count of 22    He said that he has had no fevers, he received Toradol and no longer has any pain he has passed a stone before and request to try to pass this stone.     ER had already called Dr. Bailon prior to me reviewing the chart and speaking with him.  Dr. Bailon had recommended admission and placing a stent tomorrow if he is still having pain or his white count is higher or he did not pass stone.    Dr. Wallace wants to wait in tried to pass stone further see if his white count come to stone on his own with bolus, antibiotics.  And if not will proceed with a procedure with Dr. Bailon tomorrow    Plan: NPO past midnight, bolus fluids, antibiotics and consult re-contact Dr. Bailon tomorrow to decide if he still needs a procedure     Abdominal Pain, N/V/D

## 2025-05-14 NOTE — SUBJECTIVE & OBJECTIVE
Past Medical History:   Diagnosis Date    Allergy     Atherosclerosis of native coronary artery of native heart without angina pectoris     Elevated blood pressure     Elevated PSA     GERD (gastroesophageal reflux disease)     Hyperlipemia     Hypertension     Hypothyroidism     Prostate cancer        Past Surgical History:   Procedure Laterality Date    COLONOSCOPY N/A 11/17/2023    Procedure: COLONOSCOPY;  Surgeon: Chirag Martinez MD;  Location: [facility] ENDO;  Service: Endoscopy;  Laterality: N/A;    CORONARY ANGIOPLASTY WITH STENT PLACEMENT      CYSTOSCOPY N/A 11/24/2020    Procedure: CYSTOSCOPY;  Surgeon: Gilmer Daly MD;  Location: [facility] OR;  Service: Urology;  Laterality: N/A;    ESOPHAGOGASTRODUODENOSCOPY N/A 11/17/2023    Procedure: EGD (ESOPHAGOGASTRODUODENOSCOPY);  Surgeon: Chirag Martinez MD;  Location: [facility] ENDO;  Service: Endoscopy;  Laterality: N/A;    TRANSRECTAL BIOPSY OF PROSTATE WITH ULTRASOUND GUIDANCE N/A 11/24/2020    Procedure: BIOPSY, PROSTATE, RECTAL APPROACH, WITH US GUIDANCE;  Surgeon: Gilmer Daly MD;  Location: [facility] OR;  Service: Urology;  Laterality: N/A;       Review of patient's allergies indicates:  No Known Allergies    No current facility-administered medications on file prior to encounter.     Current Outpatient Medications on File Prior to Encounter   Medication Sig    esomeprazole (NEXIUM) 40 MG capsule Take 40 mg by mouth daily as needed (Heartburn).    pantoprazole (PROTONIX) 40 MG tablet Take 40 mg by mouth daily as needed (Heartburn).    aspirin (ECOTRIN) 81 MG EC tablet Take 1 tablet (81 mg total) by mouth once daily.    levothyroxine (SYNTHROID) 88 MCG tablet TAKE 1 TABLET BY MOUTH ONCE A DAY BEFORE BREAKFAST (Patient taking differently: Take 88 mcg by mouth before breakfast. TAKE 1 TABLET BY MOUTH ONCE A DAY BEFORE BREAKFAST)    losartan (COZAAR) 50 MG tablet Take 1 tablet (50 mg total) by mouth once daily.    rosuvastatin (CRESTOR) 20 MG tablet Take 1  tablet (20 mg total) by mouth every evening.    [DISCONTINUED] coenzyme Q10 100 mg capsule Take 200 mg by mouth once daily.    [DISCONTINUED] magnesium oxide (MAG-OX) 400 mg (241.3 mg magnesium) tablet Take 1 tablet (400 mg total) by mouth once daily.    [DISCONTINUED] pantoprazole (PROTONIX) 40 MG tablet Take 1 tablet (40 mg total) by mouth once daily.    [DISCONTINUED] polymyxin B sulf-trimethoprim (POLYTRIM) 10,000 unit- 1 mg/mL Drop Place into the right eye 4 (four) times daily.     Family History       Problem Relation (Age of Onset)    Hypertension Mother, Father, Brother    Osteoporosis Mother          Tobacco Use    Smoking status: Never    Smokeless tobacco: Never   Substance and Sexual Activity    Alcohol use: Yes     Comment: social    Drug use: No    Sexual activity: Yes     Review of Systems   Constitutional:  Negative for activity change and appetite change.   HENT:  Negative for congestion and dental problem.    Eyes:  Negative for discharge and itching.   Respiratory:  Negative for shortness of breath.    Cardiovascular:  Negative for chest pain.   Gastrointestinal:  Positive for abdominal pain. Negative for abdominal distention.   Endocrine: Negative for cold intolerance.   Genitourinary:  Negative for difficulty urinating and dysuria.   Musculoskeletal:  Negative for arthralgias and back pain.   Skin:  Negative for color change.   Neurological:  Negative for dizziness and facial asymmetry.   Hematological:  Negative for adenopathy.   Psychiatric/Behavioral:  Negative for agitation and behavioral problems.      Objective:     Vital Signs (Most Recent):  Temp: 97.7 °F (36.5 °C) (05/14/25 1130)  Pulse: (!) 55 (05/14/25 1705)  Resp: (!) 24 (05/14/25 1148)  BP: (!) 148/68 (05/14/25 1700)  SpO2: 99 % (05/14/25 1705) Vital Signs (24h Range):  Temp:  [97.7 °F (36.5 °C)] 97.7 °F (36.5 °C)  Pulse:  [55-63] 55  Resp:  [16-24] 24  SpO2:  [97 %-100 %] 99 %  BP: (127-176)/(58-83) 148/68     Weight: 73.5 kg (162  lb)  Body mass index is 23.24 kg/m².     Physical Exam  Vitals and nursing note reviewed.   Constitutional:       Appearance: He is well-developed.   HENT:      Head: Atraumatic.      Right Ear: External ear normal.      Left Ear: External ear normal.      Nose: Nose normal.      Mouth/Throat:      Mouth: Mucous membranes are moist.   Cardiovascular:      Rate and Rhythm: Normal rate.   Pulmonary:      Effort: Pulmonary effort is normal.   Abdominal:      Palpations: Abdomen is soft.   Musculoskeletal:         General: Normal range of motion.      Cervical back: Full passive range of motion without pain and normal range of motion.      Right lower leg: No edema.      Left lower leg: No edema.   Skin:     General: Skin is dry.   Neurological:      Mental Status: He is alert and oriented to person, place, and time.   Psychiatric:         Behavior: Behavior normal.                Significant Labs: All pertinent labs within the past 24 hours have been reviewed.  CBC:   Recent Labs   Lab 05/14/25  1151   WBC 23.85*   HGB 14.3   HCT 41.1        CMP:   Recent Labs   Lab 05/14/25  1151      K 4.2      CO2 22*   *   BUN 18   CREATININE 1.1   CALCIUM 9.6   PROT 7.4   ALBUMIN 4.6   BILITOT 1.1*   ALKPHOS 56   AST 20   ALT 20   ANIONGAP 9       Significant Imaging: I have reviewed all pertinent imaging results/findings within the past 24 hours.

## 2025-05-15 ENCOUNTER — ANESTHESIA EVENT (OUTPATIENT)
Dept: SURGERY | Facility: HOSPITAL | Age: 65
End: 2025-05-15
Payer: COMMERCIAL

## 2025-05-15 ENCOUNTER — ANESTHESIA (OUTPATIENT)
Dept: SURGERY | Facility: HOSPITAL | Age: 65
End: 2025-05-15
Payer: COMMERCIAL

## 2025-05-15 PROBLEM — N17.9 AKI (ACUTE KIDNEY INJURY): Status: ACTIVE | Noted: 2025-05-15

## 2025-05-15 LAB
ABSOLUTE EOSINOPHIL (SMH): 0.08 K/UL
ABSOLUTE MONOCYTE (SMH): 0.96 K/UL (ref 0.3–1)
ABSOLUTE NEUTROPHIL COUNT (SMH): 11.5 K/UL (ref 1.8–7.7)
ALBUMIN SERPL-MCNC: 3.8 G/DL (ref 3.5–5.2)
ALP SERPL-CCNC: 51 UNIT/L (ref 55–135)
ALT SERPL-CCNC: 15 UNIT/L (ref 10–44)
ANION GAP (SMH): 9 MMOL/L (ref 8–16)
AST SERPL-CCNC: 20 UNIT/L (ref 10–40)
BASOPHILS # BLD AUTO: 0.06 K/UL
BASOPHILS NFR BLD AUTO: 0.4 %
BILIRUB SERPL-MCNC: 1.2 MG/DL (ref 0.1–1)
BUN SERPL-MCNC: 17 MG/DL (ref 8–23)
CALCIUM SERPL-MCNC: 8.8 MG/DL (ref 8.7–10.5)
CHLORIDE SERPL-SCNC: 111 MMOL/L (ref 95–110)
CO2 SERPL-SCNC: 20 MMOL/L (ref 23–29)
CREAT SERPL-MCNC: 1.4 MG/DL (ref 0.5–1.4)
ERYTHROCYTE [DISTWIDTH] IN BLOOD BY AUTOMATED COUNT: 12.4 % (ref 11.5–14.5)
GFR SERPLBLD CREATININE-BSD FMLA CKD-EPI: 56 ML/MIN/1.73/M2
GLUCOSE SERPL-MCNC: 145 MG/DL (ref 70–110)
HCT VFR BLD AUTO: 38.5 % (ref 40–54)
HGB BLD-MCNC: 13.1 GM/DL (ref 14–18)
IMM GRANULOCYTES # BLD AUTO: 0.05 K/UL (ref 0–0.04)
IMM GRANULOCYTES NFR BLD AUTO: 0.3 % (ref 0–0.5)
LYMPHOCYTES # BLD AUTO: 3.06 K/UL (ref 1–4.8)
MAGNESIUM SERPL-MCNC: 1.9 MG/DL (ref 1.6–2.6)
MCH RBC QN AUTO: 29 PG (ref 27–31)
MCHC RBC AUTO-ENTMCNC: 34 G/DL (ref 32–36)
MCV RBC AUTO: 85 FL (ref 82–98)
NUCLEATED RBC (/100WBC) (SMH): 0 /100 WBC
PLATELET # BLD AUTO: 158 K/UL (ref 150–450)
PMV BLD AUTO: 11.1 FL (ref 9.2–12.9)
POTASSIUM SERPL-SCNC: 4.3 MMOL/L (ref 3.5–5.1)
PROT SERPL-MCNC: 6.3 GM/DL (ref 6–8.4)
RBC # BLD AUTO: 4.51 M/UL (ref 4.6–6.2)
RELATIVE EOSINOPHIL (SMH): 0.5 % (ref 0–8)
RELATIVE LYMPHOCYTE (SMH): 19.5 % (ref 18–48)
RELATIVE MONOCYTE (SMH): 6.1 % (ref 4–15)
RELATIVE NEUTROPHIL (SMH): 73.2 % (ref 38–73)
SODIUM SERPL-SCNC: 140 MMOL/L (ref 136–145)
WBC # BLD AUTO: 15.68 K/UL (ref 3.9–12.7)

## 2025-05-15 PROCEDURE — 63600175 PHARM REV CODE 636 W HCPCS: Performed by: NURSE ANESTHETIST, CERTIFIED REGISTERED

## 2025-05-15 PROCEDURE — 63600175 PHARM REV CODE 636 W HCPCS: Performed by: INTERNAL MEDICINE

## 2025-05-15 PROCEDURE — C1769 GUIDE WIRE: HCPCS | Performed by: SPECIALIST

## 2025-05-15 PROCEDURE — 36000707: Performed by: SPECIALIST

## 2025-05-15 PROCEDURE — 80053 COMPREHEN METABOLIC PANEL: CPT | Performed by: INTERNAL MEDICINE

## 2025-05-15 PROCEDURE — 96376 TX/PRO/DX INJ SAME DRUG ADON: CPT

## 2025-05-15 PROCEDURE — 71000039 HC RECOVERY, EACH ADD'L HOUR: Performed by: SPECIALIST

## 2025-05-15 PROCEDURE — 37000008 HC ANESTHESIA 1ST 15 MINUTES: Performed by: SPECIALIST

## 2025-05-15 PROCEDURE — 83735 ASSAY OF MAGNESIUM: CPT | Performed by: INTERNAL MEDICINE

## 2025-05-15 PROCEDURE — C1758 CATHETER, URETERAL: HCPCS | Performed by: SPECIALIST

## 2025-05-15 PROCEDURE — 36415 COLL VENOUS BLD VENIPUNCTURE: CPT | Performed by: INTERNAL MEDICINE

## 2025-05-15 PROCEDURE — 96361 HYDRATE IV INFUSION ADD-ON: CPT

## 2025-05-15 PROCEDURE — 25500020 PHARM REV CODE 255: Performed by: SPECIALIST

## 2025-05-15 PROCEDURE — G0378 HOSPITAL OBSERVATION PER HR: HCPCS

## 2025-05-15 PROCEDURE — 36000706: Performed by: SPECIALIST

## 2025-05-15 PROCEDURE — 71000033 HC RECOVERY, INTIAL HOUR: Performed by: SPECIALIST

## 2025-05-15 PROCEDURE — 25000003 PHARM REV CODE 250: Performed by: ANESTHESIOLOGY

## 2025-05-15 PROCEDURE — 85025 COMPLETE CBC W/AUTO DIFF WBC: CPT | Performed by: INTERNAL MEDICINE

## 2025-05-15 PROCEDURE — 25000003 PHARM REV CODE 250: Performed by: INTERNAL MEDICINE

## 2025-05-15 PROCEDURE — C2617 STENT, NON-COR, TEM W/O DEL: HCPCS | Performed by: SPECIALIST

## 2025-05-15 PROCEDURE — 37000009 HC ANESTHESIA EA ADD 15 MINS: Performed by: SPECIALIST

## 2025-05-15 PROCEDURE — 63600175 PHARM REV CODE 636 W HCPCS: Mod: JZ | Performed by: INTERNAL MEDICINE

## 2025-05-15 DEVICE — STENT ASCERTA URET 4.8FX26CM: Type: IMPLANTABLE DEVICE | Site: URETER | Status: FUNCTIONAL

## 2025-05-15 RX ORDER — FENTANYL CITRATE 50 UG/ML
INJECTION, SOLUTION INTRAMUSCULAR; INTRAVENOUS
Status: DISCONTINUED | OUTPATIENT
Start: 2025-05-15 | End: 2025-05-15

## 2025-05-15 RX ORDER — PROPOFOL 10 MG/ML
VIAL (ML) INTRAVENOUS
Status: DISCONTINUED | OUTPATIENT
Start: 2025-05-15 | End: 2025-05-15

## 2025-05-15 RX ORDER — GLUCAGON 1 MG
1 KIT INJECTION
Status: DISCONTINUED | OUTPATIENT
Start: 2025-05-15 | End: 2025-05-15 | Stop reason: HOSPADM

## 2025-05-15 RX ORDER — HYDROMORPHONE HYDROCHLORIDE 1 MG/ML
0.2 INJECTION, SOLUTION INTRAMUSCULAR; INTRAVENOUS; SUBCUTANEOUS EVERY 5 MIN PRN
Status: DISCONTINUED | OUTPATIENT
Start: 2025-05-15 | End: 2025-05-15 | Stop reason: HOSPADM

## 2025-05-15 RX ORDER — ONDANSETRON HYDROCHLORIDE 2 MG/ML
4 INJECTION, SOLUTION INTRAVENOUS DAILY PRN
Status: DISCONTINUED | OUTPATIENT
Start: 2025-05-15 | End: 2025-05-15 | Stop reason: HOSPADM

## 2025-05-15 RX ORDER — PHENAZOPYRIDINE HYDROCHLORIDE 100 MG/1
200 TABLET, FILM COATED ORAL ONCE
Status: COMPLETED | OUTPATIENT
Start: 2025-05-15 | End: 2025-05-15

## 2025-05-15 RX ORDER — OXYCODONE HYDROCHLORIDE 5 MG/1
5 TABLET ORAL
Status: DISCONTINUED | OUTPATIENT
Start: 2025-05-15 | End: 2025-05-15 | Stop reason: HOSPADM

## 2025-05-15 RX ORDER — ONDANSETRON HYDROCHLORIDE 2 MG/ML
INJECTION, SOLUTION INTRAMUSCULAR; INTRAVENOUS
Status: DISCONTINUED | OUTPATIENT
Start: 2025-05-15 | End: 2025-05-15

## 2025-05-15 RX ORDER — DIPHENHYDRAMINE HYDROCHLORIDE 50 MG/ML
12.5 INJECTION, SOLUTION INTRAMUSCULAR; INTRAVENOUS
Status: DISCONTINUED | OUTPATIENT
Start: 2025-05-15 | End: 2025-05-15 | Stop reason: HOSPADM

## 2025-05-15 RX ORDER — FAMOTIDINE 10 MG/ML
INJECTION, SOLUTION INTRAVENOUS
Status: DISCONTINUED | OUTPATIENT
Start: 2025-05-15 | End: 2025-05-15

## 2025-05-15 RX ORDER — ACETAMINOPHEN 10 MG/ML
INJECTION, SOLUTION INTRAVENOUS
Status: DISCONTINUED | OUTPATIENT
Start: 2025-05-15 | End: 2025-05-15

## 2025-05-15 RX ORDER — SODIUM CHLORIDE, SODIUM LACTATE, POTASSIUM CHLORIDE, CALCIUM CHLORIDE 600; 310; 30; 20 MG/100ML; MG/100ML; MG/100ML; MG/100ML
INJECTION, SOLUTION INTRAVENOUS CONTINUOUS
Status: DISCONTINUED | OUTPATIENT
Start: 2025-05-15 | End: 2025-05-16 | Stop reason: HOSPADM

## 2025-05-15 RX ORDER — LIDOCAINE HYDROCHLORIDE 20 MG/ML
INJECTION, SOLUTION EPIDURAL; INFILTRATION; INTRACAUDAL; PERINEURAL
Status: DISCONTINUED | OUTPATIENT
Start: 2025-05-15 | End: 2025-05-15

## 2025-05-15 RX ORDER — PHENYLEPHRINE HYDROCHLORIDE 10 MG/ML
INJECTION INTRAVENOUS
Status: DISCONTINUED | OUTPATIENT
Start: 2025-05-15 | End: 2025-05-15

## 2025-05-15 RX ORDER — MIDAZOLAM HYDROCHLORIDE 1 MG/ML
INJECTION INTRAMUSCULAR; INTRAVENOUS
Status: DISCONTINUED | OUTPATIENT
Start: 2025-05-15 | End: 2025-05-15

## 2025-05-15 RX ADMIN — ATORVASTATIN CALCIUM 20 MG: 20 TABLET, FILM COATED ORAL at 09:05

## 2025-05-15 RX ADMIN — SODIUM CHLORIDE, SODIUM LACTATE, POTASSIUM CHLORIDE, AND CALCIUM CHLORIDE: .6; .31; .03; .02 INJECTION, SOLUTION INTRAVENOUS at 11:05

## 2025-05-15 RX ADMIN — FAMOTIDINE 20 MG: 20 TABLET, FILM COATED ORAL at 09:05

## 2025-05-15 RX ADMIN — LIDOCAINE HYDROCHLORIDE 60 MG: 20 INJECTION, SOLUTION INTRAVENOUS at 11:05

## 2025-05-15 RX ADMIN — ONDANSETRON 4 MG: 2 INJECTION INTRAMUSCULAR; INTRAVENOUS at 11:05

## 2025-05-15 RX ADMIN — FAMOTIDINE 20 MG: 10 INJECTION, SOLUTION INTRAVENOUS at 12:05

## 2025-05-15 RX ADMIN — FENTANYL CITRATE 50 MCG: 0.05 INJECTION, SOLUTION INTRAMUSCULAR; INTRAVENOUS at 11:05

## 2025-05-15 RX ADMIN — ACETAMINOPHEN 1000 MG: 10 INJECTION, SOLUTION INTRAVENOUS at 11:05

## 2025-05-15 RX ADMIN — CEFTRIAXONE 2 G: 1 INJECTION, POWDER, FOR SOLUTION INTRAMUSCULAR; INTRAVENOUS at 11:05

## 2025-05-15 RX ADMIN — KETOROLAC TROMETHAMINE 15 MG: 30 INJECTION, SOLUTION INTRAMUSCULAR; INTRAVENOUS at 05:05

## 2025-05-15 RX ADMIN — PROPOFOL 180 MG: 10 INJECTION, EMULSION INTRAVENOUS at 11:05

## 2025-05-15 RX ADMIN — CEFTRIAXONE 1 G: 1 INJECTION, POWDER, FOR SOLUTION INTRAMUSCULAR; INTRAVENOUS at 05:05

## 2025-05-15 RX ADMIN — MIDAZOLAM HYDROCHLORIDE 2 MG: 1 INJECTION, SOLUTION INTRAMUSCULAR; INTRAVENOUS at 11:05

## 2025-05-15 RX ADMIN — PHENAZOPYRIDINE 200 MG: 100 TABLET ORAL at 01:05

## 2025-05-15 RX ADMIN — FAMOTIDINE 20 MG: 10 INJECTION, SOLUTION INTRAVENOUS at 11:05

## 2025-05-15 RX ADMIN — PHENYLEPHRINE HYDROCHLORIDE 100 MCG: 10 INJECTION INTRAVENOUS at 12:05

## 2025-05-15 RX ADMIN — GLYCOPYRROLATE 0.3 MG: 0.2 INJECTION, SOLUTION INTRAMUSCULAR; INTRAVENOUS at 12:05

## 2025-05-15 RX ADMIN — SODIUM CHLORIDE, POTASSIUM CHLORIDE, SODIUM LACTATE AND CALCIUM CHLORIDE: 600; 310; 30; 20 INJECTION, SOLUTION INTRAVENOUS at 05:05

## 2025-05-15 NOTE — TRANSFER OF CARE
"Anesthesia Transfer of Care Note    Patient: Nelson Wallace    Procedure(s) Performed: Procedure(s) (LRB):  LITHOTRIPSY, ESWL (Left)  CYSTO, RETROGRADE PYELOGRAM,BALLOON DILATION, STENT PLACEMENT (Left)    Patient location: PACU    Anesthesia Type: general    Transport from OR: Transported from OR on room air with adequate spontaneous ventilation    Post pain: adequate analgesia    Post assessment: no apparent anesthetic complications    Post vital signs: stable    Level of consciousness: awake and alert    Nausea/Vomiting: no nausea/vomiting    Complications: none    Transfer of care protocol was followed      Last vitals: Visit Vitals  /66   Pulse (!) 52   Temp 37.1 °C (98.8 °F) (Oral)   Resp 18   Ht 5' 10" (1.778 m)   Wt 73.5 kg (162 lb)   SpO2 98%   BMI 23.24 kg/m²     "

## 2025-05-15 NOTE — OP NOTE
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 secondary to an upper ureteral stone  Here for lithotripsy      Pre-op Diagnosis:   Left upper ureteral stone    Post-op Diagnosis:  Same    Procedure:  Left ESWL  With cystoscopic retrograde pyelography and stent placement    Anesthesia: General    Description of Procedure:   After consents were obtained the patient was taken to the operating room  Placed in a supine position  Anesthesia is given  Dornier lithotripter is brought into the room  Patient is properly positioned on the lithotripsy probe  The stone is    3     mm, left upper ureter  We began at a KV of 16 and gradually increased to 25     3000      Shocks were delivered    At this point we placed the patient in a frog-leg position  We prepped the penis  We entered the urinary bladder with a cystoscope  I inject contrast into the left ureter  We identify what appears to be a small filling defect in the upper ureter  I did place a Glidewire  And then placed a 5 Macedonian double-J stent easily, and without complication  Telescope was removed  And he goes to recovery room in satisfactory condition            Findings/Key Components:      Successful ESWL, with left ureteral stent placement    Estimated Blood Loss:      Minimal

## 2025-05-15 NOTE — SUBJECTIVE & OBJECTIVE
Interval History:   Patient seen after his procedure.  Currently with mild dysuria but not with flank or abdominal pain.  No chest pain, shortness of breath or lightheadedness.    Review of Systems  Objective:     Vital Signs (Most Recent):  Temp: 98.7 °F (37.1 °C) (05/15/25 1315)  Pulse: (!) 58 (05/15/25 1315)  Resp: 17 (05/15/25 1315)  BP: (!) 144/69 (05/15/25 1315)  SpO2: 100 % (05/15/25 1315) Vital Signs (24h Range):  Temp:  [98 °F (36.7 °C)-98.8 °F (37.1 °C)] 98.7 °F (37.1 °C)  Pulse:  [52-95] 58  Resp:  [12-20] 17  SpO2:  [95 %-100 %] 100 %  BP: (114-154)/(58-76) 144/69     Weight: 73.5 kg (162 lb)  Body mass index is 23.24 kg/m².    Intake/Output Summary (Last 24 hours) at 5/15/2025 1727  Last data filed at 5/15/2025 1258  Gross per 24 hour   Intake 600 ml   Output 625 ml   Net -25 ml         Physical Exam        NAD, A/O 3   RRR   CTAB  Soft nontender nondistended, bowel sounds present   No flank pain   No edema  Significant Labs: All pertinent labs within the past 24 hours have been reviewed.  CBC:   Recent Labs   Lab 05/14/25  1151 05/15/25  0537   WBC 23.85* 15.68*   HGB 14.3 13.1*   HCT 41.1 38.5*    158     CMP:   Recent Labs   Lab 05/14/25  1151 05/15/25  0537    140   K 4.2 4.3    111*   CO2 22* 20*   * 145*   BUN 18 17   CREATININE 1.1 1.4   CALCIUM 9.6 8.8   PROT 7.4 6.3   ALBUMIN 4.6 3.8   BILITOT 1.1* 1.2*   ALKPHOS 56 51*   AST 20 20   ALT 20 15   ANIONGAP 9 9     Magnesium:   Recent Labs   Lab 05/15/25  0537   MG 1.9       Significant Imaging: I have reviewed all pertinent imaging results/findings within the past 24 hours.

## 2025-05-15 NOTE — PLAN OF CARE
Nursing Transfer Note      Reason patient is being transferred: Released from PACU    Transfer To: Room 3115    Transfer via bed    Transported by CATHERINE Jean Baptiste/ CATHERINE Chavarria    Medicines sent: Prescription sent    Any special needs or follow-up needed: None    Chart send with patient: Yes    Notified: spouse

## 2025-05-15 NOTE — ASSESSMENT & PLAN NOTE
-status post lithotripsy and stent placement on 05/15.  Continue ceftriaxone.  Continue Flomax.  IVF continued given NAIN.

## 2025-05-15 NOTE — PLAN OF CARE
05/15/25 0959   Discharge Planning   Assessment Type Discharge Planning Brief Assessment   Resource/Environmental Concerns none   Support Systems Spouse/significant other   Equipment Currently Used at Home none   Current Living Arrangements home   Patient/Family Anticipates Transition to home   Patient/Family Anticipated Services at Transition none   DME Needed Upon Discharge  none   Discharge Plan A Home   Discharge Plan B Home

## 2025-05-15 NOTE — ANESTHESIA PREPROCEDURE EVALUATION
05/15/2025  Nelson Wallace is a 64 y.o., male.           Tobacco Use:  The patient  reports that he has never smoked. He has never used smokeless tobacco.     Results for orders placed or performed during the hospital encounter of 05/14/25   EKG 12-lead    Collection Time: 05/14/25 12:38 PM   Result Value Ref Range    QRS Duration 92 ms    [facility]HS QTC Calculation 402 ms    Narrative    Test Reason : R10.9,    Vent. Rate :  50 BPM     Atrial Rate :  50 BPM     P-R Int : 166 ms          QRS Dur :  92 ms      QT Int : 442 ms       P-R-T Axes :  50  56  65 degrees    QTcB Int : 402 ms    Sinus bradycardia  Otherwise normal ECG  When compared with ECG of 27-Sep-2024 08:44,  Premature ventricular complexes are no longer Present    Referred By: AAAREFERRAL SELF           Confirmed By:         Imaging Results              CT Abdomen Pelvis  Without Contrast (Final result)  Result time 05/14/25 12:29:54      Final result by Kareem Pearson MD (05/14/25 12:29:54)                   Impression:      3 x 3 mm obstructing left ureteral calculus causing mild left hydroureteronephrosis.    3 mm nonobstructing right renal calculus.    Incidental findings as above including small sliding hiatal hernia, atherosclerosis, and pancreatic lipoma.      Electronically signed by: Kareem Pearson  Date:    05/14/2025  Time:    12:29               Narrative:    EXAMINATION:  CT ABDOMEN PELVIS WITHOUT CONTRAST    CLINICAL HISTORY:  Left flank pain;    TECHNIQUE:  Axial CT imaging obtained through the abdomen and pelvis without contrast, coronal and sagittal reformatted images    CMS Mandated Quality Data-CT Radiation Dose-436    All CT scans at this facility dose modulation, iterative reconstruction, and or weight-based dosing when appropriate to reduce radiation dose to as low as reasonably achievable.    COMPARISON:  CT urography abdomen  and pelvis 06/03/2021    FINDINGS:  Lung bases are clear.    No acute or aggressive osseous abnormality.    On this noncontrast exam, the liver, gallbladder, biliary tree, spleen, and adrenal glands are unremarkable.  Subcentimeter pancreatic head/neck lipoma is stable compared to prior.  Pancreas is otherwise unremarkable.    Left perinephric stranding and mild left hydroureteronephrosis caused by obstructing 3 x 3 mm calculus in the proximal left ureter at the L3 vertebral body level.  Distal left ureter is normal in caliber.  Nonobstructing 3 mm right renal calculus.  No evidence of obstructive uropathy on the right.  Urinary bladder shows no focal abnormality.    Small sliding hiatal hernia.  Stomach is otherwise unremarkable.  No evidence of small-bowel obstruction.  Normal appendix.  No evidence of colitis.    No free fluid or free air within the abdomen or pelvis.  No pathologically enlarged lymph nodes.  Mild aortoiliac atherosclerotic calcification.                                       Lab Results   Component Value Date    WBC 15.68 (H) 05/15/2025    HGB 13.1 (L) 05/15/2025    HCT 38.5 (L) 05/15/2025    MCV 85 05/15/2025     05/15/2025     BMP  Lab Results   Component Value Date     05/15/2025    K 4.3 05/15/2025     (H) 05/15/2025    CO2 20 (L) 05/15/2025    BUN 17 05/15/2025    CREATININE 1.4 05/15/2025    CALCIUM 8.8 05/15/2025    ANIONGAP 9 05/15/2025     (H) 05/15/2025     (H) 05/14/2025     04/19/2024       Results for orders placed during the hospital encounter of 02/11/22    Echo    Interpretation Summary  · The left ventricle is normal in size with mild concentric hypertrophy and normal systolic function.  · The estimated ejection fraction is 65%.  · Normal left ventricular diastolic function.  · Normal right ventricular size with normal right ventricular systolic function.  · Mild mitral regurgitation.  · Normal central venous pressure (3 mmHg).  · The  estimated PA systolic pressure is 23 mmHg.         Pre-op Assessment    I have reviewed the Patient Summary Reports.     I have reviewed the Nursing Notes. I have reviewed the NPO Status.   I have reviewed the Medications.     Review of Systems  Anesthesia Hx:  No problems with previous Anesthesia             Denies Family Hx of Anesthesia complications.    Denies Personal Hx of Anesthesia complications.                    Social:  Alcohol Use, Non-Smoker       Hematology/Oncology:  Hematology Normal                  Hematology Comments: Leukocytosis      --  Cancer in past history (prostate):                     EENT/Dental:  EENT/Dental Normal           Cardiovascular:     Hypertension, well controlled   CAD (Followed by Dr. MAGGIE Castillo, no current problems)  asymptomatic CABG/stent (On aspirin 81 mg)        hyperlipidemia   ECG has been reviewed. PVCs resolved.                           Pulmonary:  Pulmonary Normal                       Renal/:  Renal/ Normal    Left ureteral stone   Neoplasm/Tumor, Prostate Cancer, s/p resection.           Hepatic/GI:     GERD, well controlled                Musculoskeletal:  Musculoskeletal Normal                Neurological:  Neurology Normal                                      Endocrine:   Hypothyroidism          Psych:  Psychiatric Normal                    Physical Exam  General: Well nourished, Cooperative, Alert and Oriented    Airway:  Mallampati: II   Mouth Opening: Normal  TM Distance: Normal  Tongue: Normal  Neck ROM: Normal ROM    Dental:  Intact    Chest/Lungs:  Clear to auscultation, Normal Respiratory Rate    Heart:  Rate: Normal  Rhythm: Regular Rhythm        Anesthesia Plan  Type of Anesthesia, risks & benefits discussed:    Anesthesia Type: Gen Natural Airway  Intra-op Monitoring Plan: Standard ASA Monitors  Induction:  IV  Informed Consent: Informed consent signed with the Patient and all parties understand the risks and agree with anesthesia plan.  All  questions answered.   ASA Score: 3  Anesthesia Plan Notes:   LMA   No decadron  Multimodal analgesia with ofirmev 1000 mg, and ketamine 20 mg.  PONV prophylaxis with Pepcid 20 mg IV, and Zofran 4 mg IV.          Ready For Surgery From Anesthesia Perspective.     .

## 2025-05-15 NOTE — ASSESSMENT & PLAN NOTE
Patient's blood pressure range in the last 24 hours was: BP  Min: 114/58  Max: 154/76.The patient's inpatient anti-hypertensive regimen is listed below:  Current Antihypertensives     Stable

## 2025-05-15 NOTE — PROGRESS NOTES
[facility] Medicine  Progress Note    Patient Name: Nelson Wallace  MRN: [id number]  Patient Class: OP- Observation   Admission Date: 5/14/2025  Length of Stay: 0 days  Attending Physician: Raul Chahal MD  Primary Care Provider: Andreas Barker MD        Subjective     Principal Problem:Ureteral colic        HPI:  64 year old pt getting admitted with ureteric colic and possible UTI  Pt had sudden onset of L Sided abdominal area pain  Mostly on L Flank area with some tenderness on back area  Pt came to ER and imaging studies showed 3 x 3 mm obstructing left ureteral calculus causing mild left hydroureteronephrosis.       Overview/Hospital Course:  No notes on file    Interval History:   Patient seen after his procedure.  Currently with mild dysuria but not with flank or abdominal pain.  No chest pain, shortness of breath or lightheadedness.    Review of Systems  Objective:     Vital Signs (Most Recent):  Temp: 98.7 °F (37.1 °C) (05/15/25 1315)  Pulse: (!) 58 (05/15/25 1315)  Resp: 17 (05/15/25 1315)  BP: (!) 144/69 (05/15/25 1315)  SpO2: 100 % (05/15/25 1315) Vital Signs (24h Range):  Temp:  [98 °F (36.7 °C)-98.8 °F (37.1 °C)] 98.7 °F (37.1 °C)  Pulse:  [52-95] 58  Resp:  [12-20] 17  SpO2:  [95 %-100 %] 100 %  BP: (114-154)/(58-76) 144/69     Weight: 73.5 kg (162 lb)  Body mass index is 23.24 kg/m².    Intake/Output Summary (Last 24 hours) at 5/15/2025 1727  Last data filed at 5/15/2025 1258  Gross per 24 hour   Intake 600 ml   Output 625 ml   Net -25 ml         Physical Exam        NAD, A/O 3   RRR   CTAB  Soft nontender nondistended, bowel sounds present   No flank pain   No edema  Significant Labs: All pertinent labs within the past 24 hours have been reviewed.  CBC:   Recent Labs   Lab 05/14/25  1151 05/15/25  0537   WBC 23.85* 15.68*   HGB 14.3 13.1*   HCT 41.1 38.5*    158     CMP:   Recent Labs   Lab 05/14/25  1151 05/15/25  0537    140   K 4.2 4.3    111*   CO2  22* 20*   * 145*   BUN 18 17   CREATININE 1.1 1.4   CALCIUM 9.6 8.8   PROT 7.4 6.3   ALBUMIN 4.6 3.8   BILITOT 1.1* 1.2*   ALKPHOS 56 51*   AST 20 20   ALT 20 15   ANIONGAP 9 9     Magnesium:   Recent Labs   Lab 05/15/25  0537   MG 1.9       Significant Imaging: I have reviewed all pertinent imaging results/findings within the past 24 hours.      Assessment & Plan  Ureteral colic  -status post lithotripsy and stent placement on 05/15.  Continue ceftriaxone.  Continue Flomax.  IVF continued given NAIN.    Hypertension  Patient's blood pressure range in the last 24 hours was: BP  Min: 114/58  Max: 154/76.The patient's inpatient anti-hypertensive regimen is listed below:  Current Antihypertensives     Stable   Hypothyroidism  Maintain home replacement therapy    UTI (urinary tract infection)  Maintain iv rocephin  Follow up UC&BC    Prostate cancer  Had TURP in [place]    Leucocytosis  -ceftriaxone ordered.     NAIN (acute kidney injury)  NAIN is likely due to pre-renal azotemia due to intravascular volume depletion in addition to being obstructive in etiology.. Baseline creatinine is 1.1. Most recent creatinine and eGFR are listed below.  Recent Labs     05/14/25  1151 05/15/25  0537   CREATININE 1.1 1.4   EGFRNORACEVR >60 56*      Plan  - NAIN is worsening. Will continue IV fluids.  Status post stent placement.  - Avoid nephrotoxins and renally dose meds for GFR listed above  - Monitor urine output, serial BMP, and adjust therapy as needed  -   VTE Risk Mitigation (From admission, onward)           Ordered     IP VTE HIGH RISK PATIENT  Once         05/14/25 1623     Place sequential compression device  Until discontinued         05/14/25 1623                    Discharge Planning   DARRICK: 5/16/2025     Code Status: Full Code   Medical Readiness for Discharge Date:   Discharge Plan A: Home                        Raul Chahal MD  Department of Hospital Medicine   [facility]

## 2025-05-15 NOTE — PROGRESS NOTES
Patient undergoes successful lithotripsy with a stent placement  Once he is stable  And ready for discharge he could be sent home later today  With plans to follow up with me next week with a KUB    Should be sent home on oral antibiotics

## 2025-05-15 NOTE — CONSULTS
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

## 2025-05-15 NOTE — ANESTHESIA POSTPROCEDURE EVALUATION
Anesthesia Post Evaluation    Patient: Nelson Wallace    Procedure(s) Performed: Procedure(s) (LRB):  LITHOTRIPSY, ESWL (Left)  CYSTO, RETROGRADE PYELOGRAM,BALLOON DILATION, STENT PLACEMENT (Left)    Final Anesthesia Type: general      Patient location during evaluation: PACU  Patient participation: Yes- Able to Participate  Level of consciousness: awake and alert  Post-procedure vital signs: reviewed and stable  Pain management: adequate  Airway patency: patent    PONV status at discharge: No PONV  Anesthetic complications: no      Cardiovascular status: blood pressure returned to baseline and stable  Respiratory status: unassisted and room air  Hydration status: euvolemic  Follow-up not needed.              Vitals Value Taken Time   /69 05/15/25 13:15   Temp 36.7 °C (98 °F) 05/15/25 12:27   Pulse 59 05/15/25 13:24   Resp 20 05/15/25 13:21   SpO2 98 % 05/15/25 13:23   Vitals shown include unfiled device data.      Event Time   Out of Recovery 05/15/2025 13:23:26         Pain/Fransisco Score: Pain Rating Prior to Med Admin: 6 (5/15/2025  5:46 AM)  Pain Rating Post Med Admin: 3 (5/15/2025  6:16 AM)  Fransisco Score: 10 (5/15/2025  1:15 PM)

## 2025-05-16 VITALS
OXYGEN SATURATION: 97 % | TEMPERATURE: 99 F | DIASTOLIC BLOOD PRESSURE: 57 MMHG | RESPIRATION RATE: 18 BRPM | WEIGHT: 161.81 LBS | SYSTOLIC BLOOD PRESSURE: 142 MMHG | HEART RATE: 58 BPM | BODY MASS INDEX: 23.17 KG/M2 | HEIGHT: 70 IN

## 2025-05-16 DIAGNOSIS — N20.1 CALCULUS OF URETER: Primary | ICD-10-CM

## 2025-05-16 LAB
ABSOLUTE EOSINOPHIL (SMH): 0.29 K/UL
ABSOLUTE MONOCYTE (SMH): 0.51 K/UL (ref 0.3–1)
ABSOLUTE NEUTROPHIL COUNT (SMH): 6.2 K/UL (ref 1.8–7.7)
ALBUMIN SERPL-MCNC: 3.3 G/DL (ref 3.5–5.2)
ALP SERPL-CCNC: 45 UNIT/L (ref 55–135)
ALT SERPL-CCNC: 12 UNIT/L (ref 10–44)
ANION GAP (SMH): 5 MMOL/L (ref 8–16)
AST SERPL-CCNC: 21 UNIT/L (ref 10–40)
BASOPHILS # BLD AUTO: 0.06 K/UL
BASOPHILS NFR BLD AUTO: 0.7 %
BILIRUB SERPL-MCNC: 0.7 MG/DL (ref 0.1–1)
BUN SERPL-MCNC: 15 MG/DL (ref 8–23)
CALCIUM SERPL-MCNC: 8.2 MG/DL (ref 8.7–10.5)
CHLORIDE SERPL-SCNC: 112 MMOL/L (ref 95–110)
CO2 SERPL-SCNC: 24 MMOL/L (ref 23–29)
CREAT SERPL-MCNC: 1.1 MG/DL (ref 0.5–1.4)
ERYTHROCYTE [DISTWIDTH] IN BLOOD BY AUTOMATED COUNT: 12.5 % (ref 11.5–14.5)
GFR SERPLBLD CREATININE-BSD FMLA CKD-EPI: >60 ML/MIN/1.73/M2
GLUCOSE SERPL-MCNC: 118 MG/DL (ref 70–110)
HCT VFR BLD AUTO: 34.1 % (ref 40–54)
HGB BLD-MCNC: 11.7 GM/DL (ref 14–18)
IMM GRANULOCYTES # BLD AUTO: 0.03 K/UL (ref 0–0.04)
IMM GRANULOCYTES NFR BLD AUTO: 0.3 % (ref 0–0.5)
LYMPHOCYTES # BLD AUTO: 1.64 K/UL (ref 1–4.8)
MAGNESIUM SERPL-MCNC: 1.9 MG/DL (ref 1.6–2.6)
MCH RBC QN AUTO: 29.8 PG (ref 27–31)
MCHC RBC AUTO-ENTMCNC: 34.3 G/DL (ref 32–36)
MCV RBC AUTO: 87 FL (ref 82–98)
NUCLEATED RBC (/100WBC) (SMH): 0 /100 WBC
PLATELET # BLD AUTO: 129 K/UL (ref 150–450)
PMV BLD AUTO: 11.4 FL (ref 9.2–12.9)
POTASSIUM SERPL-SCNC: 3.9 MMOL/L (ref 3.5–5.1)
PROT SERPL-MCNC: 5.6 GM/DL (ref 6–8.4)
RBC # BLD AUTO: 3.93 M/UL (ref 4.6–6.2)
RELATIVE EOSINOPHIL (SMH): 3.3 % (ref 0–8)
RELATIVE LYMPHOCYTE (SMH): 18.8 % (ref 18–48)
RELATIVE MONOCYTE (SMH): 5.8 % (ref 4–15)
RELATIVE NEUTROPHIL (SMH): 71.1 % (ref 38–73)
SODIUM SERPL-SCNC: 141 MMOL/L (ref 136–145)
WBC # BLD AUTO: 8.73 K/UL (ref 3.9–12.7)

## 2025-05-16 PROCEDURE — 25000003 PHARM REV CODE 250: Performed by: INTERNAL MEDICINE

## 2025-05-16 PROCEDURE — 63600175 PHARM REV CODE 636 W HCPCS: Performed by: INTERNAL MEDICINE

## 2025-05-16 PROCEDURE — 83735 ASSAY OF MAGNESIUM: CPT | Performed by: INTERNAL MEDICINE

## 2025-05-16 PROCEDURE — 85025 COMPLETE CBC W/AUTO DIFF WBC: CPT | Performed by: INTERNAL MEDICINE

## 2025-05-16 PROCEDURE — G0378 HOSPITAL OBSERVATION PER HR: HCPCS

## 2025-05-16 PROCEDURE — 36415 COLL VENOUS BLD VENIPUNCTURE: CPT | Performed by: INTERNAL MEDICINE

## 2025-05-16 PROCEDURE — 80053 COMPREHEN METABOLIC PANEL: CPT | Performed by: INTERNAL MEDICINE

## 2025-05-16 RX ORDER — CEFPODOXIME PROXETIL 200 MG/1
200 TABLET, FILM COATED ORAL EVERY 12 HOURS
Qty: 26 TABLET | Refills: 0 | Status: SHIPPED | OUTPATIENT
Start: 2025-05-16 | End: 2025-05-29

## 2025-05-16 RX ORDER — TAMSULOSIN HYDROCHLORIDE 0.4 MG/1
0.4 CAPSULE ORAL DAILY
Qty: 30 CAPSULE | Refills: 2 | Status: SHIPPED | OUTPATIENT
Start: 2025-05-17

## 2025-05-16 RX ORDER — FAMOTIDINE 20 MG/1
20 TABLET, FILM COATED ORAL DAILY
Status: DISCONTINUED | OUTPATIENT
Start: 2025-05-16 | End: 2025-05-16 | Stop reason: HOSPADM

## 2025-05-16 RX ADMIN — SODIUM CHLORIDE, POTASSIUM CHLORIDE, SODIUM LACTATE AND CALCIUM CHLORIDE: 600; 310; 30; 20 INJECTION, SOLUTION INTRAVENOUS at 12:05

## 2025-05-16 RX ADMIN — LEVOTHYROXINE SODIUM 88 MCG: 0.09 TABLET ORAL at 05:05

## 2025-05-16 RX ADMIN — TAMSULOSIN HYDROCHLORIDE 0.4 MG: 0.4 CAPSULE ORAL at 08:05

## 2025-05-16 RX ADMIN — FAMOTIDINE 20 MG: 20 TABLET, FILM COATED ORAL at 08:05

## 2025-05-16 NOTE — NURSING
Discharge instructions given to patient. Patient verbalized understanding. PIV removed. Pt leaving with personal belongings. Meds to be picked up from pharmacy. Pt leaving with family.

## 2025-05-16 NOTE — HOSPITAL COURSE
The patient presented with flank pain found to have obstructive nephrolithiasis with NAIN and presumed UTI.     On 05/15 he went for lithotripsy with stent placement by Urology.  Thereafter pain has resolved and he is tolerating oral intake.  He will be discharged on cefpodoxime and Flomax and is to follow with that service as scheduled by that service.      Regarding his NAIN, fluids were administered and stent placed.  With these interventions his renal function has normalized.  He is tolerating oral intake and is normotensive.    The patient is in agreement with being discharged.

## 2025-05-16 NOTE — ASSESSMENT & PLAN NOTE
NAIN is likely due to pre-renal azotemia due to intravascular volume depletion in addition to being obstructive in etiology.. Baseline creatinine is 1.1. Most recent creatinine and eGFR are listed below.  Recent Labs     05/14/25  1151 05/15/25  0537 05/16/25  0445   CREATININE 1.1 1.4 1.1   EGFRNORACEVR >60 56* >60        Plan  - NAIN is improving. S/p IV fluids.  Status post stent placement.  - Avoid nephrotoxins and renally dose meds for GFR listed above  - Monitor urine output, serial BMP, and adjust therapy as needed  -

## 2025-05-16 NOTE — PLAN OF CARE
Patient cleared for discharge by case management to home.        05/16/25 0911   Final Note   Assessment Type Final Discharge Note   Anticipated Discharge Disposition Home   Hospital Resources/Appts/Education Provided Appointment suggestion unavailable

## 2025-05-16 NOTE — ASSESSMENT & PLAN NOTE
Patient's blood pressure range in the last 24 hours was: BP  Min: 127/61  Max: 150/70.The patient's inpatient anti-hypertensive regimen is listed below:  Current Antihypertensives     Resume home regimen

## 2025-05-16 NOTE — PROGRESS NOTES
Pharmacist Renal Adjustment Note    Nelson Wallace is a 64 y.o. male being treated with Famotidine.     Patient Data:    Vital Signs (Most Recent):  Temp: 98.6 °F (37 °C) (05/15/25 2332)  Pulse: (!) 54 (05/15/25 2332)  Resp: 18 (05/15/25 2332)  BP: (!) 136/59 (05/15/25 2332)  SpO2: 98 % (05/15/25 2332) Vital Signs (72h Range):  Temp:  [97.7 °F (36.5 °C)-98.8 °F (37.1 °C)]   Pulse:  [52-95]   Resp:  [12-24]   BP: (114-176)/(58-83)   SpO2:  [95 %-100 %]      Recent Labs   Lab 05/14/25  1151 05/15/25  0537   CREATININE 1.1 1.4     Serum creatinine: 1.4 mg/dL 05/15/25 0537  Estimated creatinine clearance: 55.8 mL/min    Famotidine 20 mg PO every 12 hours will be changed to Famotidine 20 mg PO every 24 hours due to CrCl 30-59 mL/min per Renal Dose Adjustment protocol.     Pharmacist's Name: Shanelle Rivera  Pharmacist's Extension: [phone]

## 2025-05-16 NOTE — ASSESSMENT & PLAN NOTE
-status post lithotripsy and stent placement on 05/15.  Continue ceftriaxone.  Continue Flomax.  IVF continued given NAIN.     Detail Level: Detailed Depth Of Biopsy: dermis Was A Bandage Applied: Yes Size Of Lesion In Cm: 0 Biopsy Type: H and E Biopsy Method: Dermablade Anesthesia Type: 1% lidocaine with epinephrine Anesthesia Volume In Cc (Will Not Render If 0): 1.5 Hemostasis: Drysol Wound Care: Petrolatum Dressing: bandage Type Of Destruction Used: Electrodesiccation Curettage Text: The wound bed was treated with curettage after the biopsy was performed. Cryotherapy Text: The wound bed was treated with cryotherapy after the biopsy was performed. Electrodesiccation Text: The wound bed was treated with electrodesiccation after the biopsy was performed. Electrodesiccation And Curettage Text: The wound bed was treated with electrodesiccation and curettage after the biopsy was performed. Silver Nitrate Text: The wound bed was treated with silver nitrate after the biopsy was performed. Lab: 6 Lab Facility: 3 [facility] Notes In Note?: No Consent: Written consent was obtained and risks were reviewed including but not limited to scarring, infection, bleeding, scabbing, incomplete removal, nerve damage and allergy to anesthesia. Post-Care Instructions: I reviewed with the patient in detail post-care instructions. Patient is to keep the biopsy site dry overnight, and then apply bacitracin twice daily until healed. Patient may apply hydrogen peroxide soaks to remove any crusting. Notification Instructions: Patient will be notified of biopsy results. However, patient instructed to call the office if not contacted within 2 weeks. Billing Type: Third-Party Bill Information: Selecting Yes will display possible errors in your note based on the variables you have selected. This validation is only offered as a suggestion for you. PLEASE NOTE THAT THE VALIDATION TEXT WILL BE REMOVED WHEN YOU FINALIZE YOUR NOTE. IF YOU WANT TO FAX A PRELIMINARY NOTE YOU WILL NEED TO TOGGLE THIS TO 'NO' IF YOU DO NOT WANT IT IN YOUR FAXED NOTE.

## 2025-05-16 NOTE — PLAN OF CARE
Problem: Adult Inpatient Plan of Care  Goal: Plan of Care Review  Outcome: Met  Goal: Patient-Specific Goal (Individualized)  Outcome: Met  Goal: Absence of Hospital-Acquired Illness or Injury  Outcome: Met  Goal: Optimal Comfort and Wellbeing  Outcome: Met  Goal: Readiness for Transition of Care  Outcome: Met     Problem: Wound  Goal: Optimal Coping  Outcome: Met  Goal: Optimal Functional Ability  Outcome: Met  Goal: Absence of Infection Signs and Symptoms  Outcome: Met  Goal: Improved Oral Intake  Outcome: Met  Goal: Optimal Pain Control and Function  Outcome: Met  Goal: Skin Health and Integrity  Outcome: Met  Goal: Optimal Wound Healing  Outcome: Met     Problem: Acute Kidney Injury/Impairment  Goal: Fluid and Electrolyte Balance  Outcome: Met  Goal: Improved Oral Intake  Outcome: Met  Goal: Effective Renal Function  Outcome: Met

## 2025-05-17 LAB — BACTERIA UR CULT: NO GROWTH

## 2025-05-19 ENCOUNTER — PATIENT OUTREACH (OUTPATIENT)
Dept: ADMINISTRATIVE | Facility: CLINIC | Age: 65
End: 2025-05-19
Payer: COMMERCIAL

## 2025-05-19 ENCOUNTER — TELEPHONE (OUTPATIENT)
Dept: FAMILY MEDICINE | Facility: CLINIC | Age: 65
End: 2025-05-19
Payer: COMMERCIAL

## 2025-05-19 DIAGNOSIS — N23 RENAL COLIC: ICD-10-CM

## 2025-05-19 DIAGNOSIS — C61 PROSTATE CANCER: Primary | ICD-10-CM

## 2025-05-19 LAB — BACTERIA BLD CULT: NORMAL

## 2025-05-19 NOTE — TELEPHONE ENCOUNTER
----- Message from Nurse Giang sent at 5/19/2025  4:17 PM CDT -----  Call patient - needs post-hospital phone call within 2 business days and hospital follow up visit scheduled within 7-14 days.

## 2025-05-19 NOTE — PROGRESS NOTES
C3 nurse spoke with Nelson Wallace mariza Maricarmen for a TCC post hospital discharge follow up call. The patient does not have a scheduled HOSFU appointment with Andreas Barker MD  within 5-7 days post hospital discharge date 5/16/25. C3 nurse was unable to schedule HOSFU appointment in [facility].    Message sent to PCP staff requesting they contact patient and schedule follow up appointment.

## 2025-05-19 NOTE — TELEPHONE ENCOUNTER
was in the hospital and released on 5/16/25. He is asking that you order CBC, UA complete , BMP and PSA Diagnostic

## 2025-05-23 ENCOUNTER — LAB VISIT (OUTPATIENT)
Dept: LAB | Facility: HOSPITAL | Age: 65
End: 2025-05-23
Attending: FAMILY MEDICINE
Payer: COMMERCIAL

## 2025-05-23 DIAGNOSIS — C61 PROSTATE CANCER: ICD-10-CM

## 2025-05-23 DIAGNOSIS — E03.2 HYPOTHYROIDISM DUE TO NON-MEDICATION EXOGENOUS SUBSTANCES: ICD-10-CM

## 2025-05-23 DIAGNOSIS — I25.10 CORONARY ARTERY DISEASE INVOLVING NATIVE CORONARY ARTERY OF NATIVE HEART WITHOUT ANGINA PECTORIS: ICD-10-CM

## 2025-05-23 DIAGNOSIS — N23 RENAL COLIC: ICD-10-CM

## 2025-05-23 DIAGNOSIS — E78.2 MODERATE MIXED HYPERLIPIDEMIA NOT REQUIRING STATIN THERAPY: ICD-10-CM

## 2025-05-23 DIAGNOSIS — I15.1 HYPERTENSION SECONDARY TO OTHER RENAL DISORDERS: ICD-10-CM

## 2025-05-23 DIAGNOSIS — I25.118 ATHEROSCLEROTIC HEART DISEASE OF NATIVE CORONARY ARTERY WITH OTHER FORMS OF ANGINA PECTORIS: ICD-10-CM

## 2025-05-23 DIAGNOSIS — I49.3 PVC (PREMATURE VENTRICULAR CONTRACTION): ICD-10-CM

## 2025-05-23 LAB
ABSOLUTE EOSINOPHIL (SMH): 0.56 K/UL
ABSOLUTE MONOCYTE (SMH): 0.48 K/UL (ref 0.3–1)
ABSOLUTE NEUTROPHIL COUNT (SMH): 4.4 K/UL (ref 1.8–7.7)
ALBUMIN SERPL-MCNC: 4.3 G/DL (ref 3.5–5.2)
ALP SERPL-CCNC: 54 UNIT/L (ref 55–135)
ALT SERPL-CCNC: 26 UNIT/L (ref 10–44)
ANION GAP (SMH): 5 MMOL/L (ref 8–16)
AST SERPL-CCNC: 18 UNIT/L (ref 10–40)
BASOPHILS # BLD AUTO: 0.06 K/UL
BASOPHILS NFR BLD AUTO: 0.7 %
BILIRUB SERPL-MCNC: 1 MG/DL (ref 0.1–1)
BUN SERPL-MCNC: 13 MG/DL (ref 8–23)
CALCIUM SERPL-MCNC: 9.3 MG/DL (ref 8.7–10.5)
CHLORIDE SERPL-SCNC: 108 MMOL/L (ref 95–110)
CHOLEST SERPL-MCNC: 163 MG/DL (ref 120–199)
CHOLEST/HDLC SERPL: 4.3 {RATIO} (ref 2–5)
CO2 SERPL-SCNC: 27 MMOL/L (ref 23–29)
CREAT SERPL-MCNC: 0.9 MG/DL (ref 0.5–1.4)
EAG (SMH): 123 MG/DL (ref 68–131)
ERYTHROCYTE [DISTWIDTH] IN BLOOD BY AUTOMATED COUNT: 12.1 % (ref 11.5–14.5)
GFR SERPLBLD CREATININE-BSD FMLA CKD-EPI: >60 ML/MIN/1.73/M2
GLUCOSE SERPL-MCNC: 111 MG/DL (ref 70–110)
HBA1C MFR BLD: 5.9 % (ref 4.5–6.2)
HCT VFR BLD AUTO: 40.1 % (ref 40–54)
HDLC SERPL-MCNC: 38 MG/DL (ref 40–75)
HDLC SERPL: 23.3 % (ref 20–50)
HGB BLD-MCNC: 13.2 GM/DL (ref 14–18)
IMM GRANULOCYTES # BLD AUTO: 0.04 K/UL (ref 0–0.04)
IMM GRANULOCYTES NFR BLD AUTO: 0.5 % (ref 0–0.5)
LDLC SERPL CALC-MCNC: 87.6 MG/DL (ref 63–159)
LYMPHOCYTES # BLD AUTO: 3.06 K/UL (ref 1–4.8)
MAGNESIUM SERPL-MCNC: 2.2 MG/DL (ref 1.6–2.6)
MCH RBC QN AUTO: 29.2 PG (ref 27–31)
MCHC RBC AUTO-ENTMCNC: 32.9 G/DL (ref 32–36)
MCV RBC AUTO: 89 FL (ref 82–98)
NONHDLC SERPL-MCNC: 125 MG/DL
NUCLEATED RBC (/100WBC) (SMH): 0 /100 WBC
PLATELET # BLD AUTO: 181 K/UL (ref 150–450)
PMV BLD AUTO: 11.1 FL (ref 9.2–12.9)
POTASSIUM SERPL-SCNC: 4.3 MMOL/L (ref 3.5–5.1)
PROT SERPL-MCNC: 7.1 GM/DL (ref 6–8.4)
PSA SERPL-MCNC: <0.01 NG/ML (ref ?–4)
RBC # BLD AUTO: 4.52 M/UL (ref 4.6–6.2)
RELATIVE EOSINOPHIL (SMH): 6.5 % (ref 0–8)
RELATIVE LYMPHOCYTE (SMH): 35.5 % (ref 18–48)
RELATIVE MONOCYTE (SMH): 5.6 % (ref 4–15)
RELATIVE NEUTROPHIL (SMH): 51.2 % (ref 38–73)
SODIUM SERPL-SCNC: 140 MMOL/L (ref 136–145)
TRIGL SERPL-MCNC: 187 MG/DL (ref 30–150)
TSH SERPL-ACNC: 1.88 UIU/ML (ref 0.34–5.6)
WBC # BLD AUTO: 8.61 K/UL (ref 3.9–12.7)

## 2025-05-23 PROCEDURE — 82465 ASSAY BLD/SERUM CHOLESTEROL: CPT

## 2025-05-23 PROCEDURE — 84443 ASSAY THYROID STIM HORMONE: CPT

## 2025-05-23 PROCEDURE — 84153 ASSAY OF PSA TOTAL: CPT

## 2025-05-23 PROCEDURE — 82040 ASSAY OF SERUM ALBUMIN: CPT

## 2025-05-23 PROCEDURE — 36415 COLL VENOUS BLD VENIPUNCTURE: CPT

## 2025-05-23 PROCEDURE — 83036 HEMOGLOBIN GLYCOSYLATED A1C: CPT

## 2025-05-23 PROCEDURE — 85025 COMPLETE CBC W/AUTO DIFF WBC: CPT

## 2025-05-23 PROCEDURE — 83735 ASSAY OF MAGNESIUM: CPT

## 2025-05-26 ENCOUNTER — HOSPITAL ENCOUNTER (OUTPATIENT)
Dept: RADIOLOGY | Facility: HOSPITAL | Age: 65
Discharge: HOME | End: 2025-05-26
Attending: SPECIALIST
Payer: COMMERCIAL

## 2025-05-26 DIAGNOSIS — N20.1 CALCULUS OF URETER: ICD-10-CM

## 2025-05-26 PROCEDURE — 74018 RADEX ABDOMEN 1 VIEW: CPT | Mod: 26,,, | Performed by: RADIOLOGY

## 2025-05-26 PROCEDURE — 74018 RADEX ABDOMEN 1 VIEW: CPT | Mod: TC

## 2025-07-29 ENCOUNTER — OFFICE VISIT (OUTPATIENT)
Dept: CARDIOLOGY | Facility: CLINIC | Age: 65
End: 2025-07-29
Payer: COMMERCIAL

## 2025-07-29 VITALS
BODY MASS INDEX: 23.59 KG/M2 | DIASTOLIC BLOOD PRESSURE: 62 MMHG | SYSTOLIC BLOOD PRESSURE: 130 MMHG | OXYGEN SATURATION: 99 % | WEIGHT: 164.38 LBS | HEART RATE: 55 BPM

## 2025-07-29 DIAGNOSIS — I25.10 CORONARY ARTERY DISEASE INVOLVING NATIVE CORONARY ARTERY OF NATIVE HEART WITHOUT ANGINA PECTORIS: Primary | ICD-10-CM

## 2025-07-29 DIAGNOSIS — I10 PRIMARY HYPERTENSION: ICD-10-CM

## 2025-07-29 DIAGNOSIS — I35.1 AORTIC EJECTION MURMUR: ICD-10-CM

## 2025-07-29 DIAGNOSIS — I49.3 PVC (PREMATURE VENTRICULAR CONTRACTION): ICD-10-CM

## 2025-07-29 DIAGNOSIS — E78.2 MIXED HYPERLIPIDEMIA: ICD-10-CM

## 2025-07-29 DIAGNOSIS — R09.89 BRUIT OF RIGHT CAROTID ARTERY: ICD-10-CM

## 2025-07-29 PROCEDURE — 99214 OFFICE O/P EST MOD 30 MIN: CPT | Mod: S$GLB,,, | Performed by: INTERNAL MEDICINE

## 2025-07-29 PROCEDURE — 1159F MED LIST DOCD IN RCRD: CPT | Mod: CPTII,S$GLB,, | Performed by: INTERNAL MEDICINE

## 2025-07-29 PROCEDURE — 3078F DIAST BP <80 MM HG: CPT | Mod: CPTII,S$GLB,, | Performed by: INTERNAL MEDICINE

## 2025-07-29 PROCEDURE — 1160F RVW MEDS BY RX/DR IN RCRD: CPT | Mod: CPTII,S$GLB,, | Performed by: INTERNAL MEDICINE

## 2025-07-29 PROCEDURE — 99999 PR PBB SHADOW E&M-EST. PATIENT-LVL IV: CPT | Mod: PBBFAC,,, | Performed by: INTERNAL MEDICINE

## 2025-07-29 PROCEDURE — 4010F ACE/ARB THERAPY RXD/TAKEN: CPT | Mod: CPTII,S$GLB,, | Performed by: INTERNAL MEDICINE

## 2025-07-29 PROCEDURE — 3044F HG A1C LEVEL LT 7.0%: CPT | Mod: CPTII,S$GLB,, | Performed by: INTERNAL MEDICINE

## 2025-07-29 PROCEDURE — 3008F BODY MASS INDEX DOCD: CPT | Mod: CPTII,S$GLB,, | Performed by: INTERNAL MEDICINE

## 2025-07-29 PROCEDURE — 3075F SYST BP GE 130 - 139MM HG: CPT | Mod: CPTII,S$GLB,, | Performed by: INTERNAL MEDICINE

## 2025-07-29 RX ORDER — RANOLAZINE 500 MG/1
500 TABLET, EXTENDED RELEASE ORAL 2 TIMES DAILY
Qty: 180 TABLET | Refills: 3 | Status: SHIPPED | OUTPATIENT
Start: 2025-07-29 | End: 2026-07-29

## 2025-07-29 RX ORDER — LANOLIN ALCOHOL/MO/W.PET/CERES
400 CREAM (GRAM) TOPICAL DAILY
Qty: 90 TABLET | Refills: 3 | Status: SHIPPED | OUTPATIENT
Start: 2025-07-29 | End: 2026-07-29

## 2025-07-29 NOTE — PROGRESS NOTES
Subjective:    Patient ID:  Nelson Wallace is a 65 y.o. 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  In May he developed mild hematuria and subsequently diagnosed with nephrolithiasis.  This has required intervention with ureteral stent placement and subsequent removal of the of the stent after stones past he is doing better no new recent symptoms of hematuria noted.  He has started on exercise program about 2 to 3 times a week.  Some shortness of breath with moderate-to-severe exercises noted.  Denies having any episodes of angina no arm neck or jaw pain noted.  The past effort capacity is fair  Occasional swelling in the lower extremity after long distance travel.  He is off Nexium and uses pantoprazole on p.r.n. basis and dyspeptic symptoms are well controlled      Review of patient's allergies indicates:  No Known Allergies    Past Medical History:   Diagnosis Date    Allergy     Atherosclerosis of native coronary artery of native heart without angina pectoris     Elevated blood pressure     Elevated PSA     GERD (gastroesophageal reflux disease)     Hyperlipemia     Hypertension     Hypothyroidism     Prostate cancer      Past Surgical History:   Procedure Laterality Date    COLONOSCOPY N/A 11/17/2023    Procedure: COLONOSCOPY;  Surgeon: Chirag Martinez MD;  Location: [facility];  Service: Endoscopy;  Laterality: N/A;    CORONARY ANGIOPLASTY WITH STENT PLACEMENT      CYSTO, RETROGRADE PYELOGRAM,BALLOON DILATION, STENT PLACEMENT Left 5/15/2025    Procedure: CYSTO, RETROGRADE PYELOGRAM,BALLOON DILATION, STENT PLACEMENT;  Surgeon: Justin Mckeon MD;  Location: [facility] OR;  Service: Urology;  Laterality: Left;    CYSTOSCOPY N/A 11/24/2020    Procedure: CYSTOSCOPY;  Surgeon: Gilmer Daly MD;  Location: [facility] OR;  Service: Urology;  Laterality: N/A;    ESOPHAGOGASTRODUODENOSCOPY N/A 11/17/2023    Procedure: EGD  (ESOPHAGOGASTRODUODENOSCOPY);  Surgeon: Chirag Martinez MD;  Location: [facility] ENDO;  Service: Endoscopy;  Laterality: N/A;    EXTRACORPOREAL SHOCK WAVE LITHOTRIPSY Left 5/15/2025    Procedure: LITHOTRIPSY, ESWL;  Surgeon: Justin Mckeon MD;  Location: [facility] OR;  Service: Urology;  Laterality: Left;    TRANSRECTAL BIOPSY OF PROSTATE WITH ULTRASOUND GUIDANCE N/A 11/24/2020    Procedure: BIOPSY, PROSTATE, RECTAL APPROACH, WITH US GUIDANCE;  Surgeon: Gilmer Daly MD;  Location: [facility] OR;  Service: Urology;  Laterality: N/A;     Social History[1]     Review of Systems   As noted in HPI in addition     Constitutional: Negative for chills, fatigue and fever.   Eyes: No double vision, No blurred vision  Neuro: No headaches, No dizziness  Respiratory: Negative for cough, shortness of breath and wheezing.    Cardiovascular: Negative for chest pain. Negative for palpitations and leg swelling.   Gastrointestinal: Negative for abdominal pain, No melena, diarrhea, nausea and vomiting.   Genitourinary: Negative for dysuria and frequency, Negative for hematuria  Skin: Negative for bruising, Negative for edema or discoloration noted.   Endocrine: Negative for polyphagia, Negative for heat intolerance, Negative for cold intolerance  Psychiatric: Negative for depression, Negative for anxiety, Negative for memory loss  Musculoskeletal: Negative for neck pain, Negative for muscle weakness, Negative for back pain          Objective        Vitals:    07/29/25 1318   BP: 130/62   Pulse: (!) 55       LIPIDS - LAST 2   Lab Results   Component Value Date    CHOL 163 05/23/2025    CHOL 120 04/19/2024    HDL 38 (L) 05/23/2025    HDL 42 04/19/2024    LDLCALC 87.6 05/23/2025    LDLCALC 55.4 (L) 04/19/2024    TRIG 187 (H) 05/23/2025    TRIG 113 04/19/2024    CHOLHDL 23.3 05/23/2025    CHOLHDL 35.0 04/19/2024       CBC - LAST 2  Lab Results   Component Value Date    WBC 8.61 05/23/2025    WBC 8.73 05/16/2025    RBC 4.52 (L) 05/23/2025  "   RBC 3.93 (L) 05/16/2025    HGB 13.2 (L) 05/23/2025    HGB 11.7 (L) 05/16/2025    HCT 40.1 05/23/2025    HCT 34.1 (L) 05/16/2025    MCV 89 05/23/2025    MCV 87 05/16/2025    MCH 29.2 05/23/2025    MCH 29.8 05/16/2025    MCHC 32.9 05/23/2025    MCHC 34.3 05/16/2025    RDW 12.1 05/23/2025    RDW 12.5 05/16/2025     05/23/2025     (L) 05/16/2025    MPV 11.1 05/23/2025    MPV 11.4 05/16/2025    GRAN 3.9 11/14/2023    GRAN 51.6 11/14/2023    LYMPH 3.0 11/14/2023    LYMPH 40.3 11/14/2023    MONO 0.3 11/14/2023    MONO 3.5 (L) 11/14/2023    BASO 0.04 11/14/2023    BASO 0.06 06/01/2021    NRBC 0 05/23/2025    NRBC 0 05/16/2025       CHEMISTRY & LIVER FUNCTION - LAST 2  Lab Results   Component Value Date     05/23/2025     05/16/2025    K 4.3 05/23/2025    K 3.9 05/16/2025     05/23/2025     (H) 05/16/2025    CO2 27 05/23/2025    CO2 24 05/16/2025    ANIONGAP 5 (L) 05/23/2025    ANIONGAP 5 (L) 05/16/2025    BUN 13 05/23/2025    BUN 15 05/16/2025    CREATININE 0.9 05/23/2025    CREATININE 1.1 05/16/2025     (H) 05/23/2025     (H) 05/16/2025    CALCIUM 9.3 05/23/2025    CALCIUM 8.2 (L) 05/16/2025    MG 2.2 05/23/2025    MG 1.9 05/16/2025    ALBUMIN 4.3 05/23/2025    ALBUMIN 3.3 (L) 05/16/2025    PROT 7.1 05/23/2025    PROT 5.6 (L) 05/16/2025    ALKPHOS 54 (L) 05/23/2025    ALKPHOS 45 (L) 05/16/2025    ALT 26 05/23/2025    ALT 12 05/16/2025    AST 18 05/23/2025    AST 21 05/16/2025    BILITOT 1.0 05/23/2025    BILITOT 0.7 05/16/2025        CARDIAC PROFILE - LAST 2  No results found for: "BNP", "CPK", "CPKMB", "LDH", "TROPONINI", "TROPONINIHS"     COAGULATION - LAST 2  No results found for: "LABPT", "INR", "APTT"    ENDOCRINE & PSA - LAST 2  Lab Results   Component Value Date    HGBA1C 5.9 05/23/2025    HGBA1C 5.9 04/19/2024    TSH 1.877 05/23/2025    TSH 1.791 04/19/2024    PSA <0.01 05/23/2025    PSA 3.3 (H) 04/06/2017        ECHOCARDIOGRAM RESULTS  Results for orders " placed in visit on 09/29/23    Stress Echo Which stress agent will be used? Treadmill Exercise; Color Flow Doppler? No    Interpretation Summary    Left Ventricle: The left ventricle is normal in size. Normal wall thickness. Normal wall motion. There is normal systolic function with a visually estimated ejection fraction of 60 - 65%.    Stress Protocol: The patient exercised for 9 minutes 0 seconds on a Negro protocol, corresponding to a functional capacity of 10.3 METS, achieving a peak heart rate of 144 bpm, which is 91 % of the age predicted maximum heart rate. The patient reported no symptoms during the stress test. The test was stopped because the patient experienced fatigue.    Baseline ECG: The Baseline ECG reveals sinus bradycardia. The axis is normal. The ST segments are normal.    Stress ECG: There is 1.0 mm of upward-sloping ST segment depression in the inferolateral leads (II, III, aVF, V5 and V6) noted during stress.    ECG Conclusion: The ECG portion of the study is positive for ischemia.    Post-stress Echo: The left ventricle systolic function is normal with an EF of 68 %.    Post-stress      CURRENT/PREVIOUS VISIT EKG  Results for orders placed or performed during the hospital encounter of 05/14/25   EKG 12-lead    Collection Time: 05/14/25 12:38 PM   Result Value Ref Range    QRS Duration 92 ms    [facility] QTC Calculation 402 ms    Narrative    Test Reason : R10.9,    Vent. Rate :  50 BPM     Atrial Rate :  50 BPM     P-R Int : 166 ms          QRS Dur :  92 ms      QT Int : 442 ms       P-R-T Axes :  50  56  65 degrees    QTcB Int : 402 ms    Sinus bradycardia  Otherwise normal ECG  When compared with ECG of 27-Sep-2024 08:44,  Premature ventricular complexes are no longer Present  Confirmed by Johny Daigle ([id number]) on 7/19/2025 7:38:31 AM    Referred By: [email or web address] SELF           Confirmed By: Johny Daigle     No valid procedures specified.   Results for orders placed during the hospital encounter  of 02/11/22    Nuclear Stress - Cardiology Interpreted    Interpretation Summary    Normal myocardial perfusion scan. There is no evidence of myocardial ischemia or infarction.    The gated perfusion images showed an ejection fraction of 70% post stress. Normal ejection fraction is greater than 53%.    There is normal wall motion post stress.    LV cavity size is  and normal at stress.    The EKG portion of this study is positive for ischemia.    The patient reported chest pain during the stress test.    No valid procedures specified.          PREVIOUS STRESS TEST              PREVIOUS ANGIOGRAM        PHYSICAL EXAM    GENERAL: well built, well nourished, well-developed in no apparent distress alert and oriented.   HEENT: Normocephalic. Pupils normal and conjunctivae normal.  Mucous membranes normal, no cyanosis or icterus, trachea central,no pallor or icterus is noted..   NECK: No JVD.  Soft carotid bruit with the base of the right neck   THYROID: Thyroid not enlarged. No nodules present..   CARDIAC: Regular rate and rhythm. S1 is normal.S2 is normal.No gallops, grade 2 systolic murmur noted in the apex  CHEST ANATOMY: normal.   LUNGS: Clear to auscultation. No wheezing or rhonchi..   ABDOMEN: Soft no masses or organomegaly.  No abdomen pulsations or bruits.  Normal bowel sounds. No pulsations and no masses felt, No guarding or rebound.   EXTREMITIES: No cyanosis, clubbing or edema noted at this time., no calf tenderness bilaterally.   PERIPHERAL VASCULAR SYSTEM: Good palpable distal pulses.   CENTRAL NERVOUS SYSTEM: No focal motor or sensory deficits noted.   SKIN: Skin without lesions, moist, well perfused.   MUSCLE STRENGTH & TONE: No noteable weakness, atrophy or abnormal movement.     I HAVE REVIEWED :    The vital signs, nurses notes, and all the pertinent radiology and labs.  07/29/2025 EKG shows sinus bradycardia rate of 53 otherwise no acute changes noted.      Current Outpatient Medications   Medication  Instructions    aspirin (ECOTRIN) 81 mg, Oral, Daily    levothyroxine (SYNTHROID) 88 MCG tablet TAKE 1 TABLET BY MOUTH ONCE A DAY BEFORE BREAKFAST    losartan (COZAAR) 50 mg, Oral, Daily    magnesium oxide (MAG-OX) 400 mg, Oral, Daily    pantoprazole (PROTONIX) 40 mg, Daily PRN    ranolazine (RANEXA) 500 mg, Oral, 2 times daily    rosuvastatin (CRESTOR) 20 mg, Oral, Nightly          Assessment & Plan     1. Coronary artery disease involving native coronary artery of native heart without angina pectoris  Assessment & Plan:  Coronary artery disease with some shortness of breath noted at this time suspect small-vessel recommend to obtain a symptom limited exercise stress test with nuclear imaging study to evaluate for any evidence of significant ischemia.    2. Add Ranexa 500 mg b.i.d..  Of note he had tried this has in the past apparently did not have much relief will try this has again at this time.  In the meantime continue on antiplatelet therapy  Aspirin for the time being and continue on Crestor 20 mg nightly the goal is to get his LDL cholesterol closer to 50    Orders:  -     Echo; Future  -     Nuclear Stress - Cardiology Interpreted; Future  -     US Carotid Bilateral; Future; Expected date: 07/29/2025  -     Lipid Panel; Future; Expected date: 08/29/2025    2. Mixed hyperlipidemia  Assessment & Plan:  Continue on Crestor 20 mg nightly may consider addition of Zetia if LDL cholesterol does not improve the goal is to get him closer to 50.  Continue diet and exercise mainly      3. Primary hypertension  Assessment & Plan:  Blood pressure has been reasonably controlled at 130/62 mm Hg continue on losartan at 50 mg once a day maintain low-salt low-fat diet and if the stress test is negative I will encouraged continued exercise program      4. PVC (premature ventricular contraction)  Assessment & Plan:  Clinically has been stable continue on magnesium oxide      5. Aortic ejection murmur  Assessment &  Plan:  Maintain on present regimen obtain echocardiogram for chamber sizes and valvular morphology.      6. Bruit of right carotid artery  Assessment & Plan:  Obtain a carotid duplex study in the meantime continue on antiplatelet therapy and statin therapy    Orders:  -     Echo; Future  -     Nuclear Stress - Cardiology Interpreted; Future  -     US Carotid Bilateral; Future; Expected date: 07/29/2025  -     Lipid Panel; Future; Expected date: 08/29/2025    Other orders  -     ranolazine (RANEXA) 500 MG Tb12; Take 1 tablet (500 mg total) by mouth 2 (two) times daily.  Dispense: 180 tablet; Refill: 3  -     magnesium oxide (MAG-OX) 400 mg (241.3 mg magnesium) tablet; Take 1 tablet (400 mg total) by mouth once daily.  Dispense: 90 tablet; Refill: 3           No follow-ups on file.              [1]   Social History  Tobacco Use    Smoking status: Never    Smokeless tobacco: Never   Substance Use Topics    Alcohol use: Yes     Comment: social    Drug use: No

## 2025-07-29 NOTE — ASSESSMENT & PLAN NOTE
Coronary artery disease with some shortness of breath noted at this time suspect small-vessel recommend to obtain a symptom limited exercise stress test with nuclear imaging study to evaluate for any evidence of significant ischemia.    2. Add Ranexa 500 mg b.i.d..  Of note he had tried this has in the past apparently did not have much relief will try this has again at this time.  In the meantime continue on antiplatelet therapy  Aspirin for the time being and continue on Crestor 20 mg nightly the goal is to get his LDL cholesterol closer to 50

## 2025-07-29 NOTE — ASSESSMENT & PLAN NOTE
Continue on Crestor 20 mg nightly may consider addition of Zetia if LDL cholesterol does not improve the goal is to get him closer to 50.  Continue diet and exercise mainly

## 2025-07-29 NOTE — ASSESSMENT & PLAN NOTE
Blood pressure has been reasonably controlled at 130/62 mm Hg continue on losartan at 50 mg once a day maintain low-salt low-fat diet and if the stress test is negative I will encouraged continued exercise program

## 2025-08-14 ENCOUNTER — TELEPHONE (OUTPATIENT)
Dept: CARDIOLOGY | Facility: HOSPITAL | Age: 65
End: 2025-08-14

## 2025-08-15 ENCOUNTER — HOSPITAL ENCOUNTER (OUTPATIENT)
Dept: RADIOLOGY | Facility: HOSPITAL | Age: 65
Discharge: HOME | End: 2025-08-15
Attending: INTERNAL MEDICINE
Payer: COMMERCIAL

## 2025-08-15 ENCOUNTER — HOSPITAL ENCOUNTER (OUTPATIENT)
Dept: CARDIOLOGY | Facility: HOSPITAL | Age: 65
Discharge: HOME | End: 2025-08-15
Attending: INTERNAL MEDICINE
Payer: COMMERCIAL

## 2025-08-15 DIAGNOSIS — I25.10 CORONARY ARTERY DISEASE INVOLVING NATIVE CORONARY ARTERY OF NATIVE HEART WITHOUT ANGINA PECTORIS: ICD-10-CM

## 2025-08-15 DIAGNOSIS — R09.89 BRUIT OF RIGHT CAROTID ARTERY: ICD-10-CM

## 2025-08-15 DIAGNOSIS — Z13.6 ENCOUNTER FOR SCREENING FOR CARDIOVASCULAR DISORDERS: Primary | ICD-10-CM

## 2025-08-15 LAB
AORTIC ROOT ANNULUS: 3.3 CM
AORTIC VALVE CUSP SEPERATION: 2.3 CM
APICAL FOUR CHAMBER EJECTION FRACTION: 68 %
AV INDEX (PROSTH): 0.85
AV MEAN GRADIENT: 4 MMHG
AV PEAK GRADIENT: 9 MMHG
AV VALVE AREA BY VELOCITY RATIO: 2.5 CM²
AV VALVE AREA: 2.7 CM²
AV VELOCITY RATIO: 0.8
CV ECHO LV RWT: 0.43 CM
CV STRESS BASE HR: 62 BPM
DIASTOLIC BLOOD PRESSURE: 69 MMHG
DOP CALC AO PEAK VEL: 1.5 M/S
DOP CALC AO VTI: 34.1 CM
DOP CALC LVOT AREA: 3.1 CM2
DOP CALC LVOT DIAMETER: 2 CM
DOP CALC LVOT PEAK VEL: 1.2 M/S
DOP CALCLVOT PEAK VEL VTI: 29.1 CM
E WAVE DECELERATION TIME: 243 MSEC
E/A RATIO: 0.99
E/E' RATIO: 8 M/S
ECHO LV POSTERIOR WALL: 0.9 CM (ref 0.6–1.1)
EJECTION FRACTION- HIGH: 65 %
END DIASTOLIC INDEX-HIGH: 153 ML/M2
END DIASTOLIC INDEX-LOW: 93 ML/M2
END SYSTOLIC INDEX-HIGH: 71 ML/M2
END SYSTOLIC INDEX-LOW: 31 ML/M2
FRACTIONAL SHORTENING: 38.1 % (ref 28–44)
INTERVENTRICULAR SEPTUM: 1.3 CM (ref 0.6–1.1)
IVRT: 100 MSEC
LEFT ATRIUM SIZE: 4 CM
LEFT INTERNAL DIMENSION IN SYSTOLE: 2.6 CM (ref 2.1–4)
LEFT VENTRICLE DIASTOLIC VOLUME: 79 ML
LEFT VENTRICLE END DIASTOLIC VOLUME APICAL 4 CHAMBER: 36 ML
LEFT VENTRICLE SYSTOLIC VOLUME: 23 ML
LEFT VENTRICULAR INTERNAL DIMENSION IN DIASTOLE: 4.2 CM (ref 3.5–6)
LEFT VENTRICULAR MASS: 157.1 G
LV LATERAL E/E' RATIO: 6.8 M/S
LV SEPTAL E/E' RATIO: 8.9 M/S
LVED V (TEICH): 79 ML
LVES V (TEICH): 23.4 ML
LVOT MG: 2 MMHG
LVOT MV: 0.62 CM/S
MV PEAK A VEL: 0.9 M/S
MV PEAK E VEL: 0.89 M/S
MV STENOSIS PRESSURE HALF TIME: 61 MS
MV VALVE AREA P 1/2 METHOD: 3.61 CM2
NUC REST DIASTOLIC VOLUME INDEX: 76
NUC REST EJECTION FRACTION: 66
NUC REST SYSTOLIC VOLUME INDEX: 26
NUC STRESS DIASTOLIC VOLUME INDEX: 72
NUC STRESS EJECTION FRACTION: 72 %
NUC STRESS SYSTOLIC VOLUME INDEX: 20
OHS CV CPX 1 MINUTE RECOVERY HEART RATE: 118 BPM
OHS CV CPX 85 PERCENT MAX PREDICTED HEART RATE MALE: 133
OHS CV CPX ESTIMATED METS: 13
OHS CV CPX MAX PREDICTED HEART RATE: 156
OHS CV CPX PATIENT IS FEMALE: 0
OHS CV CPX PATIENT IS MALE: 1
OHS CV CPX PEAK DIASTOLIC BLOOD PRESSURE: 71 MMHG
OHS CV CPX PEAK HEAR RATE: 139 BPM
OHS CV CPX PEAK RATE PRESSURE PRODUCT: NORMAL
OHS CV CPX PEAK SYSTOLIC BLOOD PRESSURE: 213 MMHG
OHS CV CPX PERCENT MAX PREDICTED HEART RATE ACHIEVED: 89
OHS CV CPX RATE PRESSURE PRODUCT PRESENTING: 9362
OHS CV INITIAL DOSE: 12.6 MCG/KG/MIN
OHS CV PEAK DOSE: 27.1 MCG/KG/MIN
OHS CV RV/LV RATIO: 0.55 CM
PISA TR MAX VEL: 1.9 M/S
PV MV: 0.71 M/S
PV PEAK GRADIENT: 4 MMHG
PV PEAK VELOCITY: 1.04 M/S
RA PRESSURE ESTIMATED: 3 MMHG
RETIRED EF AND QEF - SEE NOTES: 53 %
RIGHT VENTRICLE DIASTOLIC BASEL DIMENSION: 2.3 CM
RIGHT VENTRICULAR END-DIASTOLIC DIMENSION: 2.26 CM
RV TB RVSP: 5 MMHG
STRESS ECHO POST EXERCISE DUR MIN: 10 MINUTES
STRESS ECHO POST EXERCISE DUR SEC: 30 SECONDS
STRESS ST DEPRESSION: 1.5 MM
SYSTOLIC BLOOD PRESSURE: 151 MMHG
TDI LATERAL: 0.13 M/S
TDI SEPTAL: 0.1 M/S
TDI: 0.12 M/S
TR MAX PG: 14 MMHG
TV REST PULMONARY ARTERY PRESSURE: 17 MMHG

## 2025-08-15 PROCEDURE — 93017 CV STRESS TEST TRACING ONLY: CPT

## 2025-08-15 PROCEDURE — 93016 CV STRESS TEST SUPVJ ONLY: CPT | Mod: ,,, | Performed by: INTERNAL MEDICINE

## 2025-08-15 PROCEDURE — 93880 EXTRACRANIAL BILAT STUDY: CPT | Mod: 26,,, | Performed by: RADIOLOGY

## 2025-08-15 PROCEDURE — 93306 TTE W/DOPPLER COMPLETE: CPT

## 2025-08-15 PROCEDURE — 93306 TTE W/DOPPLER COMPLETE: CPT | Mod: 26,,, | Performed by: INTERNAL MEDICINE

## 2025-08-15 PROCEDURE — 78452 HT MUSCLE IMAGE SPECT MULT: CPT | Mod: 26,,, | Performed by: INTERNAL MEDICINE

## 2025-08-15 PROCEDURE — 93880 EXTRACRANIAL BILAT STUDY: CPT | Mod: TC

## 2025-08-15 PROCEDURE — A9502 TC99M TETROFOSMIN: HCPCS | Performed by: INTERNAL MEDICINE

## 2025-08-15 PROCEDURE — 93018 CV STRESS TEST I&R ONLY: CPT | Mod: ,,, | Performed by: INTERNAL MEDICINE

## 2025-08-15 RX ORDER — EZETIMIBE 10 MG/1
10 TABLET ORAL NIGHTLY
Qty: 90 TABLET | Refills: 3 | Status: SHIPPED | OUTPATIENT
Start: 2025-08-15 | End: 2025-11-16

## 2025-08-15 RX ADMIN — TETROFOSMIN 27.1 MILLICURIE: 1.38 INJECTION, POWDER, LYOPHILIZED, FOR SOLUTION INTRAVENOUS at 08:08

## 2025-08-15 RX ADMIN — TETROFOSMIN 12.6 MILLICURIE: 1.38 INJECTION, POWDER, LYOPHILIZED, FOR SOLUTION INTRAVENOUS at 06:08

## 2025-08-17 DIAGNOSIS — E03.9 HYPOTHYROIDISM, UNSPECIFIED TYPE: ICD-10-CM

## 2025-08-18 RX ORDER — ASPIRIN 81 MG/1
81 TABLET ORAL DAILY
Qty: 90 TABLET | Refills: 3 | Status: SHIPPED | OUTPATIENT
Start: 2025-08-18 | End: 2026-08-18

## 2025-08-18 RX ORDER — LEVOTHYROXINE SODIUM 88 UG/1
TABLET ORAL
Qty: 90 TABLET | Refills: 3 | Status: SHIPPED | OUTPATIENT
Start: 2025-08-18

## 2025-08-19 ENCOUNTER — TELEPHONE (OUTPATIENT)
Dept: CARDIOLOGY | Facility: CLINIC | Age: 65
End: 2025-08-19
Payer: COMMERCIAL

## 2025-08-19 DIAGNOSIS — Z01.818 PREOP TESTING: Primary | ICD-10-CM

## 2025-08-21 ENCOUNTER — LAB VISIT (OUTPATIENT)
Dept: LAB | Facility: HOSPITAL | Age: 65
End: 2025-08-21
Attending: FAMILY MEDICINE
Payer: COMMERCIAL

## 2025-08-21 DIAGNOSIS — I25.118 ATHEROSCLEROTIC HEART DISEASE OF NATIVE CORONARY ARTERY WITH OTHER FORMS OF ANGINA PECTORIS: ICD-10-CM

## 2025-08-21 DIAGNOSIS — R09.89 BRUIT OF RIGHT CAROTID ARTERY: ICD-10-CM

## 2025-08-21 DIAGNOSIS — I49.3 PVC (PREMATURE VENTRICULAR CONTRACTION): ICD-10-CM

## 2025-08-21 DIAGNOSIS — I15.1 HYPERTENSION SECONDARY TO OTHER RENAL DISORDERS: ICD-10-CM

## 2025-08-21 DIAGNOSIS — E03.2 HYPOTHYROIDISM DUE TO NON-MEDICATION EXOGENOUS SUBSTANCES: ICD-10-CM

## 2025-08-21 DIAGNOSIS — Z01.818 PREOP TESTING: ICD-10-CM

## 2025-08-21 DIAGNOSIS — I25.10 CORONARY ARTERY DISEASE INVOLVING NATIVE CORONARY ARTERY OF NATIVE HEART WITHOUT ANGINA PECTORIS: ICD-10-CM

## 2025-08-21 DIAGNOSIS — E78.2 MODERATE MIXED HYPERLIPIDEMIA NOT REQUIRING STATIN THERAPY: ICD-10-CM

## 2025-08-21 LAB
BUN SERPL-MCNC: 12 MG/DL (ref 8–23)
CHOLEST SERPL-MCNC: 119 MG/DL (ref 120–199)
CHOLEST/HDLC SERPL: 2.6 {RATIO} (ref 2–5)
CREAT SERPL-MCNC: 1 MG/DL (ref 0.5–1.4)
ERYTHROCYTE [DISTWIDTH] IN BLOOD BY AUTOMATED COUNT: 12.1 % (ref 11.5–14.5)
GFR SERPLBLD CREATININE-BSD FMLA CKD-EPI: >60 ML/MIN/1.73/M2
HCT VFR BLD AUTO: 40.4 % (ref 40–54)
HDLC SERPL-MCNC: 46 MG/DL (ref 40–75)
HDLC SERPL: 38.7 % (ref 20–50)
HGB BLD-MCNC: 14 GM/DL (ref 14–18)
LDLC SERPL CALC-MCNC: 49.6 MG/DL (ref 63–159)
MCH RBC QN AUTO: 29.8 PG (ref 27–31)
MCHC RBC AUTO-ENTMCNC: 34.7 G/DL (ref 32–36)
MCV RBC AUTO: 86 FL (ref 82–98)
NONHDLC SERPL-MCNC: 73 MG/DL
PLATELET # BLD AUTO: 176 K/UL (ref 150–450)
PMV BLD AUTO: 11 FL (ref 9.2–12.9)
RBC # BLD AUTO: 4.7 M/UL (ref 4.6–6.2)
TRIGL SERPL-MCNC: 117 MG/DL (ref 30–150)
WBC # BLD AUTO: 7.53 K/UL (ref 3.9–12.7)

## 2025-08-21 PROCEDURE — 85027 COMPLETE CBC AUTOMATED: CPT

## 2025-08-21 PROCEDURE — 36415 COLL VENOUS BLD VENIPUNCTURE: CPT

## 2025-08-21 PROCEDURE — 82565 ASSAY OF CREATININE: CPT

## 2025-08-21 PROCEDURE — 84520 ASSAY OF UREA NITROGEN: CPT

## 2025-08-21 PROCEDURE — 82465 ASSAY BLD/SERUM CHOLESTEROL: CPT

## 2025-08-22 ENCOUNTER — HOSPITAL ENCOUNTER (OUTPATIENT)
Dept: RADIOLOGY | Facility: HOSPITAL | Age: 65
Discharge: HOME | End: 2025-08-22
Attending: INTERNAL MEDICINE
Payer: COMMERCIAL

## 2025-08-22 DIAGNOSIS — Z13.6 ENCOUNTER FOR SCREENING FOR CARDIOVASCULAR DISORDERS: ICD-10-CM

## 2025-08-22 PROCEDURE — 70498 CT ANGIOGRAPHY NECK: CPT | Mod: TC

## 2025-08-22 PROCEDURE — 25500020 PHARM REV CODE 255: Performed by: INTERNAL MEDICINE

## 2025-08-22 PROCEDURE — 70498 CT ANGIOGRAPHY NECK: CPT | Mod: 26,,, | Performed by: RADIOLOGY

## 2025-08-22 PROCEDURE — 70496 CT ANGIOGRAPHY HEAD: CPT | Mod: 26,,, | Performed by: RADIOLOGY

## 2025-08-22 RX ADMIN — IOHEXOL 100 ML: 350 INJECTION, SOLUTION INTRAVENOUS at 10:08

## (undated) DEVICE — GUN BIOPSY 18GA MONOPLY

## (undated) DEVICE — WATER STERILE INJ 500ML BAG

## (undated) DEVICE — SPONGE GAUZE 16PLY 4X4

## (undated) DEVICE — DRAPE MINOR PROCEDURE

## (undated) DEVICE — LUBRICANT SURGILUBE 2 OZ

## (undated) DEVICE — SET CYSTO IRRIGATION UNIV SPIK

## (undated) DEVICE — COVER TRANSDUCER LATEX N/STERI

## (undated) DEVICE — GLOVE SURG ULTRA TOUCH 7

## (undated) DEVICE — NDL HYPODERMIC BLUNT 18G 1.5IN

## (undated) DEVICE — CATH URET OPEN END 4.8X8F 70CM

## (undated) DEVICE — URINAL MALE WITH LID DISP

## (undated) DEVICE — SYR 10CC LUER LOCK

## (undated) DEVICE — SCRUB 10% POVIDONE IODINE 4OZ

## (undated) DEVICE — Device

## (undated) DEVICE — SHEET DRAPE MEDIUM

## (undated) DEVICE — NDL SPINAL 22GX7 SPINOCAN

## (undated) DEVICE — GUIDE BIOPSY BIPLANAR 18G

## (undated) DEVICE — GLOVE SURG ULTRA TOUCH 6

## (undated) DEVICE — KIT CATH URTRL CONE TIP 5F